# Patient Record
Sex: FEMALE | Race: WHITE | NOT HISPANIC OR LATINO | Employment: UNEMPLOYED | URBAN - NONMETROPOLITAN AREA
[De-identification: names, ages, dates, MRNs, and addresses within clinical notes are randomized per-mention and may not be internally consistent; named-entity substitution may affect disease eponyms.]

---

## 2017-05-21 ENCOUNTER — HOSPITAL ENCOUNTER (INPATIENT)
Facility: HOSPITAL | Age: 57
LOS: 4 days | Discharge: HOME HEALTH | End: 2017-05-27
Attending: STUDENT IN AN ORGANIZED HEALTH CARE EDUCATION/TRAINING PROGRAM | Admitting: FAMILY MEDICINE

## 2017-05-21 DIAGNOSIS — I87.2 VENOUS STASIS DERMATITIS OF BOTH LOWER EXTREMITIES: Chronic | ICD-10-CM

## 2017-05-21 DIAGNOSIS — N19 RENAL FAILURE: Primary | ICD-10-CM

## 2017-05-21 DIAGNOSIS — L03.116 CELLULITIS OF LEFT LOWER EXTREMITY: ICD-10-CM

## 2017-05-21 PROBLEM — E11.9 TYPE 2 DIABETES MELLITUS TREATED WITHOUT INSULIN (HCC): Chronic | Status: ACTIVE | Noted: 2017-05-21

## 2017-05-21 PROBLEM — E78.5 DYSLIPIDEMIA: Chronic | Status: ACTIVE | Noted: 2017-05-21

## 2017-05-21 PROBLEM — N32.81 OVERACTIVE BLADDER: Chronic | Status: ACTIVE | Noted: 2017-05-21

## 2017-05-21 PROBLEM — E03.9 ACQUIRED HYPOTHYROIDISM: Chronic | Status: ACTIVE | Noted: 2017-05-21

## 2017-05-21 PROBLEM — I10 ESSENTIAL HYPERTENSION: Chronic | Status: ACTIVE | Noted: 2017-05-21

## 2017-05-21 PROBLEM — K21.00 GERD WITH ESOPHAGITIS: Chronic | Status: ACTIVE | Noted: 2017-05-21

## 2017-05-21 PROBLEM — N17.9 ACUTE RENAL FAILURE (HCC): Status: ACTIVE | Noted: 2017-05-21

## 2017-05-21 LAB
ANION GAP SERPL CALCULATED.3IONS-SCNC: 18.9 MMOL/L
BASOPHILS # BLD AUTO: 0.05 10*3/MM3 (ref 0–0.2)
BASOPHILS NFR BLD AUTO: 0.3 % (ref 0–2.5)
BUN BLD-MCNC: 25 MG/DL (ref 7–20)
BUN/CREAT SERPL: 12.5 (ref 7.1–23.5)
CALCIUM SPEC-SCNC: 9.5 MG/DL (ref 8.4–10.2)
CHLORIDE SERPL-SCNC: 102 MMOL/L (ref 98–107)
CO2 SERPL-SCNC: 20 MMOL/L (ref 26–30)
CREAT BLD-MCNC: 2 MG/DL (ref 0.6–1.3)
DEPRECATED RDW RBC AUTO: 44.3 FL (ref 37–54)
EOSINOPHIL # BLD AUTO: 0.16 10*3/MM3 (ref 0–0.7)
EOSINOPHIL NFR BLD AUTO: 1 % (ref 0–7)
ERYTHROCYTE [DISTWIDTH] IN BLOOD BY AUTOMATED COUNT: 13.4 % (ref 11.5–14.5)
GFR SERPL CREATININE-BSD FRML MDRD: 26 ML/MIN/1.73
GLUCOSE BLD-MCNC: 162 MG/DL (ref 74–98)
GLUCOSE BLDC GLUCOMTR-MCNC: 213 MG/DL (ref 70–130)
HCT VFR BLD AUTO: 39.1 % (ref 37–47)
HGB BLD-MCNC: 12.7 G/DL (ref 12–16)
IMM GRANULOCYTES # BLD: 0.15 10*3/MM3 (ref 0–0.06)
IMM GRANULOCYTES NFR BLD: 1 % (ref 0–0.6)
LYMPHOCYTES # BLD AUTO: 1.71 10*3/MM3 (ref 0.6–3.4)
LYMPHOCYTES NFR BLD AUTO: 11.2 % (ref 10–50)
MCH RBC QN AUTO: 28.9 PG (ref 27–31)
MCHC RBC AUTO-ENTMCNC: 32.5 G/DL (ref 30–37)
MCV RBC AUTO: 88.9 FL (ref 81–99)
MONOCYTES # BLD AUTO: 1.1 10*3/MM3 (ref 0–0.9)
MONOCYTES NFR BLD AUTO: 7.2 % (ref 0–12)
NEUTROPHILS # BLD AUTO: 12.1 10*3/MM3 (ref 2–6.9)
NEUTROPHILS NFR BLD AUTO: 79.3 % (ref 37–80)
NRBC BLD MANUAL-RTO: 0 /100 WBC (ref 0–0)
PLATELET # BLD AUTO: 322 10*3/MM3 (ref 130–400)
PMV BLD AUTO: 9.4 FL (ref 6–12)
POTASSIUM BLD-SCNC: 3.9 MMOL/L (ref 3.5–5.1)
RBC # BLD AUTO: 4.4 10*6/MM3 (ref 4.2–5.4)
SODIUM BLD-SCNC: 137 MMOL/L (ref 137–145)
WBC NRBC COR # BLD: 15.27 10*3/MM3 (ref 4.8–10.8)

## 2017-05-21 PROCEDURE — G0378 HOSPITAL OBSERVATION PER HR: HCPCS

## 2017-05-21 PROCEDURE — 99219 PR INITIAL OBSERVATION CARE/DAY 50 MINUTES: CPT | Performed by: FAMILY MEDICINE

## 2017-05-21 PROCEDURE — 25010000002 HEPARIN (PORCINE) PER 1000 UNITS: Performed by: FAMILY MEDICINE

## 2017-05-21 PROCEDURE — 82962 GLUCOSE BLOOD TEST: CPT

## 2017-05-21 PROCEDURE — 63710000001 INSULIN ASPART PER 5 UNITS: Performed by: FAMILY MEDICINE

## 2017-05-21 PROCEDURE — 80048 BASIC METABOLIC PNL TOTAL CA: CPT | Performed by: PHYSICIAN ASSISTANT

## 2017-05-21 PROCEDURE — 85025 COMPLETE CBC W/AUTO DIFF WBC: CPT | Performed by: PHYSICIAN ASSISTANT

## 2017-05-21 PROCEDURE — 99283 EMERGENCY DEPT VISIT LOW MDM: CPT

## 2017-05-21 RX ORDER — SODIUM CHLORIDE 0.9 % (FLUSH) 0.9 %
1-10 SYRINGE (ML) INJECTION AS NEEDED
Status: DISCONTINUED | OUTPATIENT
Start: 2017-05-21 | End: 2017-05-27 | Stop reason: HOSPADM

## 2017-05-21 RX ORDER — PRAVASTATIN SODIUM 20 MG
40 TABLET ORAL NIGHTLY
Status: DISCONTINUED | OUTPATIENT
Start: 2017-05-21 | End: 2017-05-27 | Stop reason: HOSPADM

## 2017-05-21 RX ORDER — SODIUM CHLORIDE 9 MG/ML
75 INJECTION, SOLUTION INTRAVENOUS CONTINUOUS
Status: DISCONTINUED | OUTPATIENT
Start: 2017-05-21 | End: 2017-05-22

## 2017-05-21 RX ORDER — CARVEDILOL 6.25 MG/1
6.25 TABLET ORAL 2 TIMES DAILY WITH MEALS
Status: DISCONTINUED | OUTPATIENT
Start: 2017-05-22 | End: 2017-05-25 | Stop reason: DRUGHIGH

## 2017-05-21 RX ORDER — DEXTROSE MONOHYDRATE 25 G/50ML
25 INJECTION, SOLUTION INTRAVENOUS
Status: DISCONTINUED | OUTPATIENT
Start: 2017-05-21 | End: 2017-05-27 | Stop reason: HOSPADM

## 2017-05-21 RX ORDER — ONDANSETRON 2 MG/ML
4 INJECTION INTRAMUSCULAR; INTRAVENOUS EVERY 6 HOURS PRN
Status: DISCONTINUED | OUTPATIENT
Start: 2017-05-21 | End: 2017-05-27 | Stop reason: HOSPADM

## 2017-05-21 RX ORDER — LEVOTHYROXINE SODIUM 0.15 MG/1
150 TABLET ORAL
Status: DISCONTINUED | OUTPATIENT
Start: 2017-05-22 | End: 2017-05-27 | Stop reason: HOSPADM

## 2017-05-21 RX ORDER — FAMOTIDINE 20 MG/1
20 TABLET, FILM COATED ORAL 2 TIMES DAILY
Status: DISCONTINUED | OUTPATIENT
Start: 2017-05-21 | End: 2017-05-26

## 2017-05-21 RX ORDER — ARIPIPRAZOLE 5 MG/1
20 TABLET ORAL DAILY
Status: DISCONTINUED | OUTPATIENT
Start: 2017-05-22 | End: 2017-05-27 | Stop reason: HOSPADM

## 2017-05-21 RX ORDER — NICOTINE POLACRILEX 4 MG
20 LOZENGE BUCCAL
Status: DISCONTINUED | OUTPATIENT
Start: 2017-05-21 | End: 2017-05-27 | Stop reason: HOSPADM

## 2017-05-21 RX ORDER — HEPARIN SODIUM 5000 [USP'U]/ML
5000 INJECTION, SOLUTION INTRAVENOUS; SUBCUTANEOUS EVERY 12 HOURS SCHEDULED
Status: DISCONTINUED | OUTPATIENT
Start: 2017-05-21 | End: 2017-05-27 | Stop reason: HOSPADM

## 2017-05-21 RX ORDER — SPIRONOLACTONE 25 MG/1
25 TABLET ORAL 2 TIMES DAILY
Status: DISCONTINUED | OUTPATIENT
Start: 2017-05-21 | End: 2017-05-26

## 2017-05-21 RX ORDER — ASPIRIN 81 MG/1
81 TABLET ORAL DAILY
Status: DISCONTINUED | OUTPATIENT
Start: 2017-05-21 | End: 2017-05-22

## 2017-05-21 RX ORDER — HYDROXYZINE HYDROCHLORIDE 25 MG/1
25 TABLET, FILM COATED ORAL 3 TIMES DAILY PRN
Status: DISCONTINUED | OUTPATIENT
Start: 2017-05-21 | End: 2017-05-27 | Stop reason: HOSPADM

## 2017-05-21 RX ADMIN — SPIRONOLACTONE 25 MG: 25 TABLET, FILM COATED ORAL at 21:55

## 2017-05-21 RX ADMIN — SODIUM CHLORIDE 600 MG: 9 INJECTION, SOLUTION INTRAVENOUS at 17:34

## 2017-05-21 RX ADMIN — HEPARIN SODIUM 5000 UNITS: 5000 INJECTION, SOLUTION INTRAVENOUS; SUBCUTANEOUS at 22:00

## 2017-05-21 RX ADMIN — PRAVASTATIN SODIUM 40 MG: 20 TABLET ORAL at 21:55

## 2017-05-21 RX ADMIN — INSULIN ASPART 4 UNITS: 100 INJECTION, SOLUTION INTRAVENOUS; SUBCUTANEOUS at 22:00

## 2017-05-21 RX ADMIN — SODIUM CHLORIDE 1000 ML: 9 INJECTION, SOLUTION INTRAVENOUS at 18:16

## 2017-05-21 RX ADMIN — SODIUM CHLORIDE 75 ML/HR: 9 INJECTION, SOLUTION INTRAVENOUS at 22:00

## 2017-05-21 RX ADMIN — FAMOTIDINE 20 MG: 20 TABLET, FILM COATED ORAL at 21:55

## 2017-05-22 ENCOUNTER — APPOINTMENT (OUTPATIENT)
Dept: ULTRASOUND IMAGING | Facility: HOSPITAL | Age: 57
End: 2017-05-22

## 2017-05-22 LAB
ALBUMIN SERPL-MCNC: 3.6 G/DL (ref 3.5–5)
ANION GAP SERPL CALCULATED.3IONS-SCNC: 15.5 MMOL/L
BASOPHILS # BLD AUTO: 0.04 10*3/MM3 (ref 0–0.2)
BASOPHILS NFR BLD AUTO: 0.4 % (ref 0–2.5)
BUN BLD-MCNC: 22 MG/DL (ref 7–20)
BUN/CREAT SERPL: 15.7 (ref 7.1–23.5)
CALCIUM SPEC-SCNC: 8.9 MG/DL (ref 8.4–10.2)
CHLORIDE SERPL-SCNC: 108 MMOL/L (ref 98–107)
CO2 SERPL-SCNC: 21 MMOL/L (ref 26–30)
CREAT BLD-MCNC: 1.4 MG/DL (ref 0.6–1.3)
DEPRECATED RDW RBC AUTO: 44.8 FL (ref 37–54)
EOSINOPHIL # BLD AUTO: 0.4 10*3/MM3 (ref 0–0.7)
EOSINOPHIL NFR BLD AUTO: 3.7 % (ref 0–7)
ERYTHROCYTE [DISTWIDTH] IN BLOOD BY AUTOMATED COUNT: 13.7 % (ref 11.5–14.5)
GFR SERPL CREATININE-BSD FRML MDRD: 39 ML/MIN/1.73
GLUCOSE BLD-MCNC: 122 MG/DL (ref 74–98)
GLUCOSE BLDC GLUCOMTR-MCNC: 127 MG/DL (ref 70–130)
GLUCOSE BLDC GLUCOMTR-MCNC: 188 MG/DL (ref 70–130)
GLUCOSE BLDC GLUCOMTR-MCNC: 194 MG/DL (ref 70–130)
HCT VFR BLD AUTO: 34.3 % (ref 37–47)
HGB BLD-MCNC: 11.1 G/DL (ref 12–16)
IMM GRANULOCYTES # BLD: 0.1 10*3/MM3 (ref 0–0.06)
IMM GRANULOCYTES NFR BLD: 0.9 % (ref 0–0.6)
LYMPHOCYTES # BLD AUTO: 2.16 10*3/MM3 (ref 0.6–3.4)
LYMPHOCYTES NFR BLD AUTO: 19.9 % (ref 10–50)
MCH RBC QN AUTO: 29.1 PG (ref 27–31)
MCHC RBC AUTO-ENTMCNC: 32.4 G/DL (ref 30–37)
MCV RBC AUTO: 89.8 FL (ref 81–99)
MONOCYTES # BLD AUTO: 0.84 10*3/MM3 (ref 0–0.9)
MONOCYTES NFR BLD AUTO: 7.7 % (ref 0–12)
NEUTROPHILS # BLD AUTO: 7.33 10*3/MM3 (ref 2–6.9)
NEUTROPHILS NFR BLD AUTO: 67.4 % (ref 37–80)
NRBC BLD MANUAL-RTO: 0 /100 WBC (ref 0–0)
PHOSPHATE SERPL-MCNC: 4.7 MG/DL (ref 2.5–4.5)
PLATELET # BLD AUTO: 290 10*3/MM3 (ref 130–400)
PMV BLD AUTO: 9.6 FL (ref 6–12)
POTASSIUM BLD-SCNC: 3.5 MMOL/L (ref 3.5–5.1)
RBC # BLD AUTO: 3.82 10*6/MM3 (ref 4.2–5.4)
SODIUM BLD-SCNC: 141 MMOL/L (ref 137–145)
WBC NRBC COR # BLD: 10.87 10*3/MM3 (ref 4.8–10.8)

## 2017-05-22 PROCEDURE — 82962 GLUCOSE BLOOD TEST: CPT

## 2017-05-22 PROCEDURE — 93923 UPR/LXTR ART STDY 3+ LVLS: CPT

## 2017-05-22 PROCEDURE — 85025 COMPLETE CBC W/AUTO DIFF WBC: CPT | Performed by: FAMILY MEDICINE

## 2017-05-22 PROCEDURE — 25010000003 CEFAZOLIN PER 500 MG: Performed by: INTERNAL MEDICINE

## 2017-05-22 PROCEDURE — 25010000002 VANCOMYCIN PER 500 MG: Performed by: INTERNAL MEDICINE

## 2017-05-22 PROCEDURE — 99214 OFFICE O/P EST MOD 30 MIN: CPT | Performed by: INTERNAL MEDICINE

## 2017-05-22 PROCEDURE — 99225 PR SBSQ OBSERVATION CARE/DAY 25 MINUTES: CPT | Performed by: NURSE PRACTITIONER

## 2017-05-22 PROCEDURE — 25010000002 HEPARIN (PORCINE) PER 1000 UNITS: Performed by: FAMILY MEDICINE

## 2017-05-22 PROCEDURE — G0378 HOSPITAL OBSERVATION PER HR: HCPCS

## 2017-05-22 PROCEDURE — 80069 RENAL FUNCTION PANEL: CPT | Performed by: FAMILY MEDICINE

## 2017-05-22 PROCEDURE — 63710000001 INSULIN ASPART PER 5 UNITS: Performed by: FAMILY MEDICINE

## 2017-05-22 RX ORDER — SOLIFENACIN SUCCINATE 10 MG/1
10 TABLET, FILM COATED ORAL DAILY
COMMUNITY
End: 2018-09-04 | Stop reason: ALTCHOICE

## 2017-05-22 RX ORDER — LITHIUM CARBONATE 450 MG
450 TABLET, EXTENDED RELEASE ORAL NIGHTLY
COMMUNITY

## 2017-05-22 RX ORDER — FUROSEMIDE 20 MG/1
40 TABLET ORAL DAILY
COMMUNITY
End: 2017-07-03 | Stop reason: SDUPTHER

## 2017-05-22 RX ORDER — BUPROPION HYDROCHLORIDE 150 MG/1
150 TABLET, EXTENDED RELEASE ORAL DAILY
COMMUNITY

## 2017-05-22 RX ORDER — SIMVASTATIN 40 MG
20 TABLET ORAL NIGHTLY
COMMUNITY

## 2017-05-22 RX ADMIN — SODIUM CHLORIDE 300 MG: 9 INJECTION, SOLUTION INTRAVENOUS at 01:30

## 2017-05-22 RX ADMIN — ARIPIPRAZOLE 20 MG: 5 TABLET ORAL at 08:35

## 2017-05-22 RX ADMIN — FAMOTIDINE 20 MG: 20 TABLET, FILM COATED ORAL at 08:36

## 2017-05-22 RX ADMIN — FAMOTIDINE 20 MG: 20 TABLET, FILM COATED ORAL at 20:44

## 2017-05-22 RX ADMIN — HEPARIN SODIUM 5000 UNITS: 5000 INJECTION, SOLUTION INTRAVENOUS; SUBCUTANEOUS at 20:44

## 2017-05-22 RX ADMIN — SPIRONOLACTONE 25 MG: 25 TABLET, FILM COATED ORAL at 17:59

## 2017-05-22 RX ADMIN — VANCOMYCIN HYDROCHLORIDE 1500 MG: 500 INJECTION, POWDER, LYOPHILIZED, FOR SOLUTION INTRAVENOUS at 17:59

## 2017-05-22 RX ADMIN — SPIRONOLACTONE 25 MG: 25 TABLET, FILM COATED ORAL at 08:36

## 2017-05-22 RX ADMIN — INSULIN ASPART 2 UNITS: 100 INJECTION, SOLUTION INTRAVENOUS; SUBCUTANEOUS at 12:07

## 2017-05-22 RX ADMIN — CARVEDILOL 6.25 MG: 6.25 TABLET, FILM COATED ORAL at 17:59

## 2017-05-22 RX ADMIN — SODIUM CHLORIDE 300 MG: 9 INJECTION, SOLUTION INTRAVENOUS at 08:36

## 2017-05-22 RX ADMIN — CARVEDILOL 6.25 MG: 6.25 TABLET, FILM COATED ORAL at 08:36

## 2017-05-22 RX ADMIN — SODIUM CHLORIDE 75 ML/HR: 9 INJECTION, SOLUTION INTRAVENOUS at 15:56

## 2017-05-22 RX ADMIN — LEVOTHYROXINE SODIUM 150 MCG: 150 TABLET ORAL at 06:00

## 2017-05-22 RX ADMIN — INSULIN ASPART 2 UNITS: 100 INJECTION, SOLUTION INTRAVENOUS; SUBCUTANEOUS at 16:31

## 2017-05-22 RX ADMIN — INSULIN ASPART 2 UNITS: 100 INJECTION, SOLUTION INTRAVENOUS; SUBCUTANEOUS at 20:43

## 2017-05-22 RX ADMIN — PRAVASTATIN SODIUM 40 MG: 20 TABLET ORAL at 20:44

## 2017-05-22 RX ADMIN — HEPARIN SODIUM 5000 UNITS: 5000 INJECTION, SOLUTION INTRAVENOUS; SUBCUTANEOUS at 08:36

## 2017-05-22 RX ADMIN — CEFAZOLIN: 1 INJECTION, POWDER, FOR SOLUTION INTRAVENOUS at 16:31

## 2017-05-23 LAB
ALBUMIN SERPL-MCNC: 3.6 G/DL (ref 3.5–5)
ANION GAP SERPL CALCULATED.3IONS-SCNC: 15.6 MMOL/L
BASOPHILS # BLD AUTO: 0.08 10*3/MM3 (ref 0–0.2)
BASOPHILS NFR BLD AUTO: 0.8 % (ref 0–2.5)
BUN BLD-MCNC: 17 MG/DL (ref 7–20)
BUN/CREAT SERPL: 15.5 (ref 7.1–23.5)
CALCIUM SPEC-SCNC: 8.9 MG/DL (ref 8.4–10.2)
CHLORIDE SERPL-SCNC: 111 MMOL/L (ref 98–107)
CO2 SERPL-SCNC: 18 MMOL/L (ref 26–30)
CREAT BLD-MCNC: 1.1 MG/DL (ref 0.6–1.3)
DEPRECATED RDW RBC AUTO: 47.7 FL (ref 37–54)
EOSINOPHIL # BLD AUTO: 0.34 10*3/MM3 (ref 0–0.7)
EOSINOPHIL NFR BLD AUTO: 3.4 % (ref 0–7)
ERYTHROCYTE [DISTWIDTH] IN BLOOD BY AUTOMATED COUNT: 13.8 % (ref 11.5–14.5)
GFR SERPL CREATININE-BSD FRML MDRD: 51 ML/MIN/1.73
GLUCOSE BLD-MCNC: 141 MG/DL (ref 74–98)
GLUCOSE BLDC GLUCOMTR-MCNC: 134 MG/DL (ref 70–130)
GLUCOSE BLDC GLUCOMTR-MCNC: 197 MG/DL (ref 70–130)
GLUCOSE BLDC GLUCOMTR-MCNC: 213 MG/DL (ref 70–130)
GRAM STN SPEC: NORMAL
HCT VFR BLD AUTO: 36.3 % (ref 37–47)
HGB BLD-MCNC: 11.3 G/DL (ref 12–16)
IMM GRANULOCYTES # BLD: 0.13 10*3/MM3 (ref 0–0.06)
IMM GRANULOCYTES NFR BLD: 1.3 % (ref 0–0.6)
LYMPHOCYTES # BLD AUTO: 2.56 10*3/MM3 (ref 0.6–3.4)
LYMPHOCYTES NFR BLD AUTO: 25.4 % (ref 10–50)
MCH RBC QN AUTO: 28.8 PG (ref 27–31)
MCHC RBC AUTO-ENTMCNC: 31.1 G/DL (ref 30–37)
MCV RBC AUTO: 92.4 FL (ref 81–99)
MONOCYTES # BLD AUTO: 0.76 10*3/MM3 (ref 0–0.9)
MONOCYTES NFR BLD AUTO: 7.5 % (ref 0–12)
NEUTROPHILS # BLD AUTO: 6.22 10*3/MM3 (ref 2–6.9)
NEUTROPHILS NFR BLD AUTO: 61.6 % (ref 37–80)
NRBC BLD MANUAL-RTO: 0 /100 WBC (ref 0–0)
PHOSPHATE SERPL-MCNC: 4.8 MG/DL (ref 2.5–4.5)
PLATELET # BLD AUTO: 291 10*3/MM3 (ref 130–400)
PMV BLD AUTO: 9.2 FL (ref 6–12)
POTASSIUM BLD-SCNC: 3.6 MMOL/L (ref 3.5–5.1)
RBC # BLD AUTO: 3.93 10*6/MM3 (ref 4.2–5.4)
SODIUM BLD-SCNC: 141 MMOL/L (ref 137–145)
WBC NRBC COR # BLD: 10.09 10*3/MM3 (ref 4.8–10.8)

## 2017-05-23 PROCEDURE — 99232 SBSQ HOSP IP/OBS MODERATE 35: CPT | Performed by: NURSE PRACTITIONER

## 2017-05-23 PROCEDURE — 80069 RENAL FUNCTION PANEL: CPT | Performed by: FAMILY MEDICINE

## 2017-05-23 PROCEDURE — 87186 SC STD MICRODIL/AGAR DIL: CPT | Performed by: NURSE PRACTITIONER

## 2017-05-23 PROCEDURE — 87070 CULTURE OTHR SPECIMN AEROBIC: CPT | Performed by: NURSE PRACTITIONER

## 2017-05-23 PROCEDURE — 82962 GLUCOSE BLOOD TEST: CPT

## 2017-05-23 PROCEDURE — 87077 CULTURE AEROBIC IDENTIFY: CPT | Performed by: NURSE PRACTITIONER

## 2017-05-23 PROCEDURE — 87075 CULTR BACTERIA EXCEPT BLOOD: CPT | Performed by: NURSE PRACTITIONER

## 2017-05-23 PROCEDURE — 63710000001 INSULIN ASPART PER 5 UNITS: Performed by: FAMILY MEDICINE

## 2017-05-23 PROCEDURE — 85025 COMPLETE CBC W/AUTO DIFF WBC: CPT | Performed by: FAMILY MEDICINE

## 2017-05-23 PROCEDURE — 25010000002 VANCOMYCIN PER 500 MG: Performed by: INTERNAL MEDICINE

## 2017-05-23 PROCEDURE — 87205 SMEAR GRAM STAIN: CPT | Performed by: NURSE PRACTITIONER

## 2017-05-23 PROCEDURE — 87147 CULTURE TYPE IMMUNOLOGIC: CPT | Performed by: NURSE PRACTITIONER

## 2017-05-23 PROCEDURE — 25010000003 CEFAZOLIN PER 500 MG: Performed by: INTERNAL MEDICINE

## 2017-05-23 PROCEDURE — 25010000002 HEPARIN (PORCINE) PER 1000 UNITS: Performed by: FAMILY MEDICINE

## 2017-05-23 RX ORDER — DILTIAZEM HYDROCHLORIDE 240 MG/1
240 CAPSULE, COATED, EXTENDED RELEASE ORAL 2 TIMES DAILY
COMMUNITY

## 2017-05-23 RX ORDER — LITHIUM CARBONATE 450 MG
450 TABLET, EXTENDED RELEASE ORAL NIGHTLY
Status: DISCONTINUED | OUTPATIENT
Start: 2017-05-23 | End: 2017-05-27 | Stop reason: HOSPADM

## 2017-05-23 RX ORDER — OXYBUTYNIN CHLORIDE 5 MG/1
10 TABLET, EXTENDED RELEASE ORAL DAILY
Status: DISCONTINUED | OUTPATIENT
Start: 2017-05-23 | End: 2017-05-27 | Stop reason: HOSPADM

## 2017-05-23 RX ORDER — FUROSEMIDE 20 MG/1
20 TABLET ORAL 2 TIMES DAILY
Status: DISCONTINUED | OUTPATIENT
Start: 2017-05-23 | End: 2017-05-26

## 2017-05-23 RX ORDER — ATORVASTATIN CALCIUM 20 MG/1
20 TABLET, FILM COATED ORAL DAILY
Status: DISCONTINUED | OUTPATIENT
Start: 2017-05-23 | End: 2017-05-23

## 2017-05-23 RX ORDER — BUPROPION HYDROCHLORIDE 150 MG/1
150 TABLET, EXTENDED RELEASE ORAL DAILY
Status: DISCONTINUED | OUTPATIENT
Start: 2017-05-23 | End: 2017-05-27 | Stop reason: HOSPADM

## 2017-05-23 RX ADMIN — FUROSEMIDE 20 MG: 20 TABLET ORAL at 14:31

## 2017-05-23 RX ADMIN — BUPROPION HYDROCHLORIDE 150 MG: 150 TABLET, FILM COATED, EXTENDED RELEASE ORAL at 14:57

## 2017-05-23 RX ADMIN — LEVOTHYROXINE SODIUM 150 MCG: 150 TABLET ORAL at 06:05

## 2017-05-23 RX ADMIN — CEFAZOLIN: 1 INJECTION, POWDER, FOR SOLUTION INTRAVENOUS at 01:46

## 2017-05-23 RX ADMIN — SPIRONOLACTONE 25 MG: 25 TABLET, FILM COATED ORAL at 17:00

## 2017-05-23 RX ADMIN — LITHIUM CARBONATE 450 MG: 450 TABLET, EXTENDED RELEASE ORAL at 21:48

## 2017-05-23 RX ADMIN — VANCOMYCIN HYDROCHLORIDE 2000 MG: 500 INJECTION, POWDER, LYOPHILIZED, FOR SOLUTION INTRAVENOUS at 21:30

## 2017-05-23 RX ADMIN — INSULIN ASPART 2 UNITS: 100 INJECTION, SOLUTION INTRAVENOUS; SUBCUTANEOUS at 21:46

## 2017-05-23 RX ADMIN — FAMOTIDINE 20 MG: 20 TABLET, FILM COATED ORAL at 21:48

## 2017-05-23 RX ADMIN — CARVEDILOL 6.25 MG: 6.25 TABLET, FILM COATED ORAL at 17:00

## 2017-05-23 RX ADMIN — VANCOMYCIN HYDROCHLORIDE 2000 MG: 500 INJECTION, POWDER, LYOPHILIZED, FOR SOLUTION INTRAVENOUS at 09:54

## 2017-05-23 RX ADMIN — SPIRONOLACTONE 25 MG: 25 TABLET, FILM COATED ORAL at 08:01

## 2017-05-23 RX ADMIN — CEFAZOLIN: 1 INJECTION, POWDER, FOR SOLUTION INTRAVENOUS at 16:21

## 2017-05-23 RX ADMIN — PRAVASTATIN SODIUM 40 MG: 20 TABLET ORAL at 21:48

## 2017-05-23 RX ADMIN — INSULIN ASPART 4 UNITS: 100 INJECTION, SOLUTION INTRAVENOUS; SUBCUTANEOUS at 11:53

## 2017-05-23 RX ADMIN — FUROSEMIDE 20 MG: 20 TABLET ORAL at 08:01

## 2017-05-23 RX ADMIN — HEPARIN SODIUM 5000 UNITS: 5000 INJECTION, SOLUTION INTRAVENOUS; SUBCUTANEOUS at 21:48

## 2017-05-23 RX ADMIN — OXYBUTYNIN CHLORIDE 10 MG: 5 TABLET, EXTENDED RELEASE ORAL at 16:59

## 2017-05-23 RX ADMIN — FAMOTIDINE 20 MG: 20 TABLET, FILM COATED ORAL at 08:01

## 2017-05-23 RX ADMIN — CARVEDILOL 6.25 MG: 6.25 TABLET, FILM COATED ORAL at 08:01

## 2017-05-23 RX ADMIN — ARIPIPRAZOLE 20 MG: 5 TABLET ORAL at 08:01

## 2017-05-23 RX ADMIN — HEPARIN SODIUM 5000 UNITS: 5000 INJECTION, SOLUTION INTRAVENOUS; SUBCUTANEOUS at 08:01

## 2017-05-23 RX ADMIN — CEFAZOLIN: 1 INJECTION, POWDER, FOR SOLUTION INTRAVENOUS at 08:01

## 2017-05-24 LAB
ALBUMIN SERPL-MCNC: 3.4 G/DL (ref 3.5–5)
ANION GAP SERPL CALCULATED.3IONS-SCNC: 14.3 MMOL/L
BASOPHILS # BLD AUTO: 0.06 10*3/MM3 (ref 0–0.2)
BASOPHILS NFR BLD AUTO: 0.7 % (ref 0–2.5)
BUN BLD-MCNC: 13 MG/DL (ref 7–20)
BUN/CREAT SERPL: 11.8 (ref 7.1–23.5)
CALCIUM SPEC-SCNC: 8.9 MG/DL (ref 8.4–10.2)
CHLORIDE SERPL-SCNC: 111 MMOL/L (ref 98–107)
CO2 SERPL-SCNC: 22 MMOL/L (ref 26–30)
CREAT BLD-MCNC: 1.1 MG/DL (ref 0.6–1.3)
DEPRECATED RDW RBC AUTO: 45.2 FL (ref 37–54)
EOSINOPHIL # BLD AUTO: 0.4 10*3/MM3 (ref 0–0.7)
EOSINOPHIL NFR BLD AUTO: 4.4 % (ref 0–7)
ERYTHROCYTE [DISTWIDTH] IN BLOOD BY AUTOMATED COUNT: 13.8 % (ref 11.5–14.5)
GFR SERPL CREATININE-BSD FRML MDRD: 51 ML/MIN/1.73
GLUCOSE BLD-MCNC: 123 MG/DL (ref 74–98)
GLUCOSE BLDC GLUCOMTR-MCNC: 116 MG/DL (ref 70–130)
GLUCOSE BLDC GLUCOMTR-MCNC: 129 MG/DL (ref 70–130)
GLUCOSE BLDC GLUCOMTR-MCNC: 147 MG/DL (ref 70–130)
HCT VFR BLD AUTO: 33.2 % (ref 37–47)
HGB BLD-MCNC: 10.4 G/DL (ref 12–16)
IMM GRANULOCYTES # BLD: 0.11 10*3/MM3 (ref 0–0.06)
IMM GRANULOCYTES NFR BLD: 1.2 % (ref 0–0.6)
LYMPHOCYTES # BLD AUTO: 2.39 10*3/MM3 (ref 0.6–3.4)
LYMPHOCYTES NFR BLD AUTO: 26.4 % (ref 10–50)
MCH RBC QN AUTO: 28.2 PG (ref 27–31)
MCHC RBC AUTO-ENTMCNC: 31.3 G/DL (ref 30–37)
MCV RBC AUTO: 90 FL (ref 81–99)
MONOCYTES # BLD AUTO: 0.63 10*3/MM3 (ref 0–0.9)
MONOCYTES NFR BLD AUTO: 7 % (ref 0–12)
NEUTROPHILS # BLD AUTO: 5.47 10*3/MM3 (ref 2–6.9)
NEUTROPHILS NFR BLD AUTO: 60.3 % (ref 37–80)
NRBC BLD MANUAL-RTO: 0 /100 WBC (ref 0–0)
PHOSPHATE SERPL-MCNC: 4.7 MG/DL (ref 2.5–4.5)
PLATELET # BLD AUTO: 347 10*3/MM3 (ref 130–400)
PMV BLD AUTO: 9.3 FL (ref 6–12)
POTASSIUM BLD-SCNC: 3.3 MMOL/L (ref 3.5–5.1)
RBC # BLD AUTO: 3.69 10*6/MM3 (ref 4.2–5.4)
SODIUM BLD-SCNC: 144 MMOL/L (ref 137–145)
WBC NRBC COR # BLD: 9.06 10*3/MM3 (ref 4.8–10.8)

## 2017-05-24 PROCEDURE — 80069 RENAL FUNCTION PANEL: CPT | Performed by: FAMILY MEDICINE

## 2017-05-24 PROCEDURE — 82962 GLUCOSE BLOOD TEST: CPT

## 2017-05-24 PROCEDURE — 25010000002 VANCOMYCIN PER 500 MG: Performed by: INTERNAL MEDICINE

## 2017-05-24 PROCEDURE — 25010000003 CEFAZOLIN PER 500 MG: Performed by: INTERNAL MEDICINE

## 2017-05-24 PROCEDURE — 63710000001 INSULIN ASPART PER 5 UNITS: Performed by: FAMILY MEDICINE

## 2017-05-24 PROCEDURE — 99232 SBSQ HOSP IP/OBS MODERATE 35: CPT | Performed by: NURSE PRACTITIONER

## 2017-05-24 PROCEDURE — 94799 UNLISTED PULMONARY SVC/PX: CPT

## 2017-05-24 PROCEDURE — 85025 COMPLETE CBC W/AUTO DIFF WBC: CPT | Performed by: FAMILY MEDICINE

## 2017-05-24 PROCEDURE — 25010000002 HEPARIN (PORCINE) PER 1000 UNITS: Performed by: FAMILY MEDICINE

## 2017-05-24 RX ORDER — POTASSIUM CHLORIDE 750 MG/1
20 CAPSULE, EXTENDED RELEASE ORAL ONCE
Status: COMPLETED | OUTPATIENT
Start: 2017-05-24 | End: 2017-05-24

## 2017-05-24 RX ORDER — POTASSIUM CHLORIDE 750 MG/1
40 CAPSULE, EXTENDED RELEASE ORAL ONCE
Status: COMPLETED | OUTPATIENT
Start: 2017-05-24 | End: 2017-05-24

## 2017-05-24 RX ORDER — ACETAMINOPHEN 325 MG/1
650 TABLET ORAL EVERY 6 HOURS PRN
Status: DISCONTINUED | OUTPATIENT
Start: 2017-05-24 | End: 2017-05-27 | Stop reason: HOSPADM

## 2017-05-24 RX ADMIN — CARVEDILOL 6.25 MG: 6.25 TABLET, FILM COATED ORAL at 08:36

## 2017-05-24 RX ADMIN — VANCOMYCIN HYDROCHLORIDE 2000 MG: 500 INJECTION, POWDER, LYOPHILIZED, FOR SOLUTION INTRAVENOUS at 08:36

## 2017-05-24 RX ADMIN — FAMOTIDINE 20 MG: 20 TABLET, FILM COATED ORAL at 08:36

## 2017-05-24 RX ADMIN — FUROSEMIDE 20 MG: 20 TABLET ORAL at 15:34

## 2017-05-24 RX ADMIN — HEPARIN SODIUM 5000 UNITS: 5000 INJECTION, SOLUTION INTRAVENOUS; SUBCUTANEOUS at 08:36

## 2017-05-24 RX ADMIN — CEFAZOLIN: 1 INJECTION, POWDER, FOR SOLUTION INTRAVENOUS at 15:35

## 2017-05-24 RX ADMIN — FAMOTIDINE 20 MG: 20 TABLET, FILM COATED ORAL at 20:25

## 2017-05-24 RX ADMIN — FUROSEMIDE 20 MG: 20 TABLET ORAL at 08:36

## 2017-05-24 RX ADMIN — CEFAZOLIN: 1 INJECTION, POWDER, FOR SOLUTION INTRAVENOUS at 08:36

## 2017-05-24 RX ADMIN — VANCOMYCIN HYDROCHLORIDE 2000 MG: 500 INJECTION, POWDER, LYOPHILIZED, FOR SOLUTION INTRAVENOUS at 20:30

## 2017-05-24 RX ADMIN — HEPARIN SODIUM 5000 UNITS: 5000 INJECTION, SOLUTION INTRAVENOUS; SUBCUTANEOUS at 20:25

## 2017-05-24 RX ADMIN — PRAVASTATIN SODIUM 40 MG: 20 TABLET ORAL at 20:25

## 2017-05-24 RX ADMIN — OXYBUTYNIN CHLORIDE 10 MG: 5 TABLET, EXTENDED RELEASE ORAL at 08:36

## 2017-05-24 RX ADMIN — LEVOTHYROXINE SODIUM 150 MCG: 150 TABLET ORAL at 06:36

## 2017-05-24 RX ADMIN — CEFAZOLIN: 1 INJECTION, POWDER, FOR SOLUTION INTRAVENOUS at 01:30

## 2017-05-24 RX ADMIN — LITHIUM CARBONATE 450 MG: 450 TABLET, EXTENDED RELEASE ORAL at 20:25

## 2017-05-24 RX ADMIN — INSULIN ASPART 2 UNITS: 100 INJECTION, SOLUTION INTRAVENOUS; SUBCUTANEOUS at 12:26

## 2017-05-24 RX ADMIN — BUPROPION HYDROCHLORIDE 150 MG: 150 TABLET, FILM COATED, EXTENDED RELEASE ORAL at 08:36

## 2017-05-24 RX ADMIN — ARIPIPRAZOLE 20 MG: 5 TABLET ORAL at 08:36

## 2017-05-24 RX ADMIN — CARVEDILOL 6.25 MG: 6.25 TABLET, FILM COATED ORAL at 15:35

## 2017-05-24 RX ADMIN — POTASSIUM CHLORIDE 40 MEQ: 750 CAPSULE, EXTENDED RELEASE ORAL at 15:34

## 2017-05-24 RX ADMIN — SPIRONOLACTONE 25 MG: 25 TABLET, FILM COATED ORAL at 08:36

## 2017-05-24 RX ADMIN — ACETAMINOPHEN 650 MG: 325 TABLET, FILM COATED ORAL at 20:25

## 2017-05-24 RX ADMIN — POTASSIUM CHLORIDE 20 MEQ: 750 CAPSULE, EXTENDED RELEASE ORAL at 12:26

## 2017-05-24 RX ADMIN — SPIRONOLACTONE 25 MG: 25 TABLET, FILM COATED ORAL at 15:34

## 2017-05-25 LAB
ALBUMIN SERPL-MCNC: 3.4 G/DL (ref 3.5–5)
ANION GAP SERPL CALCULATED.3IONS-SCNC: 13.6 MMOL/L
BASOPHILS # BLD AUTO: 0.04 10*3/MM3 (ref 0–0.2)
BASOPHILS NFR BLD AUTO: 0.5 % (ref 0–2.5)
BUN BLD-MCNC: 11 MG/DL (ref 7–20)
BUN/CREAT SERPL: 11 (ref 7.1–23.5)
CALCIUM SPEC-SCNC: 9.2 MG/DL (ref 8.4–10.2)
CHLORIDE SERPL-SCNC: 113 MMOL/L (ref 98–107)
CO2 SERPL-SCNC: 23 MMOL/L (ref 26–30)
CREAT BLD-MCNC: 1 MG/DL (ref 0.6–1.3)
DEPRECATED RDW RBC AUTO: 44.8 FL (ref 37–54)
EOSINOPHIL # BLD AUTO: 0.32 10*3/MM3 (ref 0–0.7)
EOSINOPHIL NFR BLD AUTO: 4.1 % (ref 0–7)
ERYTHROCYTE [DISTWIDTH] IN BLOOD BY AUTOMATED COUNT: 13.7 % (ref 11.5–14.5)
GFR SERPL CREATININE-BSD FRML MDRD: 57 ML/MIN/1.73
GLUCOSE BLD-MCNC: 112 MG/DL (ref 74–98)
GLUCOSE BLDC GLUCOMTR-MCNC: 111 MG/DL (ref 70–130)
GLUCOSE BLDC GLUCOMTR-MCNC: 141 MG/DL (ref 70–130)
GLUCOSE BLDC GLUCOMTR-MCNC: 144 MG/DL (ref 70–130)
GLUCOSE BLDC GLUCOMTR-MCNC: 160 MG/DL (ref 70–130)
GLUCOSE BLDC GLUCOMTR-MCNC: 164 MG/DL (ref 70–130)
GLUCOSE BLDC GLUCOMTR-MCNC: 170 MG/DL (ref 70–130)
HCT VFR BLD AUTO: 33.1 % (ref 37–47)
HGB BLD-MCNC: 10.6 G/DL (ref 12–16)
IMM GRANULOCYTES # BLD: 0.11 10*3/MM3 (ref 0–0.06)
IMM GRANULOCYTES NFR BLD: 1.4 % (ref 0–0.6)
LYMPHOCYTES # BLD AUTO: 2.31 10*3/MM3 (ref 0.6–3.4)
LYMPHOCYTES NFR BLD AUTO: 29.4 % (ref 10–50)
MCH RBC QN AUTO: 28.6 PG (ref 27–31)
MCHC RBC AUTO-ENTMCNC: 32 G/DL (ref 30–37)
MCV RBC AUTO: 89.5 FL (ref 81–99)
MONOCYTES # BLD AUTO: 0.55 10*3/MM3 (ref 0–0.9)
MONOCYTES NFR BLD AUTO: 7 % (ref 0–12)
NEUTROPHILS # BLD AUTO: 4.53 10*3/MM3 (ref 2–6.9)
NEUTROPHILS NFR BLD AUTO: 57.6 % (ref 37–80)
NRBC BLD MANUAL-RTO: 0 /100 WBC (ref 0–0)
PHOSPHATE SERPL-MCNC: 3.9 MG/DL (ref 2.5–4.5)
PLATELET # BLD AUTO: 366 10*3/MM3 (ref 130–400)
PMV BLD AUTO: 9.3 FL (ref 6–12)
POTASSIUM BLD-SCNC: 3.6 MMOL/L (ref 3.5–5.1)
RBC # BLD AUTO: 3.7 10*6/MM3 (ref 4.2–5.4)
SODIUM BLD-SCNC: 146 MMOL/L (ref 137–145)
VANCOMYCIN TROUGH SERPL-MCNC: 22.36 MCG/ML (ref 5–15)
VANCOMYCIN TROUGH SERPL-MCNC: 25.79 MCG/ML (ref 5–15)
WBC NRBC COR # BLD: 7.86 10*3/MM3 (ref 4.8–10.8)

## 2017-05-25 PROCEDURE — 82962 GLUCOSE BLOOD TEST: CPT

## 2017-05-25 PROCEDURE — 99232 SBSQ HOSP IP/OBS MODERATE 35: CPT | Performed by: NURSE PRACTITIONER

## 2017-05-25 PROCEDURE — 80069 RENAL FUNCTION PANEL: CPT | Performed by: FAMILY MEDICINE

## 2017-05-25 PROCEDURE — 80202 ASSAY OF VANCOMYCIN: CPT | Performed by: INTERNAL MEDICINE

## 2017-05-25 PROCEDURE — 25010000002 HEPARIN (PORCINE) PER 1000 UNITS: Performed by: FAMILY MEDICINE

## 2017-05-25 PROCEDURE — 85025 COMPLETE CBC W/AUTO DIFF WBC: CPT | Performed by: FAMILY MEDICINE

## 2017-05-25 PROCEDURE — 63710000001 INSULIN ASPART PER 5 UNITS: Performed by: FAMILY MEDICINE

## 2017-05-25 PROCEDURE — 25010000002 VANCOMYCIN PER 500 MG: Performed by: INTERNAL MEDICINE

## 2017-05-25 PROCEDURE — 25010000003 CEFAZOLIN PER 500 MG: Performed by: INTERNAL MEDICINE

## 2017-05-25 RX ORDER — CARVEDILOL 6.25 MG/1
6.25 TABLET ORAL EVERY 12 HOURS SCHEDULED
Status: DISCONTINUED | OUTPATIENT
Start: 2017-05-25 | End: 2017-05-27 | Stop reason: HOSPADM

## 2017-05-25 RX ADMIN — LITHIUM CARBONATE 450 MG: 450 TABLET, EXTENDED RELEASE ORAL at 20:48

## 2017-05-25 RX ADMIN — CEFAZOLIN: 1 INJECTION, POWDER, FOR SOLUTION INTRAVENOUS at 08:24

## 2017-05-25 RX ADMIN — CARVEDILOL 6.25 MG: 6.25 TABLET, FILM COATED ORAL at 20:48

## 2017-05-25 RX ADMIN — INSULIN ASPART 2 UNITS: 100 INJECTION, SOLUTION INTRAVENOUS; SUBCUTANEOUS at 17:11

## 2017-05-25 RX ADMIN — CARVEDILOL 6.25 MG: 6.25 TABLET, FILM COATED ORAL at 08:26

## 2017-05-25 RX ADMIN — FUROSEMIDE 20 MG: 20 TABLET ORAL at 08:24

## 2017-05-25 RX ADMIN — CEFAZOLIN: 1 INJECTION, POWDER, FOR SOLUTION INTRAVENOUS at 01:00

## 2017-05-25 RX ADMIN — ACETAMINOPHEN 650 MG: 325 TABLET, FILM COATED ORAL at 15:54

## 2017-05-25 RX ADMIN — BUPROPION HYDROCHLORIDE 150 MG: 150 TABLET, FILM COATED, EXTENDED RELEASE ORAL at 08:24

## 2017-05-25 RX ADMIN — CEFAZOLIN: 1 INJECTION, POWDER, FOR SOLUTION INTRAVENOUS at 17:11

## 2017-05-25 RX ADMIN — VANCOMYCIN HYDROCHLORIDE 1500 MG: 500 INJECTION, POWDER, LYOPHILIZED, FOR SOLUTION INTRAVENOUS at 14:44

## 2017-05-25 RX ADMIN — FUROSEMIDE 20 MG: 20 TABLET ORAL at 14:44

## 2017-05-25 RX ADMIN — PRAVASTATIN SODIUM 40 MG: 20 TABLET ORAL at 20:48

## 2017-05-25 RX ADMIN — HEPARIN SODIUM 5000 UNITS: 5000 INJECTION, SOLUTION INTRAVENOUS; SUBCUTANEOUS at 08:24

## 2017-05-25 RX ADMIN — FAMOTIDINE 20 MG: 20 TABLET, FILM COATED ORAL at 20:48

## 2017-05-25 RX ADMIN — ARIPIPRAZOLE 20 MG: 5 TABLET ORAL at 08:24

## 2017-05-25 RX ADMIN — OXYBUTYNIN CHLORIDE 10 MG: 5 TABLET, EXTENDED RELEASE ORAL at 08:24

## 2017-05-25 RX ADMIN — SPIRONOLACTONE 25 MG: 25 TABLET, FILM COATED ORAL at 08:24

## 2017-05-25 RX ADMIN — ACETAMINOPHEN 650 MG: 325 TABLET, FILM COATED ORAL at 20:48

## 2017-05-25 RX ADMIN — FAMOTIDINE 20 MG: 20 TABLET, FILM COATED ORAL at 08:24

## 2017-05-25 RX ADMIN — HEPARIN SODIUM 5000 UNITS: 5000 INJECTION, SOLUTION INTRAVENOUS; SUBCUTANEOUS at 20:48

## 2017-05-25 RX ADMIN — LEVOTHYROXINE SODIUM 150 MCG: 150 TABLET ORAL at 06:38

## 2017-05-26 ENCOUNTER — APPOINTMENT (OUTPATIENT)
Dept: GENERAL RADIOLOGY | Facility: HOSPITAL | Age: 57
End: 2017-05-26

## 2017-05-26 LAB
ALBUMIN SERPL-MCNC: 3.3 G/DL (ref 3.5–5)
ANION GAP SERPL CALCULATED.3IONS-SCNC: 18.7 MMOL/L
BACTERIA SPEC AEROBE CULT: ABNORMAL
BASOPHILS # BLD AUTO: 0.05 10*3/MM3 (ref 0–0.2)
BASOPHILS NFR BLD AUTO: 0.6 % (ref 0–2.5)
BUN BLD-MCNC: 10 MG/DL (ref 7–20)
BUN/CREAT SERPL: 11.1 (ref 7.1–23.5)
CALCIUM SPEC-SCNC: 9.1 MG/DL (ref 8.4–10.2)
CHLORIDE SERPL-SCNC: 112 MMOL/L (ref 98–107)
CO2 SERPL-SCNC: 19 MMOL/L (ref 26–30)
CREAT BLD-MCNC: 0.9 MG/DL (ref 0.6–1.3)
DEPRECATED RDW RBC AUTO: 43.8 FL (ref 37–54)
EOSINOPHIL # BLD AUTO: 0.32 10*3/MM3 (ref 0–0.7)
EOSINOPHIL NFR BLD AUTO: 3.7 % (ref 0–7)
ERYTHROCYTE [DISTWIDTH] IN BLOOD BY AUTOMATED COUNT: 13.7 % (ref 11.5–14.5)
GFR SERPL CREATININE-BSD FRML MDRD: 65 ML/MIN/1.73
GLUCOSE BLD-MCNC: 106 MG/DL (ref 74–98)
GLUCOSE BLDC GLUCOMTR-MCNC: 110 MG/DL (ref 70–130)
GLUCOSE BLDC GLUCOMTR-MCNC: 134 MG/DL (ref 70–130)
GLUCOSE BLDC GLUCOMTR-MCNC: 142 MG/DL (ref 70–130)
GLUCOSE BLDC GLUCOMTR-MCNC: 190 MG/DL (ref 70–130)
HCT VFR BLD AUTO: 36 % (ref 37–47)
HGB BLD-MCNC: 11.6 G/DL (ref 12–16)
IMM GRANULOCYTES # BLD: 0.12 10*3/MM3 (ref 0–0.06)
IMM GRANULOCYTES NFR BLD: 1.4 % (ref 0–0.6)
LYMPHOCYTES # BLD AUTO: 2.39 10*3/MM3 (ref 0.6–3.4)
LYMPHOCYTES NFR BLD AUTO: 27.9 % (ref 10–50)
MCH RBC QN AUTO: 28.4 PG (ref 27–31)
MCHC RBC AUTO-ENTMCNC: 32.2 G/DL (ref 30–37)
MCV RBC AUTO: 88.2 FL (ref 81–99)
MONOCYTES # BLD AUTO: 0.69 10*3/MM3 (ref 0–0.9)
MONOCYTES NFR BLD AUTO: 8 % (ref 0–12)
NEUTROPHILS # BLD AUTO: 5.01 10*3/MM3 (ref 2–6.9)
NEUTROPHILS NFR BLD AUTO: 58.4 % (ref 37–80)
NRBC BLD MANUAL-RTO: 0 /100 WBC (ref 0–0)
PHOSPHATE SERPL-MCNC: 4.3 MG/DL (ref 2.5–4.5)
PLATELET # BLD AUTO: 367 10*3/MM3 (ref 130–400)
PMV BLD AUTO: 9.3 FL (ref 6–12)
POTASSIUM BLD-SCNC: 3.7 MMOL/L (ref 3.5–5.1)
RBC # BLD AUTO: 4.08 10*6/MM3 (ref 4.2–5.4)
SODIUM BLD-SCNC: 146 MMOL/L (ref 137–145)
WBC NRBC COR # BLD: 8.58 10*3/MM3 (ref 4.8–10.8)

## 2017-05-26 PROCEDURE — 75625 CONTRAST EXAM ABDOMINL AORTA: CPT | Performed by: INTERNAL MEDICINE

## 2017-05-26 PROCEDURE — 75710 ARTERY X-RAYS ARM/LEG: CPT | Performed by: INTERNAL MEDICINE

## 2017-05-26 PROCEDURE — 0 IOPAMIDOL PER 1 ML: Performed by: INTERNAL MEDICINE

## 2017-05-26 PROCEDURE — C1894 INTRO/SHEATH, NON-LASER: HCPCS

## 2017-05-26 PROCEDURE — B4101ZZ FLUOROSCOPY OF ABDOMINAL AORTA USING LOW OSMOLAR CONTRAST: ICD-10-PCS | Performed by: INTERNAL MEDICINE

## 2017-05-26 PROCEDURE — 99232 SBSQ HOSP IP/OBS MODERATE 35: CPT | Performed by: NURSE PRACTITIONER

## 2017-05-26 PROCEDURE — 25010000002 VANCOMYCIN PER 500 MG: Performed by: INTERNAL MEDICINE

## 2017-05-26 PROCEDURE — 25010000002 HEPARIN (PORCINE) PER 1000 UNITS: Performed by: FAMILY MEDICINE

## 2017-05-26 PROCEDURE — 82962 GLUCOSE BLOOD TEST: CPT

## 2017-05-26 PROCEDURE — 71010 HC CHEST PA OR AP: CPT

## 2017-05-26 PROCEDURE — C1760 CLOSURE DEV, VASC: HCPCS | Performed by: INTERNAL MEDICINE

## 2017-05-26 PROCEDURE — 02HV33Z INSERTION OF INFUSION DEVICE INTO SUPERIOR VENA CAVA, PERCUTANEOUS APPROACH: ICD-10-PCS | Performed by: FAMILY MEDICINE

## 2017-05-26 PROCEDURE — 25010000003 CEFAZOLIN PER 500 MG: Performed by: INTERNAL MEDICINE

## 2017-05-26 PROCEDURE — C1769 GUIDE WIRE: HCPCS | Performed by: INTERNAL MEDICINE

## 2017-05-26 PROCEDURE — 85025 COMPLETE CBC W/AUTO DIFF WBC: CPT | Performed by: FAMILY MEDICINE

## 2017-05-26 PROCEDURE — C1894 INTRO/SHEATH, NON-LASER: HCPCS | Performed by: INTERNAL MEDICINE

## 2017-05-26 PROCEDURE — 94762 N-INVAS EAR/PLS OXIMTRY CONT: CPT

## 2017-05-26 PROCEDURE — C1751 CATH, INF, PER/CENT/MIDLINE: HCPCS

## 2017-05-26 PROCEDURE — 80069 RENAL FUNCTION PANEL: CPT | Performed by: FAMILY MEDICINE

## 2017-05-26 RX ORDER — ALPRAZOLAM 0.5 MG/1
0.5 TABLET ORAL 3 TIMES DAILY PRN
Status: DISCONTINUED | OUTPATIENT
Start: 2017-05-26 | End: 2017-05-27 | Stop reason: HOSPADM

## 2017-05-26 RX ORDER — SODIUM CHLORIDE 9 MG/ML
100 INJECTION, SOLUTION INTRAVENOUS CONTINUOUS
Status: DISCONTINUED | OUTPATIENT
Start: 2017-05-26 | End: 2017-05-27 | Stop reason: HOSPADM

## 2017-05-26 RX ORDER — SODIUM CHLORIDE 0.9 % (FLUSH) 0.9 %
1-10 SYRINGE (ML) INJECTION AS NEEDED
Status: DISCONTINUED | OUTPATIENT
Start: 2017-05-26 | End: 2017-05-27 | Stop reason: HOSPADM

## 2017-05-26 RX ORDER — LIDOCAINE HYDROCHLORIDE 10 MG/ML
INJECTION, SOLUTION INFILTRATION; PERINEURAL AS NEEDED
Status: DISCONTINUED | OUTPATIENT
Start: 2017-05-26 | End: 2017-05-26 | Stop reason: HOSPADM

## 2017-05-26 RX ORDER — SODIUM CHLORIDE 9 MG/ML
1-3 INJECTION, SOLUTION INTRAVENOUS CONTINUOUS
Status: DISCONTINUED | OUTPATIENT
Start: 2017-05-26 | End: 2017-05-27 | Stop reason: HOSPADM

## 2017-05-26 RX ADMIN — CEFAZOLIN: 1 INJECTION, POWDER, FOR SOLUTION INTRAVENOUS at 08:45

## 2017-05-26 RX ADMIN — FUROSEMIDE 20 MG: 20 TABLET ORAL at 08:33

## 2017-05-26 RX ADMIN — ARIPIPRAZOLE 20 MG: 5 TABLET ORAL at 08:32

## 2017-05-26 RX ADMIN — VANCOMYCIN HYDROCHLORIDE 1500 MG: 500 INJECTION, POWDER, LYOPHILIZED, FOR SOLUTION INTRAVENOUS at 15:28

## 2017-05-26 RX ADMIN — OXYBUTYNIN CHLORIDE 10 MG: 5 TABLET, EXTENDED RELEASE ORAL at 08:32

## 2017-05-26 RX ADMIN — CARVEDILOL 6.25 MG: 6.25 TABLET, FILM COATED ORAL at 08:33

## 2017-05-26 RX ADMIN — CEFAZOLIN: 1 INJECTION, POWDER, FOR SOLUTION INTRAVENOUS at 00:57

## 2017-05-26 RX ADMIN — ACETAMINOPHEN 650 MG: 325 TABLET, FILM COATED ORAL at 20:19

## 2017-05-26 RX ADMIN — BUPROPION HYDROCHLORIDE 150 MG: 150 TABLET, FILM COATED, EXTENDED RELEASE ORAL at 08:33

## 2017-05-26 RX ADMIN — HEPARIN SODIUM 5000 UNITS: 5000 INJECTION, SOLUTION INTRAVENOUS; SUBCUTANEOUS at 08:33

## 2017-05-26 RX ADMIN — FAMOTIDINE 20 MG: 20 TABLET, FILM COATED ORAL at 08:33

## 2017-05-26 RX ADMIN — VANCOMYCIN HYDROCHLORIDE 1500 MG: 500 INJECTION, POWDER, LYOPHILIZED, FOR SOLUTION INTRAVENOUS at 02:30

## 2017-05-26 RX ADMIN — SPIRONOLACTONE 25 MG: 25 TABLET, FILM COATED ORAL at 08:33

## 2017-05-26 RX ADMIN — PRAVASTATIN SODIUM 40 MG: 20 TABLET ORAL at 20:19

## 2017-05-26 RX ADMIN — HEPARIN SODIUM 5000 UNITS: 5000 INJECTION, SOLUTION INTRAVENOUS; SUBCUTANEOUS at 20:18

## 2017-05-26 RX ADMIN — CARVEDILOL 6.25 MG: 6.25 TABLET, FILM COATED ORAL at 20:18

## 2017-05-26 RX ADMIN — LEVOTHYROXINE SODIUM 150 MCG: 150 TABLET ORAL at 07:35

## 2017-05-26 RX ADMIN — SODIUM CHLORIDE 1 ML/KG/HR: 9 INJECTION, SOLUTION INTRAVENOUS at 12:00

## 2017-05-26 RX ADMIN — SPIRONOLACTONE 25 MG: 25 TABLET, FILM COATED ORAL at 00:58

## 2017-05-26 RX ADMIN — LITHIUM CARBONATE 450 MG: 450 TABLET, EXTENDED RELEASE ORAL at 20:18

## 2017-05-27 VITALS
RESPIRATION RATE: 20 BRPM | DIASTOLIC BLOOD PRESSURE: 92 MMHG | WEIGHT: 293 LBS | HEART RATE: 59 BPM | SYSTOLIC BLOOD PRESSURE: 146 MMHG | HEIGHT: 68 IN | TEMPERATURE: 97.4 F | OXYGEN SATURATION: 99 % | BODY MASS INDEX: 44.41 KG/M2

## 2017-05-27 LAB
ALBUMIN SERPL-MCNC: 3.5 G/DL (ref 3.5–5)
ANION GAP SERPL CALCULATED.3IONS-SCNC: 13.6 MMOL/L
BACTERIA SPEC ANAEROBE CULT: NO GROWTH
BASOPHILS # BLD AUTO: 0.03 10*3/MM3 (ref 0–0.2)
BASOPHILS NFR BLD AUTO: 0.3 % (ref 0–2.5)
BUN BLD-MCNC: 8 MG/DL (ref 7–20)
BUN/CREAT SERPL: 7.3 (ref 7.1–23.5)
CALCIUM SPEC-SCNC: 9 MG/DL (ref 8.4–10.2)
CHLORIDE SERPL-SCNC: 112 MMOL/L (ref 98–107)
CO2 SERPL-SCNC: 25 MMOL/L (ref 26–30)
CREAT BLD-MCNC: 1.1 MG/DL (ref 0.6–1.3)
DEPRECATED RDW RBC AUTO: 45.5 FL (ref 37–54)
EOSINOPHIL # BLD AUTO: 0.23 10*3/MM3 (ref 0–0.7)
EOSINOPHIL NFR BLD AUTO: 2.7 % (ref 0–7)
ERYTHROCYTE [DISTWIDTH] IN BLOOD BY AUTOMATED COUNT: 14 % (ref 11.5–14.5)
GFR SERPL CREATININE-BSD FRML MDRD: 51 ML/MIN/1.73
GLUCOSE BLD-MCNC: 109 MG/DL (ref 74–98)
GLUCOSE BLDC GLUCOMTR-MCNC: 113 MG/DL (ref 70–130)
GLUCOSE BLDC GLUCOMTR-MCNC: 149 MG/DL (ref 70–130)
GLUCOSE BLDC GLUCOMTR-MCNC: 158 MG/DL (ref 70–130)
HCT VFR BLD AUTO: 32.7 % (ref 37–47)
HGB BLD-MCNC: 10.3 G/DL (ref 12–16)
IMM GRANULOCYTES # BLD: 0.08 10*3/MM3 (ref 0–0.06)
IMM GRANULOCYTES NFR BLD: 0.9 % (ref 0–0.6)
LYMPHOCYTES # BLD AUTO: 2.12 10*3/MM3 (ref 0.6–3.4)
LYMPHOCYTES NFR BLD AUTO: 24.5 % (ref 10–50)
MCH RBC QN AUTO: 28.4 PG (ref 27–31)
MCHC RBC AUTO-ENTMCNC: 31.5 G/DL (ref 30–37)
MCV RBC AUTO: 90.1 FL (ref 81–99)
MONOCYTES # BLD AUTO: 0.68 10*3/MM3 (ref 0–0.9)
MONOCYTES NFR BLD AUTO: 7.9 % (ref 0–12)
NEUTROPHILS # BLD AUTO: 5.5 10*3/MM3 (ref 2–6.9)
NEUTROPHILS NFR BLD AUTO: 63.7 % (ref 37–80)
NRBC BLD MANUAL-RTO: 0 /100 WBC (ref 0–0)
PHOSPHATE SERPL-MCNC: 4.3 MG/DL (ref 2.5–4.5)
PLATELET # BLD AUTO: 371 10*3/MM3 (ref 130–400)
PMV BLD AUTO: 8.7 FL (ref 6–12)
POTASSIUM BLD-SCNC: 3.6 MMOL/L (ref 3.5–5.1)
RBC # BLD AUTO: 3.63 10*6/MM3 (ref 4.2–5.4)
SODIUM BLD-SCNC: 147 MMOL/L (ref 137–145)
VANCOMYCIN TROUGH SERPL-MCNC: 19.56 MCG/ML (ref 5–15)
WBC NRBC COR # BLD: 8.64 10*3/MM3 (ref 4.8–10.8)

## 2017-05-27 PROCEDURE — 82962 GLUCOSE BLOOD TEST: CPT

## 2017-05-27 PROCEDURE — 80202 ASSAY OF VANCOMYCIN: CPT | Performed by: NURSE PRACTITIONER

## 2017-05-27 PROCEDURE — 25010000002 HEPARIN (PORCINE) PER 1000 UNITS: Performed by: FAMILY MEDICINE

## 2017-05-27 PROCEDURE — 63710000001 INSULIN ASPART PER 5 UNITS: Performed by: FAMILY MEDICINE

## 2017-05-27 PROCEDURE — 99238 HOSP IP/OBS DSCHRG MGMT 30/<: CPT | Performed by: FAMILY MEDICINE

## 2017-05-27 PROCEDURE — 85025 COMPLETE CBC W/AUTO DIFF WBC: CPT | Performed by: FAMILY MEDICINE

## 2017-05-27 PROCEDURE — 80069 RENAL FUNCTION PANEL: CPT | Performed by: FAMILY MEDICINE

## 2017-05-27 PROCEDURE — 25010000002 VANCOMYCIN PER 500 MG: Performed by: INTERNAL MEDICINE

## 2017-05-27 RX ADMIN — INSULIN ASPART 2 UNITS: 100 INJECTION, SOLUTION INTRAVENOUS; SUBCUTANEOUS at 11:12

## 2017-05-27 RX ADMIN — BUPROPION HYDROCHLORIDE 150 MG: 150 TABLET, FILM COATED, EXTENDED RELEASE ORAL at 08:12

## 2017-05-27 RX ADMIN — HEPARIN SODIUM 5000 UNITS: 5000 INJECTION, SOLUTION INTRAVENOUS; SUBCUTANEOUS at 08:12

## 2017-05-27 RX ADMIN — OXYBUTYNIN CHLORIDE 10 MG: 5 TABLET, EXTENDED RELEASE ORAL at 08:13

## 2017-05-27 RX ADMIN — VANCOMYCIN HYDROCHLORIDE 1500 MG: 500 INJECTION, POWDER, LYOPHILIZED, FOR SOLUTION INTRAVENOUS at 15:55

## 2017-05-27 RX ADMIN — LEVOTHYROXINE SODIUM 150 MCG: 150 TABLET ORAL at 06:41

## 2017-05-27 RX ADMIN — VANCOMYCIN HYDROCHLORIDE 1500 MG: 500 INJECTION, POWDER, LYOPHILIZED, FOR SOLUTION INTRAVENOUS at 02:30

## 2017-05-27 RX ADMIN — ARIPIPRAZOLE 20 MG: 5 TABLET ORAL at 08:13

## 2017-05-27 RX ADMIN — CARVEDILOL 6.25 MG: 6.25 TABLET, FILM COATED ORAL at 08:13

## 2017-05-29 LAB — GLUCOSE BLDC GLUCOMTR-MCNC: 105 MG/DL (ref 70–130)

## 2017-06-02 ENCOUNTER — LAB REQUISITION (OUTPATIENT)
Dept: LAB | Facility: HOSPITAL | Age: 57
End: 2017-06-02

## 2017-06-02 DIAGNOSIS — N18.2 CHRONIC KIDNEY DISEASE, STAGE II (MILD): ICD-10-CM

## 2017-06-02 DIAGNOSIS — I12.9 HYPERTENSIVE CHRONIC KIDNEY DISEASE WITH STAGE 1 THROUGH STAGE 4 CHRONIC KIDNEY DISEASE, OR UNSPECIFIED CHRONIC KIDNEY DISEASE: ICD-10-CM

## 2017-06-02 DIAGNOSIS — L03.116 CELLULITIS OF LEFT LOWER EXTREMITY: ICD-10-CM

## 2017-06-02 DIAGNOSIS — E11.22 TYPE 2 DIABETES MELLITUS WITH DIABETIC CHRONIC KIDNEY DISEASE (HCC): ICD-10-CM

## 2017-06-02 LAB
ALBUMIN SERPL-MCNC: 3.9 G/DL (ref 3.5–5)
ALBUMIN/GLOB SERPL: 1.3 G/DL (ref 1–2)
ALP SERPL-CCNC: 98 U/L (ref 38–126)
ALT SERPL W P-5'-P-CCNC: 40 U/L (ref 13–69)
ANION GAP SERPL CALCULATED.3IONS-SCNC: 18.7 MMOL/L
AST SERPL-CCNC: 26 U/L (ref 15–46)
BASOPHILS # BLD AUTO: 0.06 10*3/MM3 (ref 0–0.2)
BASOPHILS NFR BLD AUTO: 0.5 % (ref 0–2.5)
BILIRUB SERPL-MCNC: 0.3 MG/DL (ref 0.2–1.3)
BUN BLD-MCNC: 14 MG/DL (ref 7–20)
BUN/CREAT SERPL: 11.7 (ref 7.1–23.5)
CALCIUM SPEC-SCNC: 9.5 MG/DL (ref 8.4–10.2)
CHLORIDE SERPL-SCNC: 106 MMOL/L (ref 98–107)
CO2 SERPL-SCNC: 25 MMOL/L (ref 26–30)
CREAT BLD-MCNC: 1.2 MG/DL (ref 0.6–1.3)
DEPRECATED RDW RBC AUTO: 46.5 FL (ref 37–54)
EOSINOPHIL # BLD AUTO: 0.21 10*3/MM3 (ref 0–0.7)
EOSINOPHIL NFR BLD AUTO: 1.8 % (ref 0–7)
ERYTHROCYTE [DISTWIDTH] IN BLOOD BY AUTOMATED COUNT: 14.5 % (ref 11.5–14.5)
GFR SERPL CREATININE-BSD FRML MDRD: 46 ML/MIN/1.73
GLOBULIN UR ELPH-MCNC: 3 GM/DL
GLUCOSE BLD-MCNC: 74 MG/DL (ref 74–98)
HCT VFR BLD AUTO: 34.5 % (ref 37–47)
HGB BLD-MCNC: 11 G/DL (ref 12–16)
IMM GRANULOCYTES # BLD: 0.05 10*3/MM3 (ref 0–0.06)
IMM GRANULOCYTES NFR BLD: 0.4 % (ref 0–0.6)
LYMPHOCYTES # BLD AUTO: 2 10*3/MM3 (ref 0.6–3.4)
LYMPHOCYTES NFR BLD AUTO: 17.4 % (ref 10–50)
MCH RBC QN AUTO: 28.7 PG (ref 27–31)
MCHC RBC AUTO-ENTMCNC: 31.9 G/DL (ref 30–37)
MCV RBC AUTO: 90.1 FL (ref 81–99)
MONOCYTES # BLD AUTO: 0.77 10*3/MM3 (ref 0–0.9)
MONOCYTES NFR BLD AUTO: 6.7 % (ref 0–12)
NEUTROPHILS # BLD AUTO: 8.42 10*3/MM3 (ref 2–6.9)
NEUTROPHILS NFR BLD AUTO: 73.2 % (ref 37–80)
NRBC BLD MANUAL-RTO: 0 /100 WBC (ref 0–0)
PLATELET # BLD AUTO: 414 10*3/MM3 (ref 130–400)
PMV BLD AUTO: 9.6 FL (ref 6–12)
POTASSIUM BLD-SCNC: 3.7 MMOL/L (ref 3.5–5.1)
PROT SERPL-MCNC: 6.9 G/DL (ref 6.3–8.2)
RBC # BLD AUTO: 3.83 10*6/MM3 (ref 4.2–5.4)
SODIUM BLD-SCNC: 146 MMOL/L (ref 137–145)
WBC NRBC COR # BLD: 11.51 10*3/MM3 (ref 4.8–10.8)

## 2017-06-02 PROCEDURE — 80053 COMPREHEN METABOLIC PANEL: CPT | Performed by: INTERNAL MEDICINE

## 2017-06-02 PROCEDURE — 85025 COMPLETE CBC W/AUTO DIFF WBC: CPT | Performed by: INTERNAL MEDICINE

## 2017-06-08 ENCOUNTER — HOSPITAL ENCOUNTER (INPATIENT)
Facility: HOSPITAL | Age: 57
LOS: 6 days | Discharge: NURSING, REHAB OR LONG-TERM CARE | End: 2017-06-14
Attending: INTERNAL MEDICINE | Admitting: INTERNAL MEDICINE

## 2017-06-08 ENCOUNTER — APPOINTMENT (OUTPATIENT)
Dept: CT IMAGING | Facility: HOSPITAL | Age: 57
End: 2017-06-08

## 2017-06-08 ENCOUNTER — OFFICE VISIT (OUTPATIENT)
Dept: FAMILY MEDICINE CLINIC | Facility: CLINIC | Age: 57
End: 2017-06-08

## 2017-06-08 VITALS
OXYGEN SATURATION: 93 % | HEIGHT: 68 IN | SYSTOLIC BLOOD PRESSURE: 130 MMHG | HEART RATE: 66 BPM | WEIGHT: 293 LBS | DIASTOLIC BLOOD PRESSURE: 74 MMHG | TEMPERATURE: 98.6 F | BODY MASS INDEX: 44.41 KG/M2

## 2017-06-08 DIAGNOSIS — L03.116 CELLULITIS OF LEFT LOWER EXTREMITY: Primary | ICD-10-CM

## 2017-06-08 DIAGNOSIS — Z74.09 IMPAIRED MOBILITY AND ADLS: Primary | ICD-10-CM

## 2017-06-08 DIAGNOSIS — Z78.9 IMPAIRED MOBILITY AND ADLS: Primary | ICD-10-CM

## 2017-06-08 PROBLEM — L03.90 CELLULITIS: Status: ACTIVE | Noted: 2017-06-08

## 2017-06-08 LAB
ALBUMIN SERPL-MCNC: 4.3 G/DL (ref 3.5–5)
ALBUMIN/GLOB SERPL: 1.3 G/DL (ref 1–2)
ALP SERPL-CCNC: 105 U/L (ref 38–126)
ALT SERPL W P-5'-P-CCNC: 67 U/L (ref 13–69)
ANION GAP SERPL CALCULATED.3IONS-SCNC: 16.9 MMOL/L
AST SERPL-CCNC: 50 U/L (ref 15–46)
BASOPHILS # BLD AUTO: 0.07 10*3/MM3 (ref 0–0.2)
BASOPHILS NFR BLD AUTO: 0.7 % (ref 0–2.5)
BILIRUB SERPL-MCNC: 0.4 MG/DL (ref 0.2–1.3)
BUN BLD-MCNC: 25 MG/DL (ref 7–20)
BUN/CREAT SERPL: 15.6 (ref 7.1–23.5)
CALCIUM SPEC-SCNC: 9.4 MG/DL (ref 8.4–10.2)
CHLORIDE SERPL-SCNC: 103 MMOL/L (ref 98–107)
CO2 SERPL-SCNC: 30 MMOL/L (ref 26–30)
CREAT BLD-MCNC: 1.6 MG/DL (ref 0.6–1.3)
DEPRECATED RDW RBC AUTO: 46.7 FL (ref 37–54)
EOSINOPHIL # BLD AUTO: 0.29 10*3/MM3 (ref 0–0.7)
EOSINOPHIL NFR BLD AUTO: 3.1 % (ref 0–7)
ERYTHROCYTE [DISTWIDTH] IN BLOOD BY AUTOMATED COUNT: 14.2 % (ref 11.5–14.5)
GFR SERPL CREATININE-BSD FRML MDRD: 33 ML/MIN/1.73
GLOBULIN UR ELPH-MCNC: 3.3 GM/DL
GLUCOSE BLD-MCNC: 162 MG/DL (ref 74–98)
GLUCOSE BLDC GLUCOMTR-MCNC: 130 MG/DL (ref 70–130)
GLUCOSE BLDC GLUCOMTR-MCNC: 160 MG/DL (ref 70–130)
HCT VFR BLD AUTO: 38.7 % (ref 37–47)
HGB BLD-MCNC: 12.2 G/DL (ref 12–16)
IMM GRANULOCYTES # BLD: 0.07 10*3/MM3 (ref 0–0.06)
IMM GRANULOCYTES NFR BLD: 0.7 % (ref 0–0.6)
LYMPHOCYTES # BLD AUTO: 1.47 10*3/MM3 (ref 0.6–3.4)
LYMPHOCYTES NFR BLD AUTO: 15.6 % (ref 10–50)
MCH RBC QN AUTO: 28.7 PG (ref 27–31)
MCHC RBC AUTO-ENTMCNC: 31.5 G/DL (ref 30–37)
MCV RBC AUTO: 91.1 FL (ref 81–99)
MONOCYTES # BLD AUTO: 0.66 10*3/MM3 (ref 0–0.9)
MONOCYTES NFR BLD AUTO: 7 % (ref 0–12)
NEUTROPHILS # BLD AUTO: 6.85 10*3/MM3 (ref 2–6.9)
NEUTROPHILS NFR BLD AUTO: 72.9 % (ref 37–80)
NRBC BLD MANUAL-RTO: 0 /100 WBC (ref 0–0)
PLATELET # BLD AUTO: 346 10*3/MM3 (ref 130–400)
PMV BLD AUTO: 9.3 FL (ref 6–12)
POTASSIUM BLD-SCNC: 3.9 MMOL/L (ref 3.5–5.1)
PROT SERPL-MCNC: 7.6 G/DL (ref 6.3–8.2)
RBC # BLD AUTO: 4.25 10*6/MM3 (ref 4.2–5.4)
SODIUM BLD-SCNC: 146 MMOL/L (ref 137–145)
WBC NRBC COR # BLD: 9.41 10*3/MM3 (ref 4.8–10.8)

## 2017-06-08 PROCEDURE — 99222 1ST HOSP IP/OBS MODERATE 55: CPT | Performed by: NURSE PRACTITIONER

## 2017-06-08 PROCEDURE — 94799 UNLISTED PULMONARY SVC/PX: CPT

## 2017-06-08 PROCEDURE — 25010000002 PIPERACILLIN SOD-TAZOBACTAM PER 1 G: Performed by: NURSE PRACTITIONER

## 2017-06-08 PROCEDURE — 25010000002 VANCOMYCIN PER 500 MG: Performed by: NURSE PRACTITIONER

## 2017-06-08 PROCEDURE — 25010000002 ENOXAPARIN PER 10 MG: Performed by: NURSE PRACTITIONER

## 2017-06-08 PROCEDURE — 63710000001 INSULIN ASPART PER 5 UNITS: Performed by: NURSE PRACTITIONER

## 2017-06-08 PROCEDURE — 85025 COMPLETE CBC W/AUTO DIFF WBC: CPT | Performed by: NURSE PRACTITIONER

## 2017-06-08 PROCEDURE — 99213 OFFICE O/P EST LOW 20 MIN: CPT | Performed by: INTERNAL MEDICINE

## 2017-06-08 PROCEDURE — 80053 COMPREHEN METABOLIC PANEL: CPT | Performed by: NURSE PRACTITIONER

## 2017-06-08 PROCEDURE — 94660 CPAP INITIATION&MGMT: CPT

## 2017-06-08 PROCEDURE — 82962 GLUCOSE BLOOD TEST: CPT

## 2017-06-08 PROCEDURE — 73700 CT LOWER EXTREMITY W/O DYE: CPT

## 2017-06-08 RX ORDER — LITHIUM CARBONATE 450 MG
450 TABLET, EXTENDED RELEASE ORAL NIGHTLY
Status: DISCONTINUED | OUTPATIENT
Start: 2017-06-08 | End: 2017-06-14 | Stop reason: HOSPADM

## 2017-06-08 RX ORDER — DEXTROSE MONOHYDRATE 25 G/50ML
25 INJECTION, SOLUTION INTRAVENOUS
Status: DISCONTINUED | OUTPATIENT
Start: 2017-06-08 | End: 2017-06-14 | Stop reason: HOSPADM

## 2017-06-08 RX ORDER — PANTOPRAZOLE SODIUM 40 MG/1
40 TABLET, DELAYED RELEASE ORAL EVERY MORNING
Status: DISCONTINUED | OUTPATIENT
Start: 2017-06-09 | End: 2017-06-08 | Stop reason: SDDI

## 2017-06-08 RX ORDER — OXYBUTYNIN CHLORIDE 5 MG/1
5 TABLET, EXTENDED RELEASE ORAL DAILY
Status: DISCONTINUED | OUTPATIENT
Start: 2017-06-09 | End: 2017-06-14 | Stop reason: HOSPADM

## 2017-06-08 RX ORDER — LEVOTHYROXINE SODIUM 0.15 MG/1
150 TABLET ORAL
Status: DISCONTINUED | OUTPATIENT
Start: 2017-06-09 | End: 2017-06-14 | Stop reason: HOSPADM

## 2017-06-08 RX ORDER — ATORVASTATIN CALCIUM 20 MG/1
20 TABLET, FILM COATED ORAL NIGHTLY
Status: DISCONTINUED | OUTPATIENT
Start: 2017-06-08 | End: 2017-06-14 | Stop reason: HOSPADM

## 2017-06-08 RX ORDER — DILTIAZEM HYDROCHLORIDE 240 MG/1
480 CAPSULE, COATED, EXTENDED RELEASE ORAL DAILY
Status: DISCONTINUED | OUTPATIENT
Start: 2017-06-09 | End: 2017-06-14 | Stop reason: HOSPADM

## 2017-06-08 RX ORDER — SODIUM CHLORIDE 0.9 % (FLUSH) 0.9 %
1-10 SYRINGE (ML) INJECTION AS NEEDED
Status: DISCONTINUED | OUTPATIENT
Start: 2017-06-08 | End: 2017-06-14 | Stop reason: HOSPADM

## 2017-06-08 RX ORDER — ONDANSETRON 2 MG/ML
4 INJECTION INTRAMUSCULAR; INTRAVENOUS EVERY 6 HOURS PRN
Status: DISCONTINUED | OUTPATIENT
Start: 2017-06-08 | End: 2017-06-14 | Stop reason: HOSPADM

## 2017-06-08 RX ORDER — FAMOTIDINE 20 MG/1
20 TABLET, FILM COATED ORAL DAILY
Status: DISCONTINUED | OUTPATIENT
Start: 2017-06-09 | End: 2017-06-14 | Stop reason: HOSPADM

## 2017-06-08 RX ORDER — ONDANSETRON 4 MG/1
4 TABLET, FILM COATED ORAL EVERY 6 HOURS PRN
Status: DISCONTINUED | OUTPATIENT
Start: 2017-06-08 | End: 2017-06-14 | Stop reason: HOSPADM

## 2017-06-08 RX ORDER — CARVEDILOL 25 MG/1
25 TABLET ORAL 2 TIMES DAILY WITH MEALS
Status: DISCONTINUED | OUTPATIENT
Start: 2017-06-08 | End: 2017-06-14 | Stop reason: HOSPADM

## 2017-06-08 RX ORDER — ACETAMINOPHEN 325 MG/1
650 TABLET ORAL EVERY 4 HOURS PRN
Status: DISCONTINUED | OUTPATIENT
Start: 2017-06-08 | End: 2017-06-14 | Stop reason: HOSPADM

## 2017-06-08 RX ORDER — ARIPIPRAZOLE 5 MG/1
30 TABLET ORAL NIGHTLY
Status: DISCONTINUED | OUTPATIENT
Start: 2017-06-08 | End: 2017-06-14 | Stop reason: HOSPADM

## 2017-06-08 RX ORDER — BUPROPION HYDROCHLORIDE 150 MG/1
150 TABLET, EXTENDED RELEASE ORAL DAILY
Status: DISCONTINUED | OUTPATIENT
Start: 2017-06-09 | End: 2017-06-14 | Stop reason: HOSPADM

## 2017-06-08 RX ORDER — NICOTINE POLACRILEX 4 MG
20 LOZENGE BUCCAL
Status: DISCONTINUED | OUTPATIENT
Start: 2017-06-08 | End: 2017-06-14 | Stop reason: HOSPADM

## 2017-06-08 RX ORDER — ASPIRIN 81 MG/1
81 TABLET, CHEWABLE ORAL DAILY
Status: DISCONTINUED | OUTPATIENT
Start: 2017-06-08 | End: 2017-06-08 | Stop reason: SDDI

## 2017-06-08 RX ORDER — HYDROCODONE BITARTRATE AND ACETAMINOPHEN 5; 325 MG/1; MG/1
1 TABLET ORAL EVERY 6 HOURS PRN
Status: DISCONTINUED | OUTPATIENT
Start: 2017-06-08 | End: 2017-06-14 | Stop reason: HOSPADM

## 2017-06-08 RX ORDER — ONDANSETRON 4 MG/1
4 TABLET, ORALLY DISINTEGRATING ORAL EVERY 6 HOURS PRN
Status: DISCONTINUED | OUTPATIENT
Start: 2017-06-08 | End: 2017-06-14 | Stop reason: HOSPADM

## 2017-06-08 RX ORDER — FUROSEMIDE 20 MG/1
20 TABLET ORAL DAILY
Status: DISCONTINUED | OUTPATIENT
Start: 2017-06-09 | End: 2017-06-08

## 2017-06-08 RX ADMIN — ATORVASTATIN CALCIUM 20 MG: 20 TABLET, FILM COATED ORAL at 20:23

## 2017-06-08 RX ADMIN — ENOXAPARIN SODIUM 40 MG: 40 INJECTION SUBCUTANEOUS at 18:12

## 2017-06-08 RX ADMIN — INSULIN ASPART 3 UNITS: 100 INJECTION, SOLUTION INTRAVENOUS; SUBCUTANEOUS at 18:12

## 2017-06-08 RX ADMIN — ACETAMINOPHEN 650 MG: 325 TABLET, FILM COATED ORAL at 20:22

## 2017-06-08 RX ADMIN — LITHIUM CARBONATE 450 MG: 450 TABLET, EXTENDED RELEASE ORAL at 20:23

## 2017-06-08 RX ADMIN — ARIPIPRAZOLE 30 MG: 5 TABLET ORAL at 20:24

## 2017-06-08 RX ADMIN — PIPERACILLIN SODIUM AND TAZOBACTAM SODIUM 3.38 G: 3; .375 INJECTION, POWDER, FOR SOLUTION INTRAVENOUS at 18:15

## 2017-06-08 RX ADMIN — VANCOMYCIN HYDROCHLORIDE 1500 MG: 500 INJECTION, POWDER, LYOPHILIZED, FOR SOLUTION INTRAVENOUS at 21:23

## 2017-06-08 NOTE — H&P
[facility]   HISTORY AND PHYSICAL          Name:  Caty Garcia   Age:  57 y.o.  Sex:  female  :  1960  MRN:  [id number]   Visit Number:  [id number]  Admission Date:  2017  Date Of Service:  17  Primary Care Physician:  Jose Katz MD    Chief Complaint:   Left lower extremity cellulitis    History Of Presenting Illness:    This is a 57-year-old female with past medical history significant for chronic venous insufficiency, peripheral vascular disease, morbid obesity, diabetes mellitus type 2, dyslipidemia, hypothyroidism, and chronic essential hypertension who was last admitted to this facility and discharged on 17 were at that time she was treated for left lower extremity cellulitis secondary to MRSA.  She was discharged home on IV antibiotic in the form of vancomycin.  A PICC line was placed at that time.  Dr. Mehta did see and evaluate the patient during that admission as well to rule out peripheral arterial disease for which she underwent abdominal aortogram with runoff and there was no evidence of significant  peripheral vascular disease.  He felt that this is likely chronic venous insufficiency and had planned to do workup on an outpatient basis.    Patient has been getting IV antibiotic in the form of vancomycin.  She went in today to follow-up with Dr. Villarreal in her office.  Dr. Villarreal was concerned that her left lower extremity was not showing any improvement and felt that she would likely need admission and further workup.      Review Of Systems:     General ROS: Patient denies any fevers, chills or loss of consciousness. Complains of generalized weakness.   Psychological ROS: Denies any hallucinations and delusions.  Ophthalmic ROS: Denies any diplopia or transient loss of vision.  ENT ROS: Denies sore throat, nasal congestion or ear pain.   Allergy and Immunology ROS: Denies rash or itching.  Hematological and Lymphatic ROS: Denies neck swelling  or easy bleeding.  Endocrine ROS: Denies any recent unintentional weight gain or loss.  Breast ROS: Denies any pain or swelling.  Respiratory ROS: Denies cough or shortness of breath.   Cardiovascular ROS: Denies chest pain or palpitations. No history of exertional chest pain.   Gastrointestinal ROS: Denies nausea and vomiting. Denies any abdominal pain. No diarrhea.   Genito-Urinary ROS: Denies dysuria or hematuria.  Musculoskeletal ROS: Complains of chronic back pain. No muscle pain. No calf pain.   Neurological ROS: Denies any focal weakness. No loss of consciousness. Denies any numbness. Denies neck pain.   Dermatological ROS: Denies any redness or pruritis.     Past Medical History:    Past Medical History:   Diagnosis Date   • Arthritis    • Bipolar 1 disorder    • Diabetes mellitus    • Disease of thyroid gland    • High blood pressure    • High cholesterol    • Migraine    • Sleep apnea        Past Surgical history:    Past Surgical History:   Procedure Laterality Date   • CHOLECYSTECTOMY     • COLONOSCOPY     • GALLBLADDER SURGERY     • GASTRIC SLEEVE LAPAROSCOPIC     • INTERVENTIONAL RADIOLOGY PROCEDURE N/A 5/26/2017    Procedure: Abdominal Aortagram with Runoff;  Surgeon: Otf Mehta MD;  Location: [facility] INVASIVE LOCATION;  Service:    • TOTAL HIP ARTHROPLASTY Right    • TOTAL HIP ARTHROPLASTY Left        Social History:  Denies smoking, alcohol or illicit drug use.  She does live alone.        Family History:    Family History   Problem Relation Age of Onset   • Hypertension Father    • Hyperlipidemia Father    • Diabetes Father    • Heart attack Father    • Liver cancer Father        Allergies:      Allegra [fexofenadine]; Lamotrigine; Nortriptyline; Prozac [fluoxetine]; Trazodone; and Trazodone and nefazodone    Home Medications:    Prior to Admission Medications     Prescriptions Last Dose Informant Patient Reported? Taking?    ARIPiprazole (ABILIFY) 20 MG tablet   Yes No    Take 30  mg by mouth Every Night.    aspirin 81 MG tablet   Yes No    Take 81 mg by mouth Daily.    buPROPion SR (WELLBUTRIN SR) 150 MG 12 hr tablet   Yes No    Take 150 mg by mouth Daily.    carvedilol (COREG) 6.25 MG tablet   Yes No    Take 25 mg by mouth 2 (Two) Times a Day With Meals.    diltiaZEM CD (CARDIZEM CD) 240 MG 24 hr capsule   Yes No    Take 480 mg by mouth Daily.    ergocalciferol (ERGOCALCIFEROL) 87304 UNITS capsule   Yes No    Take 50,000 Units by mouth 1 (One) Time Per Week.    furosemide (LASIX) 20 MG tablet   Yes No    Take 20 mg by mouth Daily.    glipiZIDE (GLUCOTROL) 10 MG tablet   Yes No    Take 10 mg by mouth 2 (Two) Times a Day Before Meals.    levothyroxine (SYNTHROID, LEVOTHROID) 150 MCG tablet   Yes No    Take 150 mcg by mouth Daily.    lithium (ESKALITH) 450 MG CR tablet   Yes No    Take 450 mg by mouth Every Night.    Multiple Vitamin (MULTI-DAY PO)   Yes No    Take 1 tablet by mouth Daily.    omeprazole (PriLOSEC) 40 MG capsule   Yes No    Take 40 mg by mouth Daily.    simvastatin (ZOCOR) 40 MG tablet   Yes No    Take 40 mg by mouth Every Night.    solifenacin (VESICARE) 10 MG tablet   Yes No    Take 10 mg by mouth Daily.             ED Medications:    Medications - No data to display    Vital Signs:    Temp:  [98.6 °F (37 °C)] 98.6 °F (37 °C)  Heart Rate:  [66] 66  BP: (130)/(74) 130/74    There were no vitals filed for this visit.    There is no height or weight on file to calculate BMI.    Physical Exam:      General Appearance:  Alert and cooperative, not in any acute distress.   Head:  Atraumatic and normocephalic, without obvious abnormality.   Eyes:          PERRLA, conjunctivae and sclerae normal, no Icterus. No pallor. Extra-occular movements are within normal limits.   Ears:  Ears appear intact with no abnormalities noted.   Throat: No oral lesions, no thrush, oral mucosa moist.   Neck: Supple, trachea midline, no thyromegaly, no carotid bruit.   Back:   No kyphoscoliosis present. No  tenderness to palpation,   range of motion normal.   Lungs:   Chest shape is normal. Breath sounds heard bilaterally equally.  No crackles or wheezing. No Pleural rub or bronchial breathing.   Heart:  Normal S1 and S2, no murmur, no gallop, no rub. No JVD   Abdomen:   Normal bowel sounds, no masses, no organomegaly. Soft     non-tender, non-distended, no guarding, no rebound                tenderness   Extremities: Moves all extremities well, no edema, no cyanosis, no clubbing.   Pulses: Pulses palpable and equal bilaterally   Skin: No bleeding, bruising or rash   Lymph nodes:  Psychiatric/Behavior:    No palpable adenopathy  Mood normal, behavior normal   Neurologic: Cranial nerves 2 - 12 grossly intact, sensation intact, Motor power is normal and equal bilaterally.           Labs:    Lab Results (last 24 hours)     ** No results found for the last 24 hours. **          Radiology:    Imaging Results (last 72 hours)     ** No results found for the last 72 hours. **          Assessment and Plan:  1.  Left lower extremity cellulitis secondary to MRSA-we will get repeat cultures.  A CT scan of the lower extremity will be done to rule out abscess.  Dr. Villarreal with infectious disease will be consulted.  We will continue patient on IV antibiotic in the form of vancomycin and add Zosyn as per infectious disease request.  Will consult surgery for evaluation for possible debreidement.  2.  Chronic venous insufficiency-this is to be worked up on an outpatient basis.  Patient underwent an abdominal aortogram with runoff during last admission for which there was no significant evidence of peripheral vascular disease.    3.  Diabetes mellitus type 2 patient is on subcutaneous insulin protocol will monitor blood sugars and titrate accordingly    4.  Acquired hypothyroidism continue home medication    5.  Chronic essential hypertension-we'll continue home medications.    6.  Venous stasis dermatitis    7.  Dyslipidemia continue  home medications    8.  Bed bug infestation-  patient had her home sprayed yesterday.  She did have a bed bug present on her clothing when she presented to the floor.    9.  OSAS- CPAP.          Lima Connolly, LAURENCE  06/08/17  4:08 PM

## 2017-06-08 NOTE — PROGRESS NOTES
Continued Stay Note  TONY Whitley     Patient Name: Caty Garcia  MRN: [id number]  Today's Date: 6/8/2017    Admit Date: 6/8/2017          Discharge Plan       06/08/17 1919    Case Management/Social Work Plan    Plan home, home health home infusion    Patient/Family In Agreement With Plan yes    Additional Comments Pt lives alone does have a friend that checks on her has [facility]  for skilled nursing,  [facility]  provides IV meds,  Pt states she is able to give her own IV antibitiotics              Discharge Codes     None        Expected Discharge Date and Time     Expected Discharge Date Expected Discharge Time    Jun 12, 2017             Michelle Hernandez RN

## 2017-06-08 NOTE — PLAN OF CARE
Problem: Fall Risk (Adult)  Goal: Identify Related Risk Factors and Signs and Symptoms  Outcome: Outcome(s) achieved Date Met:  06/08/17

## 2017-06-08 NOTE — PROGRESS NOTES
Pharmacokinetic Initial Note - Vancomycin    Pharmacy was consulted to dose vancomycin for  Caty Garcia, a 57 y.o. female    (!) 400 lb 4.8 oz (182 kg)    Indication for use: cellulitis due to MRSA; Completed last dose of vancomycin 1500 mg IV q12h yesterday morning (6/7).       Results from last 7 days     Lab Units 06/02/17  1146   WBC 10*3/mm3 11.51*   CREATININE mg/dL 1.20      Estimated Creatinine Clearance: 89.8 mL/min (by C-G formula based on Cr of 1.2).  Temp Readings from Last 1 Encounters:   06/08/17 97 °F (36.1 °C) (Oral)       Other Antimicrobials  Zosyn 3.375g IV every 6 hours    Assessment/Plan  Initiated Vancomycin 1500 mg IV every 12 hours, as a continuation of outpatient dose. Will order Vancomycin trough for 6/10 to assess current regimen. Pharmacy will monitor renal function and adjust dose accordingly.    Saba Landeros, NelD  06/08/17 4:59 PM

## 2017-06-08 NOTE — PLAN OF CARE
Problem: Infection, Risk/Actual (Adult)  Goal: Identify Related Risk Factors and Signs and Symptoms  Outcome: Outcome(s) achieved Date Met:  06/08/17

## 2017-06-08 NOTE — PROGRESS NOTES
Subjective   Caty Garcia is a 57 y.o. female.     Chief Complaint   Patient presents with   • Cellulitis     LLE       History of Present Illness     Patient is a  56 yo morbidly obese female, with h/o chronic venous insufficiency of b/l LE , PVD, DM II, essential HTN, who was recently admitted from 5/21-5/29 for left lower extremity cellulitis secondary to MRSA. She was discharged on IV Vancomycin (wound Cx grew MRSA).  She returns to the clinic. LLE edema and erythema have not improved and she has developed a blistering wound on the anterior and posterior areas, with discharge,    The following portions of the patient's history were reviewed and updated as appropriate: allergies, current medications, past family history, past medical history, past social history, past surgical history and problem list.    Past Medical History:   Diagnosis Date   • Arthritis    • Bipolar 1 disorder    • Diabetes mellitus    • Disease of thyroid gland    • High blood pressure    • High cholesterol    • Migraine    • Sleep apnea        Past Surgical History:   Procedure Laterality Date   • CHOLECYSTECTOMY     • COLONOSCOPY     • GALLBLADDER SURGERY     • GASTRIC SLEEVE LAPAROSCOPIC     • INTERVENTIONAL RADIOLOGY PROCEDURE N/A 5/26/2017    Procedure: Abdominal Aortagram with Runoff;  Surgeon: Otf Mehta MD;  Location: [facility] INVASIVE LOCATION;  Service:    • TOTAL HIP ARTHROPLASTY Right    • TOTAL HIP ARTHROPLASTY Left        No current facility-administered medications on file prior to visit.      Current Outpatient Prescriptions on File Prior to Visit   Medication Sig Dispense Refill   • ARIPiprazole (ABILIFY) 20 MG tablet Take 30 mg by mouth Every Night.     • aspirin 81 MG tablet Take 81 mg by mouth Daily.     • buPROPion SR (WELLBUTRIN SR) 150 MG 12 hr tablet Take 150 mg by mouth Daily.     • carvedilol (COREG) 6.25 MG tablet Take 25 mg by mouth 2 (Two) Times a Day With Meals.     • diltiaZEM CD  (CARDIZEM CD) 240 MG 24 hr capsule Take 480 mg by mouth Daily.     • ergocalciferol (ERGOCALCIFEROL) 57124 UNITS capsule Take 50,000 Units by mouth 1 (One) Time Per Week.     • furosemide (LASIX) 20 MG tablet Take 20 mg by mouth Daily.     • glipiZIDE (GLUCOTROL) 10 MG tablet Take 10 mg by mouth 2 (Two) Times a Day Before Meals.     • levothyroxine (SYNTHROID, LEVOTHROID) 150 MCG tablet Take 150 mcg by mouth Daily.     • lithium (ESKALITH) 450 MG CR tablet Take 450 mg by mouth Every Night.     • Multiple Vitamin (MULTI-DAY PO) Take 1 tablet by mouth Daily.     • omeprazole (PriLOSEC) 40 MG capsule Take 40 mg by mouth Daily.     • simvastatin (ZOCOR) 40 MG tablet Take 40 mg by mouth Every Night.     • solifenacin (VESICARE) 10 MG tablet Take 10 mg by mouth Daily.         Social History     Social History   • Marital status: Single     Spouse name: N/A   • Number of children: N/A   • Years of education: N/A     Occupational History   •  Unemployed     Social History Main Topics   • Smoking status: Never Smoker   • Smokeless tobacco: Not on file   • Alcohol use No   • Drug use: No   • Sexual activity: Defer     Other Topics Concern   • Not on file     Social History Narrative       Review of Systems   Constitutional: Negative for chills, fatigue and fever.   HENT: Negative for congestion, ear pain, rhinorrhea, sinus pressure and sore throat.    Eyes: Negative for visual disturbance.   Respiratory: Negative for cough, chest tightness, shortness of breath and wheezing.    Cardiovascular: Negative for chest pain, palpitations and leg swelling.   Gastrointestinal: Negative for abdominal pain, blood in stool, constipation, diarrhea, nausea and vomiting.   Endocrine: Negative for polydipsia and polyuria.   Genitourinary: Negative for dysuria and hematuria.   Musculoskeletal: Negative for back pain.   Skin:        B/l LE edema, erythema, with blistering wounds   Neurological: Negative for dizziness, light-headedness, numbness  "and headaches.   Psychiatric/Behavioral: Negative for dysphoric mood and sleep disturbance. The patient is not nervous/anxious.        Objective   Blood pressure 130/74, pulse 66, temperature 98.6 °F (37 °C), height 67.5\" (171.5 cm), weight (!) 393 lb (178 kg), SpO2 93 %.    Physical Exam   Constitutional: She is oriented to person, place, and time.   Morbidly obese   HENT:   Head: Atraumatic.   Right Ear: External ear normal.   Left Ear: External ear normal.   Eyes: Conjunctivae are normal. Right eye exhibits no discharge. Left eye exhibits no discharge.   Neck: Normal range of motion. Neck supple.   Cardiovascular: Normal rate and regular rhythm.    No murmur heard.  Pulmonary/Chest: Effort normal and breath sounds normal. She has no wheezes. She has no rales.   Abdominal: Soft. Bowel sounds are normal. She exhibits no distension. There is no tenderness.   Neurological: She is alert and oriented to person, place, and time.   Skin:   + LLE erythema, edema  LLE wound anterior and posterior aspect with greenish foul smelling discharge   Psychiatric: She has a normal mood and affect.   Nursing note and vitals reviewed.    Assessment/Plan   Caty was seen today for cellulitis.    Diagnoses and all orders for this visit:    Cellulitis of left lower extremity      Will refer patient for direct admission and further evaluation.  Patient agrees with the plan.           Return if symptoms worsen or fail to improve.    There are no Patient Instructions on file for this visit.  "

## 2017-06-09 ENCOUNTER — APPOINTMENT (OUTPATIENT)
Dept: ULTRASOUND IMAGING | Facility: HOSPITAL | Age: 57
End: 2017-06-09

## 2017-06-09 LAB
ANION GAP SERPL CALCULATED.3IONS-SCNC: 12.6 MMOL/L
BASOPHILS # BLD AUTO: 0.05 10*3/MM3 (ref 0–0.2)
BASOPHILS NFR BLD AUTO: 0.6 % (ref 0–2.5)
BUN BLD-MCNC: 20 MG/DL (ref 7–20)
BUN/CREAT SERPL: 16.7 (ref 7.1–23.5)
CALCIUM SPEC-SCNC: 9.1 MG/DL (ref 8.4–10.2)
CHLORIDE SERPL-SCNC: 104 MMOL/L (ref 98–107)
CO2 SERPL-SCNC: 32 MMOL/L (ref 26–30)
CREAT BLD-MCNC: 1.2 MG/DL (ref 0.6–1.3)
DEPRECATED RDW RBC AUTO: 47.3 FL (ref 37–54)
EOSINOPHIL # BLD AUTO: 0.32 10*3/MM3 (ref 0–0.7)
EOSINOPHIL NFR BLD AUTO: 3.9 % (ref 0–7)
ERYTHROCYTE [DISTWIDTH] IN BLOOD BY AUTOMATED COUNT: 14.1 % (ref 11.5–14.5)
GFR SERPL CREATININE-BSD FRML MDRD: 46 ML/MIN/1.73
GLUCOSE BLD-MCNC: 105 MG/DL (ref 74–98)
GLUCOSE BLDC GLUCOMTR-MCNC: 105 MG/DL (ref 70–130)
GLUCOSE BLDC GLUCOMTR-MCNC: 137 MG/DL (ref 70–130)
GRAM STN SPEC: NORMAL
GRAM STN SPEC: NORMAL
HCT VFR BLD AUTO: 34.9 % (ref 37–47)
HGB BLD-MCNC: 11 G/DL (ref 12–16)
IMM GRANULOCYTES # BLD: 0.02 10*3/MM3 (ref 0–0.06)
IMM GRANULOCYTES NFR BLD: 0.2 % (ref 0–0.6)
LYMPHOCYTES # BLD AUTO: 1.8 10*3/MM3 (ref 0.6–3.4)
LYMPHOCYTES NFR BLD AUTO: 21.9 % (ref 10–50)
MCH RBC QN AUTO: 28.8 PG (ref 27–31)
MCHC RBC AUTO-ENTMCNC: 31.5 G/DL (ref 30–37)
MCV RBC AUTO: 91.4 FL (ref 81–99)
MONOCYTES # BLD AUTO: 0.65 10*3/MM3 (ref 0–0.9)
MONOCYTES NFR BLD AUTO: 7.9 % (ref 0–12)
NEUTROPHILS # BLD AUTO: 5.39 10*3/MM3 (ref 2–6.9)
NEUTROPHILS NFR BLD AUTO: 65.5 % (ref 37–80)
NRBC BLD MANUAL-RTO: 0 /100 WBC (ref 0–0)
PLATELET # BLD AUTO: 306 10*3/MM3 (ref 130–400)
PMV BLD AUTO: 9.5 FL (ref 6–12)
POTASSIUM BLD-SCNC: 3.6 MMOL/L (ref 3.5–5.1)
RBC # BLD AUTO: 3.82 10*6/MM3 (ref 4.2–5.4)
SODIUM BLD-SCNC: 145 MMOL/L (ref 137–145)
WBC NRBC COR # BLD: 8.23 10*3/MM3 (ref 4.8–10.8)

## 2017-06-09 PROCEDURE — 87075 CULTR BACTERIA EXCEPT BLOOD: CPT | Performed by: NURSE PRACTITIONER

## 2017-06-09 PROCEDURE — 25010000002 VANCOMYCIN PER 500 MG: Performed by: NURSE PRACTITIONER

## 2017-06-09 PROCEDURE — 87205 SMEAR GRAM STAIN: CPT | Performed by: NURSE PRACTITIONER

## 2017-06-09 PROCEDURE — 99221 1ST HOSP IP/OBS SF/LOW 40: CPT | Performed by: SURGERY

## 2017-06-09 PROCEDURE — 94660 CPAP INITIATION&MGMT: CPT

## 2017-06-09 PROCEDURE — 82962 GLUCOSE BLOOD TEST: CPT

## 2017-06-09 PROCEDURE — 63710000001 INSULIN ASPART PER 5 UNITS: Performed by: NURSE PRACTITIONER

## 2017-06-09 PROCEDURE — 93971 EXTREMITY STUDY: CPT

## 2017-06-09 PROCEDURE — 87077 CULTURE AEROBIC IDENTIFY: CPT | Performed by: NURSE PRACTITIONER

## 2017-06-09 PROCEDURE — 99232 SBSQ HOSP IP/OBS MODERATE 35: CPT | Performed by: NURSE PRACTITIONER

## 2017-06-09 PROCEDURE — 87070 CULTURE OTHR SPECIMN AEROBIC: CPT | Performed by: NURSE PRACTITIONER

## 2017-06-09 PROCEDURE — 87186 SC STD MICRODIL/AGAR DIL: CPT | Performed by: NURSE PRACTITIONER

## 2017-06-09 PROCEDURE — 94799 UNLISTED PULMONARY SVC/PX: CPT

## 2017-06-09 PROCEDURE — 85025 COMPLETE CBC W/AUTO DIFF WBC: CPT | Performed by: NURSE PRACTITIONER

## 2017-06-09 PROCEDURE — 25010000002 PIPERACILLIN SOD-TAZOBACTAM PER 1 G: Performed by: NURSE PRACTITIONER

## 2017-06-09 PROCEDURE — 80048 BASIC METABOLIC PNL TOTAL CA: CPT | Performed by: NURSE PRACTITIONER

## 2017-06-09 PROCEDURE — 25010000002 ENOXAPARIN PER 10 MG: Performed by: INTERNAL MEDICINE

## 2017-06-09 RX ADMIN — CARVEDILOL 25 MG: 25 TABLET, FILM COATED ORAL at 20:30

## 2017-06-09 RX ADMIN — ARIPIPRAZOLE 30 MG: 5 TABLET ORAL at 20:30

## 2017-06-09 RX ADMIN — ENOXAPARIN SODIUM 40 MG: 40 INJECTION SUBCUTANEOUS at 20:30

## 2017-06-09 RX ADMIN — PIPERACILLIN SODIUM AND TAZOBACTAM SODIUM 3.38 G: 3; .375 INJECTION, POWDER, FOR SOLUTION INTRAVENOUS at 06:22

## 2017-06-09 RX ADMIN — PIPERACILLIN SODIUM AND TAZOBACTAM SODIUM 3.38 G: 3; .375 INJECTION, POWDER, FOR SOLUTION INTRAVENOUS at 18:21

## 2017-06-09 RX ADMIN — INSULIN ASPART 4 UNITS: 100 INJECTION, SOLUTION INTRAVENOUS; SUBCUTANEOUS at 20:30

## 2017-06-09 RX ADMIN — DILTIAZEM HYDROCHLORIDE 480 MG: 240 CAPSULE, COATED, EXTENDED RELEASE ORAL at 09:58

## 2017-06-09 RX ADMIN — OXYBUTYNIN CHLORIDE 5 MG: 5 TABLET, EXTENDED RELEASE ORAL at 09:58

## 2017-06-09 RX ADMIN — BUPROPION HYDROCHLORIDE 150 MG: 150 TABLET, FILM COATED, EXTENDED RELEASE ORAL at 09:58

## 2017-06-09 RX ADMIN — PIPERACILLIN SODIUM AND TAZOBACTAM SODIUM 3.38 G: 3; .375 INJECTION, POWDER, FOR SOLUTION INTRAVENOUS at 23:45

## 2017-06-09 RX ADMIN — PIPERACILLIN SODIUM AND TAZOBACTAM SODIUM 3.38 G: 3; .375 INJECTION, POWDER, FOR SOLUTION INTRAVENOUS at 00:46

## 2017-06-09 RX ADMIN — VANCOMYCIN HYDROCHLORIDE 1500 MG: 500 INJECTION, POWDER, LYOPHILIZED, FOR SOLUTION INTRAVENOUS at 21:15

## 2017-06-09 RX ADMIN — LEVOTHYROXINE SODIUM 150 MCG: 150 TABLET ORAL at 06:21

## 2017-06-09 RX ADMIN — FAMOTIDINE 20 MG: 20 TABLET, FILM COATED ORAL at 09:58

## 2017-06-09 RX ADMIN — ATORVASTATIN CALCIUM 20 MG: 20 TABLET, FILM COATED ORAL at 20:30

## 2017-06-09 RX ADMIN — LITHIUM CARBONATE 450 MG: 450 TABLET, EXTENDED RELEASE ORAL at 20:30

## 2017-06-09 RX ADMIN — VANCOMYCIN HYDROCHLORIDE 1500 MG: 500 INJECTION, POWDER, LYOPHILIZED, FOR SOLUTION INTRAVENOUS at 09:55

## 2017-06-09 RX ADMIN — PIPERACILLIN SODIUM AND TAZOBACTAM SODIUM 3.38 G: 3; .375 INJECTION, POWDER, FOR SOLUTION INTRAVENOUS at 11:44

## 2017-06-09 RX ADMIN — ACETAMINOPHEN 650 MG: 325 TABLET, FILM COATED ORAL at 20:30

## 2017-06-09 RX ADMIN — CARVEDILOL 25 MG: 25 TABLET, FILM COATED ORAL at 09:58

## 2017-06-09 RX ADMIN — ACETAMINOPHEN 650 MG: 325 TABLET, FILM COATED ORAL at 10:04

## 2017-06-09 NOTE — PAYOR COMM NOTE
"TO:BC  FROM:WILMA LANTIGUA PHONE [phone]  IN CLINICALS REF NUMBER [id number]    Caty Ortega (57 y.o. Female)     Date of Birth Social Security Number Address Home Phone MRN    1960  [address]  [address] [address] [phone] [id number]    Confucianist Marital Status          Unknown Single       Admission Date Admission Type Admitting Provider Attending Provider Department, Room/Bed    17 Elective Raji Almodovar MD Pais, Roshan, MD [facility] MED SURG  4, 406/    Discharge Date Discharge Disposition Discharge Destination                      Attending Provider: Raji Almodovar MD     Allergies:  Allegra [Fexofenadine], Lamotrigine, Nortriptyline, Prozac [Fluoxetine], Trazodone, Trazodone And Nefazodone    Isolation:  Contact   Infection:  MRSA (17), Bed Bugs (17)   Code Status:  FULL    Ht:  67.5\" (171.5 cm)   Wt:  402 lb 4.8 oz (182 kg)    Admission Cmt:  None   Principal Problem:  None                Active Insurance as of 2017     Primary Coverage     Payor Plan Insurance Group Employer/Plan Group    [facility] MEDICARE REPLACEMENT ANTH MEDICARE ADVANTAGE [id number]     Payor Plan Address Payor Plan Phone Number Effective From Effective To    [address] [phone] [address]     [place] [address]       Subscriber Name Subscriber Birth Date Member ID       CATY ORTEGA 1960 [id number]                 Emergency Contacts      (Rel.) Home Phone Work Phone Mobile Phone    Odalis Diallo (Other) [phone] -- --    Sirisha Ng (Friend) -- -- [phone]               History & Physical      LAURENCE Eubanks at 2017  4:07 PM              [facility] HOSPITALIST   HISTORY AND PHYSICAL          Name:  Caty Ortega   Age:  57 y.o.  Sex:  female  :  1960  MRN:  [id number]   Visit Number:  [id number]  Admission Date:  2017  Date Of Service:  " 06/08/17  Primary Care Physician:  Jose Katz MD    Chief Complaint:   Left lower extremity cellulitis    History Of Presenting Illness:    This is a 57-year-old female with past medical history significant for chronic venous insufficiency, peripheral vascular disease, morbid obesity, diabetes mellitus type 2, dyslipidemia, hypothyroidism, and chronic essential hypertension who was last admitted to this facility and discharged on 5/21/17 were at that time she was treated for left lower extremity cellulitis secondary to MRSA.  She was discharged home on IV antibiotic in the form of vancomycin.  A PICC line was placed at that time.  Dr. Mehta did see and evaluate the patient during that admission as well to rule out peripheral arterial disease for which she underwent abdominal aortogram with runoff and there was no evidence of significant  peripheral vascular disease.  He felt that this is likely chronic venous insufficiency and had planned to do workup on an outpatient basis.    Patient has been getting IV antibiotic in the form of vancomycin.  She went in today to follow-up with Dr. Villarreal in her office.  Dr. Villarreal was concerned that her left lower extremity was not showing any improvement and felt that she would likely need admission and further workup.      Review Of Systems:     General ROS: Patient denies any fevers, chills or loss of consciousness. Complains of generalized weakness.   Psychological ROS: Denies any hallucinations and delusions.  Ophthalmic ROS: Denies any diplopia or transient loss of vision.  ENT ROS: Denies sore throat, nasal congestion or ear pain.   Allergy and Immunology ROS: Denies rash or itching.  Hematological and Lymphatic ROS: Denies neck swelling or easy bleeding.  Endocrine ROS: Denies any recent unintentional weight gain or loss.  Breast ROS: Denies any pain or swelling.  Respiratory ROS: Denies cough or shortness of breath.   Cardiovascular ROS: Denies chest pain or  palpitations. No history of exertional chest pain.   Gastrointestinal ROS: Denies nausea and vomiting. Denies any abdominal pain. No diarrhea.   Genito-Urinary ROS: Denies dysuria or hematuria.  Musculoskeletal ROS: Complains of chronic back pain. No muscle pain. No calf pain.   Neurological ROS: Denies any focal weakness. No loss of consciousness. Denies any numbness. Denies neck pain.   Dermatological ROS: Denies any redness or pruritis.     Past Medical History:    Past Medical History:   Diagnosis Date   • Arthritis    • Bipolar 1 disorder    • Diabetes mellitus    • Disease of thyroid gland    • High blood pressure    • High cholesterol    • Migraine    • Sleep apnea        Past Surgical history:    Past Surgical History:   Procedure Laterality Date   • CHOLECYSTECTOMY     • COLONOSCOPY     • GALLBLADDER SURGERY     • GASTRIC SLEEVE LAPAROSCOPIC     • INTERVENTIONAL RADIOLOGY PROCEDURE N/A 5/26/2017    Procedure: Abdominal Aortagram with Runoff;  Surgeon: Otf Mehta MD;  Location: [facility] INVASIVE LOCATION;  Service:    • TOTAL HIP ARTHROPLASTY Right    • TOTAL HIP ARTHROPLASTY Left        Social History:  Denies smoking, alcohol or illicit drug use.  She does live alone.        Family History:    Family History   Problem Relation Age of Onset   • Hypertension Father    • Hyperlipidemia Father    • Diabetes Father    • Heart attack Father    • Liver cancer Father        Allergies:      Allegra [fexofenadine]; Lamotrigine; Nortriptyline; Prozac [fluoxetine]; Trazodone; and Trazodone and nefazodone    Home Medications:    Prior to Admission Medications     Prescriptions Last Dose Informant Patient Reported? Taking?    ARIPiprazole (ABILIFY) 20 MG tablet   Yes No    Take 30 mg by mouth Every Night.    aspirin 81 MG tablet   Yes No    Take 81 mg by mouth Daily.    buPROPion SR (WELLBUTRIN SR) 150 MG 12 hr tablet   Yes No    Take 150 mg by mouth Daily.    carvedilol (COREG) 6.25 MG tablet   Yes No     Take 25 mg by mouth 2 (Two) Times a Day With Meals.    diltiaZEM CD (CARDIZEM CD) 240 MG 24 hr capsule   Yes No    Take 480 mg by mouth Daily.    ergocalciferol (ERGOCALCIFEROL) 78062 UNITS capsule   Yes No    Take 50,000 Units by mouth 1 (One) Time Per Week.    furosemide (LASIX) 20 MG tablet   Yes No    Take 20 mg by mouth Daily.    glipiZIDE (GLUCOTROL) 10 MG tablet   Yes No    Take 10 mg by mouth 2 (Two) Times a Day Before Meals.    levothyroxine (SYNTHROID, LEVOTHROID) 150 MCG tablet   Yes No    Take 150 mcg by mouth Daily.    lithium (ESKALITH) 450 MG CR tablet   Yes No    Take 450 mg by mouth Every Night.    Multiple Vitamin (MULTI-DAY PO)   Yes No    Take 1 tablet by mouth Daily.    omeprazole (PriLOSEC) 40 MG capsule   Yes No    Take 40 mg by mouth Daily.    simvastatin (ZOCOR) 40 MG tablet   Yes No    Take 40 mg by mouth Every Night.    solifenacin (VESICARE) 10 MG tablet   Yes No    Take 10 mg by mouth Daily.             ED Medications:    Medications - No data to display    Vital Signs:    Temp:  [98.6 °F (37 °C)] 98.6 °F (37 °C)  Heart Rate:  [66] 66  BP: (130)/(74) 130/74    There were no vitals filed for this visit.    There is no height or weight on file to calculate BMI.    Physical Exam:      General Appearance:  Alert and cooperative, not in any acute distress.   Head:  Atraumatic and normocephalic, without obvious abnormality.   Eyes:          PERRLA, conjunctivae and sclerae normal, no Icterus. No pallor. Extra-occular movements are within normal limits.   Ears:  Ears appear intact with no abnormalities noted.   Throat: No oral lesions, no thrush, oral mucosa moist.   Neck: Supple, trachea midline, no thyromegaly, no carotid bruit.   Back:   No kyphoscoliosis present. No tenderness to palpation,   range of motion normal.   Lungs:   Chest shape is normal. Breath sounds heard bilaterally equally.  No crackles or wheezing. No Pleural rub or bronchial breathing.   Heart:  Normal S1 and S2, no  murmur, no gallop, no rub. No JVD   Abdomen:   Normal bowel sounds, no masses, no organomegaly. Soft     non-tender, non-distended, no guarding, no rebound                tenderness   Extremities: Moves all extremities well, no edema, no cyanosis, no clubbing.   Pulses: Pulses palpable and equal bilaterally   Skin: No bleeding, bruising or rash   Lymph nodes:  Psychiatric/Behavior:    No palpable adenopathy  Mood normal, behavior normal   Neurologic: Cranial nerves 2 - 12 grossly intact, sensation intact, Motor power is normal and equal bilaterally.           Labs:    Lab Results (last 24 hours)     ** No results found for the last 24 hours. **          Radiology:    Imaging Results (last 72 hours)     ** No results found for the last 72 hours. **          Assessment and Plan:  1.  Left lower extremity cellulitis secondary to MRSA-we will get repeat cultures.  A CT scan of the lower extremity will be done to rule out abscess.  Dr. Villarreal with infectious disease will be consulted.  We will continue patient on IV antibiotic in the form of vancomycin and add Zosyn as per infectious disease request.  Will consult surgery for evaluation for possible debreidement.  2.  Chronic venous insufficiency-this is to be worked up on an outpatient basis.  Patient underwent an abdominal aortogram with runoff during last admission for which there was no significant evidence of peripheral vascular disease.    3.  Diabetes mellitus type 2 patient is on subcutaneous insulin protocol will monitor blood sugars and titrate accordingly    4.  Acquired hypothyroidism continue home medication    5.  Chronic essential hypertension-we'll continue home medications.    6.  Venous stasis dermatitis    7.  Dyslipidemia continue home medications    8.  Bed bug infestation-  patient had her home sprayed yesterday.  She did have a bed bug present on her clothing when she presented to the floor.    9.  OSAS- CPAP.          Lima Connolly  APRN  06/08/17  4:08 PM       Electronically signed by LAURENCE Eubanks at 6/8/2017  4:39 PM        Emergency Department Notes     No notes of this type exist for this encounter.        Hospital Medications (active)       Dose Frequency Start End    acetaminophen (TYLENOL) tablet 650 mg 650 mg Every 4 Hours PRN 6/8/2017     Sig - Route: Take 2 tablets by mouth Every 4 (Four) Hours As Needed for Mild Pain (1-3). - Oral    ARIPiprazole (ABILIFY) tablet 30 mg 30 mg Nightly 6/8/2017     Sig - Route: Take 6 tablets by mouth Every Night. - Oral    atorvastatin (LIPITOR) tablet 20 mg 20 mg Nightly 6/8/2017     Sig - Route: Take 1 tablet by mouth Every Night. - Oral    buPROPion SR (WELLBUTRIN SR) 12 hr tablet 150 mg 150 mg Daily 6/9/2017     Sig - Route: Take 1 tablet by mouth Daily. - Oral    carvedilol (COREG) tablet 25 mg 25 mg 2 Times Daily With Meals 6/8/2017     Sig - Route: Take 1 tablet by mouth 2 (Two) Times a Day With Meals. - Oral    dextrose (D50W) solution 25 g 25 g Every 15 Minutes PRN 6/8/2017     Sig - Route: Infuse 50 mL into a venous catheter Every 15 (Fifteen) Minutes As Needed for Low Blood Sugar (Blood Sugar Less Than 70, Patient Has IV Access - Unresponsive, NPO or Unable To Safely Swallow). - Intravenous    dextrose (GLUTOSE) oral gel 20 g 20 g Every 15 Minutes PRN 6/8/2017     Sig - Route: Take 20 g by mouth Every 15 (Fifteen) Minutes As Needed (Blood Sugar Less Than 70, Patient Alert, Is Not NPO & Can Safely Swallow). - Oral    diltiaZEM CD (CARDIZEM CD) 24 hr capsule 480 mg 480 mg Daily 6/9/2017     Sig - Route: Take 2 capsules by mouth Daily. - Oral    enoxaparin (LOVENOX) syringe 40 mg 40 mg Every 24 Hours 6/8/2017     Sig - Route: Inject 0.4 mL under the skin Daily. - Subcutaneous    famotidine (PEPCID) tablet 20 mg 20 mg Daily 6/9/2017     Sig - Route: Take 1 tablet by mouth Daily. - Oral    glucagon (human recombinant) (GLUCAGEN DIAGNOSTIC) injection 1 mg 1 mg Every 15 Minutes PRN  "6/8/2017     Sig - Route: Inject 1 mg under the skin Every 15 (Fifteen) Minutes As Needed (Blood Glucose Less Than 70 - Patient Without IV Access - Unresponsive, NPO or Unable To Safely Swallow). - Subcutaneous    HYDROcodone-acetaminophen (NORCO) 5-325 MG per tablet 1 tablet 1 tablet Every 6 Hours PRN 6/8/2017 6/18/2017    Sig - Route: Take 1 tablet by mouth Every 6 (Six) Hours As Needed for Moderate Pain (4-6). - Oral    insulin aspart (novoLOG) injection 0-14 Units 0-14 Units 4 Times Daily Before Meals & Nightly 6/8/2017     Sig - Route: Inject 0-14 Units under the skin 4 (Four) Times a Day Before Meals & at Bedtime. - Subcutaneous    levothyroxine (SYNTHROID, LEVOTHROID) tablet 150 mcg 150 mcg Every Early Morning 6/9/2017     Sig - Route: Take 1 tablet by mouth Every Morning. - Oral    lithium (ESKALITH) CR tablet 450 mg 450 mg Nightly 6/8/2017     Sig - Route: Take 1 tablet by mouth Every Night. - Oral    ondansetron (ZOFRAN) injection 4 mg 4 mg Every 6 Hours PRN 6/8/2017     Sig - Route: Infuse 2 mL into a venous catheter Every 6 (Six) Hours As Needed for Nausea or Vomiting. - Intravenous    Linked Group 1:  \"Or\" Linked Group Details        ondansetron (ZOFRAN) tablet 4 mg 4 mg Every 6 Hours PRN 6/8/2017     Sig - Route: Take 1 tablet by mouth Every 6 (Six) Hours As Needed for Nausea or Vomiting. - Oral    Linked Group 1:  \"Or\" Linked Group Details        ondansetron ODT (ZOFRAN-ODT) disintegrating tablet 4 mg 4 mg Every 6 Hours PRN 6/8/2017     Sig - Route: Take 1 tablet by mouth Every 6 (Six) Hours As Needed for Nausea or Vomiting. - Oral    Linked Group 1:  \"Or\" Linked Group Details        oxybutynin XL (DITROPAN-XL) 24 hr tablet 5 mg 5 mg Daily 6/9/2017     Sig - Route: Take 1 tablet by mouth Daily. - Oral    Pharmacy to dose vancomycin  Continuous PRN 6/8/2017     Sig - Route: Continuous As Needed for Consult. - Does not apply    piperacillin-tazobactam (ZOSYN) IVPB 3.375 g in 100 mL NS 3.375 g Every 6 " "Hours 6/8/2017     Sig - Route: Infuse 3.375 g into a venous catheter Every 6 (Six) Hours. - Intravenous    sodium chloride 0.9 % flush 1-10 mL 1-10 mL As Needed 6/8/2017     Sig - Route: Infuse 1-10 mL into a venous catheter As Needed for Line Care. - Intravenous    vanc trough prior to dose on 6/10 @ 0830. Hold dose if level >20.   Once 6/10/2017     Sig - Route: 1 (One) Time. - Does not apply    Linked Group 2:  \"And\" Linked Group Details        vancomycin (VANCOCIN) 1,500 mg in sodium chloride 0.9 % 500 mL IVPB 1,500 mg Every 12 Hours 6/8/2017     Sig - Route: Infuse 1,500 mg into a venous catheter Every 12 (Twelve) Hours. - Intravenous    aspirin chewable tablet 81 mg (Discontinued) 81 mg Daily 6/8/2017 6/8/2017    Sig - Route: Chew 1 tablet Daily. - Oral    Reason for Discontinue: Non-compliance    furosemide (LASIX) tablet 20 mg (Discontinued) 20 mg Daily 6/9/2017 6/8/2017    Sig - Route: Take 1 tablet by mouth Daily. - Oral    pantoprazole (PROTONIX) EC tablet 40 mg (Discontinued) 40 mg Every Morning 6/9/2017 6/8/2017    Sig - Route: Take 1 tablet by mouth Every Morning. - Oral    Reason for Discontinue: Non-compliance          Physician Progress Notes (last 24 hours) (Notes from 6/8/2017  9:43 AM through 6/9/2017  9:43 AM)     No notes of this type exist for this encounter.        "

## 2017-06-09 NOTE — NURSING NOTE
Pt resting comfortably; stated following up with dr. deal in a month; lle slight erythema and swelling, scant drainagage at this time, open area becoming dry/scab/slough 12.0x25.0cm; cleansed with wound cleanser, and some scab fell off to show healthy tissue underneath; covered with non stick dressing, covered with roll gauze; pt stated unsure of cause of infection, does have a cat in house that she has multiple scratches on arms and could be cause; stated she has never had open areas on either leg before but swelling over the years; venous ultrasound being ordered ; stated she was willing to go to [facility] for rehab and wound treatment; discussed importance of follow up for any leg wounds, edema and keeping any wounds clean and covered; i would continue current treatment/orders

## 2017-06-09 NOTE — PLAN OF CARE
Problem: Patient Care Overview (Adult)  Goal: Plan of Care Review  Outcome: Ongoing (interventions implemented as appropriate)    06/09/17 1618   Coping/Psychosocial Response Interventions   Plan Of Care Reviewed With patient   Patient Care Overview   Progress no change         Problem: NPPV/CPAP (Adult)  Intervention: Assess/Manage Patient Tolerance of Noninvasive Ventilation    06/09/17 1618   Respiratory Interventions   Airway/Ventilation Management airway patency maintained;pulmonary hygiene promoted         Goal: Signs and Symptoms of Listed Potential Problems Will be Absent or Manageable (NPPV/CPAP)  Outcome: Ongoing (interventions implemented as appropriate)    06/09/17 1618   NPPV/CPAP   Problems Assessed (NPPV/CPAP) hypoxia/hypoxemia   Problems Present (NPPV/CPAP) hypoxia/hypoxemia

## 2017-06-09 NOTE — PLAN OF CARE
Problem: Patient Care Overview (Adult)  Goal: Plan of Care Review  Outcome: Ongoing (interventions implemented as appropriate)    06/09/17 0509   Coping/Psychosocial Response Interventions   Plan Of Care Reviewed With patient   Patient Care Overview   Progress no change   Outcome Evaluation   Outcome Summary/Follow up Plan pt continues on iv antibiotics as ordered. lle elevated.        Goal: Adult Individualization and Mutuality  Outcome: Ongoing (interventions implemented as appropriate)  Goal: Discharge Needs Assessment  Outcome: Ongoing (interventions implemented as appropriate)    Problem: Cellulitis (Adult)  Goal: Signs and Symptoms of Listed Potential Problems Will be Absent or Manageable (Cellulitis)  Outcome: Ongoing (interventions implemented as appropriate)

## 2017-06-09 NOTE — CONSULTS
Caty Garcia    1960    Primary Care Provider: Jose Katz MD    Reason for Consultation: Cellulitis left leg    Chief Complaint: No chief complaint on file.      Subjective .     History of present illness:  Patient presents with a long history of edema, morbid obesity and chronic bed rest.  She developed an open wound on the left lower leg with that with weeping, swollen and erythematous.  She was admitted yesterday for IV antibiotics and elevation.  The wound has improved significantly since yesterday.  She has an eschar on the wound.  No progression of cellulitis.  Patient denies history of DVT.  I was asked to see patient possibility of debridement and follow-up of wound.    Review of Systems:  Constitutional:  Negative for chills, fever, and unexpected weight change.  HENT: Negative for trouble swallowing and voice change.  Eyes:  Negative for visual disturbance.  Respiratory:  Negative for apnea, cough, chest tightness, shortness of breath, and wheezing.  Cardiovascular:  Negative for chest pain, palpitations, and leg swelling.  Gastrointestinal:  Negative for abdominal distention, abdominal pain, anal bleeding, blood in stool, constipation, diarrhea, nausea, rectal pain, and vomiting.  Musculoskeletal:  Negative for back pain, gait problem, and joint swelling.  Skin:  Negative for color change, rash, and wound  Neurological:  Negative for dizziness, syncope, speech difficulty, weakness, numbness, and headaches.  Hematological:  Negative for adenopathy.  Does not bruise/bleed easily.  Psychiatric/Behavioral:  Negative for confusion.  The patient is not nervous/anxious.        History:    Past Medical History:   Diagnosis Date   • Arthritis    • Bipolar 1 disorder    • Diabetes mellitus    • Disease of thyroid gland    • High blood pressure    • High cholesterol    • Migraine    • Sleep apnea        Past Surgical History:   Procedure Laterality Date   • CHOLECYSTECTOMY     • COLONOSCOPY     •  GALLBLADDER SURGERY     • GASTRIC SLEEVE LAPAROSCOPIC     • INTERVENTIONAL RADIOLOGY PROCEDURE N/A 5/26/2017    Procedure: Abdominal Aortagram with Runoff;  Surgeon: Otf Mehta MD;  Location: [facility] INVASIVE LOCATION;  Service:    • TOTAL HIP ARTHROPLASTY Right    • TOTAL HIP ARTHROPLASTY Left        Family History   Problem Relation Age of Onset   • Hypertension Father    • Hyperlipidemia Father    • Diabetes Father    • Heart attack Father    • Liver cancer Father        Social History     Social History   • Marital status: Single     Spouse name: N/A   • Number of children: N/A   • Years of education: N/A     Occupational History   •  Unemployed     Social History Main Topics   • Smoking status: Never Smoker   • Smokeless tobacco: Not on file   • Alcohol use No   • Drug use: No   • Sexual activity: Defer     Other Topics Concern   • Not on file     Social History Narrative       Allergies:  Allergies   Allergen Reactions   • Allegra [Fexofenadine]    • Lamotrigine    • Nortriptyline    • Prozac [Fluoxetine]    • Trazodone    • Trazodone And Nefazodone        Medications:    Current Facility-Administered Medications:   •  acetaminophen (TYLENOL) tablet 650 mg, 650 mg, Oral, Q4H PRN, Lima Bowling-Travis, APRN, 650 mg at 06/09/17 1004  •  ARIPiprazole (ABILIFY) tablet 30 mg, 30 mg, Oral, Nightly, Lima Bowling-Travis, APRN, 30 mg at 06/08/17 2024  •  atorvastatin (LIPITOR) tablet 20 mg, 20 mg, Oral, Nightly, Lima Bowling-Travis, APRN, 20 mg at 06/08/17 2023  •  buPROPion SR (WELLBUTRIN SR) 12 hr tablet 150 mg, 150 mg, Oral, Daily, Lima Bowling-Travis, APRN, 150 mg at 06/09/17 0958  •  carvedilol (COREG) tablet 25 mg, 25 mg, Oral, BID With Meals, Lima Bowling-Travis, APRN, 25 mg at 06/09/17 0958  •  dextrose (D50W) solution 25 g, 25 g, Intravenous, Q15 Min PRN, Lima Bowling-Travis, APRN  •  dextrose (GLUTOSE) oral gel 20 g, 20 g, Oral, Q15 Min PRN, Lima Bowling-Travis, APRN  •  diltiaZEM CD  (CARDIZEM CD) 24 hr capsule 480 mg, 480 mg, Oral, Daily, Lima Bowling-Travis, APRN, 480 mg at 06/09/17 0958  •  enoxaparin (LOVENOX) syringe 40 mg, 40 mg, Subcutaneous, Q24H, Lima Bowling-Travis, APRN, 40 mg at 06/08/17 1812  •  famotidine (PEPCID) tablet 20 mg, 20 mg, Oral, Daily, Lima Bowling-Travis, APRN, 20 mg at 06/09/17 0958  •  glucagon (human recombinant) (GLUCAGEN DIAGNOSTIC) injection 1 mg, 1 mg, Subcutaneous, Q15 Min PRN, Lima Bowling-Travis, APRN  •  HYDROcodone-acetaminophen (NORCO) 5-325 MG per tablet 1 tablet, 1 tablet, Oral, Q6H PRN, Lima Bowling-Travis, APRN  •  insulin aspart (novoLOG) injection 0-9 Units, 0-9 Units, Subcutaneous, 4x Daily AC & at Bedtime, Lima Bowling-Travis, APRN  •  levothyroxine (SYNTHROID, LEVOTHROID) tablet 150 mcg, 150 mcg, Oral, Q AM, Lima Bowling-Travis, APRN, 150 mcg at 06/09/17 0621  •  lithium (ESKALITH) CR tablet 450 mg, 450 mg, Oral, Nightly, Lima Bowling-Travis, APRN, 450 mg at 06/08/17 2023  •  ondansetron (ZOFRAN) tablet 4 mg, 4 mg, Oral, Q6H PRN **OR** ondansetron ODT (ZOFRAN-ODT) disintegrating tablet 4 mg, 4 mg, Oral, Q6H PRN **OR** ondansetron (ZOFRAN) injection 4 mg, 4 mg, Intravenous, Q6H PRN, Lima Bowling-Travis, APRN  •  oxybutynin XL (DITROPAN-XL) 24 hr tablet 5 mg, 5 mg, Oral, Daily, Lima Bowling-Travis, APRN, 5 mg at 06/09/17 0958  •  Pharmacy to dose vancomycin, , Does not apply, Continuous PRN, Lima Bowling-Tarvis, APRN  •  piperacillin-tazobactam (ZOSYN) IVPB 3.375 g in 100 mL NS, 3.375 g, Intravenous, Q6H, LAURENCE Eubanks, 3.375 g at 06/09/17 0622  •  sodium chloride 0.9 % flush 1-10 mL, 1-10 mL, Intravenous, PRN, LAURENCE Eubanks  •  [START ON 6/10/2017] Vancomycin, Trough, , , Timed **AND** [START ON 6/10/2017] vanc trough prior to dose on 6/10 @ 0830. Hold dose if level >20. , , Does not apply, Once, LAURENCE Eubanks  •  vancomycin (VANCOCIN) 1,500 mg in sodium chloride 0.9 % 500 mL IVPB, 1,500 mg,  Intravenous, Q12H, Lima Cathleen, APRN, 1,500 mg at 06/09/17 0955    Objective     Vital Signs:   Temp:  [97 °F (36.1 °C)-98.6 °F (37 °C)] 97.8 °F (36.6 °C)  Heart Rate:  [55-70] 69  Resp:  [18-20] 20  BP: (116-143)/(65-74) 143/73    Physical Exam:   General Appearance:    Alert, cooperative, in no acute distress   Head:    Normocephalic, without obvious abnormality, atraumatic   Eyes:            Lids and lashes normal, conjunctivae and sclerae normal, no   icterus, no pallor, corneas clear, PERRLA   Throat:   No oral lesions, no thrush, oral mucosa moist   Neck:   No adenopathy, supple, trachea midline, no thyromegaly, no   carotid bruit, no JVD   Lungs:     Clear to auscultation,respirations regular, even and                  unlabored    Heart:    Regular rhythm and normal rate, normal S1 and S2, no            murmur, no gallop, no rub, no click   Chest Wall:    No abnormalities observed   Abdomen:     Normal bowel sounds, no masses, no organomegaly, soft        non-tender, non-distended, no guarding, no rebound                tenderness   Extremities:   Moves all extremities Poorly.  Large eschar on the left lower leg above the ankle and anterior.  Dry eschar present saline at the redness has diminished.  Significant edema and obesity.     Pulses:   Pulses palpable and equal bilaterally   Skin:   No bleeding, bruising or rash   Lymph nodes:   No palpable adenopathy   Neurologic:   Cranial nerves 2 - 12 grossly intact, sensation intact, DTR       present and equal bilaterally   Results Review:   I reviewed the patient's new clinical results.    Assessment/Plan     No diagnosis found.    Severe edema with venous stasis ulcer left lower extremity.  No active abscess or progressive cellulitis seen.  Continue current management with antibiotics, elevation and will also start compression of the left lower leg.  No debridement needed at this time.    I discussed the patients findings and my recommendations with  patient    Real Wright MD  06/09/17  12:54 PM

## 2017-06-09 NOTE — PROGRESS NOTES
PROGRESS NOTE        Date of Admission: 6/8/2017  Length of Stay: 1  Primary Care Physician: Jose Katz MD    Subjective   Chief Complaint:  Follow up LLE cellulitis  HPI:  Patient was seen today.  She is sitting up to bedside in chair.  She was admitted yesterday for left lower extremity cellulitis.  Patient was recently admitted for cellulitis of the left lower extremity for which she has been on outpatient IV vancomycin.  She did finish her last dose of vancomycin the day prior to admission.  She presented to Dr. Villarreal's office for follow-up.  Dr. Villarreal recommended that she needs to be readmitted for more IV antibiotics and evaluation for possible debridement.  A CT scan was done yesterday which did not show any evidence of abscess.  It did show the cellulitis.  General surgery was consulted to evaluate need for debridement.  Dr. Bansal with general surgery has seen and evaluated the patient and does not feel that it needs debridement.  We will have wound care evaluate and give care recommendations.  Surgery has recommended Ace wraps in the lower extremity.  Patient denies any chest pain shortness of breath abdominal pain nausea or vomiting.  She states that she did not sleep as well last night because she did not have her home CPAP unit.  However due to her home bedbug infestation we have explained that we cannot use her home machine at this time.  She does have a hospital CPAP unit at bedside.      Review Of Systems:   Review of Systems   General ROS: Patient denies any fevers, chills or loss of consciousness. Complains of generalized weakness.   Psychological ROS: Denies any hallucinations and delusions.  Ophthalmic ROS: Denies any diplopia or transient loss of vision.  ENT ROS: Denies sore throat, nasal congestion or ear pain.   Allergy and Immunology ROS: Denies rash or itching.  Hematological and Lymphatic ROS: Denies neck swelling or easy bleeding.  Endocrine ROS: Denies any recent unintentional  weight gain or loss.  Breast ROS: Denies any pain or swelling.  Respiratory ROS: Denies cough or shortness of breath.   Cardiovascular ROS: Denies chest pain or palpitations. No history of exertional chest pain.   Gastrointestinal ROS: Denies nausea and vomiting. Denies any abdominal pain. No diarrhea.  Genito-Urinary ROS: Denies dysuria or hematuria.  Musculoskeletal ROS: Denies back pain. No muscle pain. No calf pain.   Neurological ROS: Denies any focal weakness. No loss of consciousness. Denies any numbness. Denies neck pain.   Dermatological ROS: Denies any redness or pruritis.    Objective      Temp:  [97 °F (36.1 °C)-98.6 °F (37 °C)] 97.8 °F (36.6 °C)  Heart Rate:  [55-70] 69  Resp:  [18-20] 20  BP: (116-143)/(65-74) 143/73  Physical Exam    General Appearance:  Alert and cooperative, not in any acute distress.   Head:  Atraumatic and normocephalic, without obvious abnormality.   Eyes:          PERRLA, conjunctivae and sclerae normal, no Icterus. No pallor. Extra-occular movements are within normal limits.   Ears:  Ears appear intact with no abnormalities noted.   Throat: No oral lesions, no thrush, oral mucosa moist.   Neck: Supple, trachea midline, no thyromegaly, no carotid bruit.   Back:   No kyphoscoliosis present. No tenderness to palpation,   range of motion normal.   Lungs:   Chest shape is normal. Breath sounds heard bilaterally equally.  No crackles or wheezing. No Pleural rub or bronchial breathing.   Heart:  Normal S1 and S2, no murmur, no gallop, no rub. No JVD   Abdomen:   Normal bowel sounds, no masses, no organomegaly. Soft     non-tender, non-distended, no guarding, no rebound                Tenderness, obese   Extremities: Moves all extremities well, no edema, no cyanosis, no clubbing.  BLE edema +1-2   Pulses: Pulses palpable and equal bilaterally   Skin: No bleeding, bruising or rash,  LLE with erythema from calf to ankle. She has a large ulcer that is approximately 13cm X 13cm in diameter  on anterior lower extremity over her shin with several smaller ulcers on posterior surface.  The wounds appear to be more dry today with no significant drainage.  She has surrounding erythema and edema.  Venous stasis dermatits.     Lymph nodes: No palpable adenopathy   Neurologic:    Psychiatric/Behavior:     Cranial nerves 2 - 12 grossly intact, sensation intact, Motor power is normal and equal bilaterally.  Mood normal, behavior normal       Results Review:    I have reviewed the labs, radiology results and diagnostic studies.      Results from last 7 days  Lab Units 06/09/17  0607   WBC 10*3/mm3 8.23   HEMOGLOBIN g/dL 11.0*   PLATELETS 10*3/mm3 306       Results from last 7 days  Lab Units 06/09/17  0607   SODIUM mmol/L 145   POTASSIUM mmol/L 3.6   TOTAL CO2 mmol/L 32.0*   CREATININE mg/dL 1.20   GLUCOSE mg/dL 105*       Culture Data:    Radiology Data:   Cardiology Data:    I have reviewed the medications.    Assessment/Plan     Assessment and Plan:  1. Left lower extremity cellulitis secondary to MRSA- we will get repeat cultures. A CT scan of the lower extremity was done which does not show any evidence of abscess.   Dr. Villarreal with infectious disease will be consulted. Patient on IV antibiotic in the form of vancomycin and add Zosyn as per infectious disease request.  Surgery is seen and evaluated the patient and recommend no surgical debridement.  They do recommend Ace wrap in the left lower extremity since this is the anus.  Wound care has been consulted for dressing recommendations.  Venous duplex ordered.    Management has been consulted as patient would like to go to rehabilitation for continued IV antibiotic and aggressive wound care.    2. Chronic venous insufficiency-this is to be worked up on an outpatient basis. Patient underwent an abdominal aortogram with runoff during last admission for which there was no significant evidence of peripheral vascular disease.  She is supposed to follow-up with   Tyson in his office for venous workup on an outpatient basis.  We will ensure that she has an appointment at the time of discharge.     3. Diabetes mellitus type 2 patient is on subcutaneous insulin protocol will monitor blood sugars and titrate accordingly.  Her blood sugars are stable continue to monitor and titrate insulin accordingly.     4. Acquired hypothyroidism continue home medication     5. Chronic essential hypertension-blood pressure is stable continue home medications.     6. Venous stasis dermatitis-outpatient venous workup to be done by Dr. Mehta.     7. Dyslipidemia continue home medications     8. Bed bug infestation- patient had her home sprayed yesterday. She did have a bed bug present on her clothing when she presented to the floor.     9. OSAS- CPAP.    10.  Chronic kidney disease CKD 3-  Patient is at baseline.    Venous duplex done but report pending.       DVT prophylaxis:  Lovenox  Discharge Planning:   LAURENCE Eubanks 06/09/17 11:57 AM

## 2017-06-09 NOTE — PROGRESS NOTES
"Adult Nutrition  Assessment/PES    Patient Name:  Caty Garcia  YOB: 1960  MRN: [id number]  Admit Date:  6/8/2017    Assessment Date:  6/9/2017        Reason for Assessment       06/09/17 1047    Reason for Assessment    Reason For Assessment/Visit diagnosis/disease state;nurse/nurse practitioner consult    Identified At Risk By Screening Criteria BMI;need for education;large or nonhealing wound, burn or pressure ulcer    Diagnosis Diagnosis    Cardiac PVD;HTN;Dyslipidemia    Endocrine DM Type 2;Hypothyroid    Psychosocial Other (comment)   Bipolar    Pulmonary/Critical Care Obstructive sleep apnea;CPAP    Skin Cellulitis    Other diagnosis Obesity                Anthropometrics       06/09/17 1050    Anthropometrics    Height Method Stated    Height 171.5 cm (67.5\")    Weight Method Bed scale    Weight (!)  182 kg (401 lb 3.8 oz)    Ideal Body Weight (IBW)    Ideal Body Weight (IBW), Female 63.4    % Ideal Body Weight 287.64    Body Mass Index (BMI)    BMI (kg/m2) 62.04    BMI Grade greater than 40 - obesity grade III            Labs/Tests/Procedures/Meds       06/09/17 1050    Labs/Tests/Procedures/Meds    Labs/Tests Review Reviewed   High: Glu    Medication Review Reviewed, pertinent;Insulin;Antibiotic            Physical Findings       06/09/17 1051    Physical Appearance    Skin cellulitis;non-healing wound(s)            Estimated/Assessed Needs       06/09/17 1051    Calculation Measurements    Weight Used For Calculations 63.4 kg (139 lb 12.4 oz)   IBW    Height Used for Calculations 1.715 m (5' 7.5\")    Estimated/Assessed Energy Needs    Energy Need Method [place]    Age 57    RMR ([place] Jeor Equation) 1259.56    Activity Factors ([place])  Confined to bed  1.2    Estimated Kcal Range  ~4791-8605 kcal    Estimated/Assessed Protein Needs    Weight Used for Protein Calculation 63.4 kg (139 lb 12.4 oz)    Protein (gm/kg) 1.5    1.5 Gm Protein (gm) 95.1    Estimated " Protein Range ~76-95 gm    Estimated/Assessed Fluid Needs    Fluid Need Method RDA method    RDA Method (mL)  1800            Nutrition Prescription Ordered       06/09/17 1053    Nutrition Prescription PO    Current PO Diet NPO            Evaluation of Received Nutrient/Fluid Intake       06/09/17 1051    PO Evaluation    Number of Days PO Intake Evaluated --   NPO    % PO Intake Pt currently NPO but was eating 88% over 2 meals.              Problem/Interventions:        Problem 1       06/09/17 1055    Nutrition Diagnoses Problem 1    Problem 1 Increased Nutrient Needs    Macronutrient Protein    Micronutrient Vitamin;Mineral    Etiology (related to) MNT for Treatment/Condition    Skin Cellulitis;Non healing wound    Signs/Symptoms (evidenced by) Other (comment)   Increased nutrient needs r/t non-healing wound            Problem 2       06/09/17 1056    Nutrition Diagnoses Problem 2    Problem 2 Overweight/Obesity    Etiology (related to) Factors Affecting Nutrition    Food Habit/Preferences Large Meals;Other   Physical inactivity    Signs/Symptoms (evidenced by) BMI    BMI Greater than 40                  Intervention Goal       06/09/17 1057    Intervention Goal    General Meet nutritional needs for age/condition;Provide information regarding MNT for treatment/condition    PO Meet estimated needs;Establish PO;Advance diet            Nutrition Intervention       06/09/17 1057    Nutrition Intervention    RD/Tech Action Follow Tx progress;Await begin PO;Care plan reviewd;Recommend/ordered    Recommended/Ordered Supplement            Nutrition Prescription       06/09/17 1057    Nutrition Prescription PO    PO Prescription Begin/change supplement   Once medically feasible    Supplement Zyead    Supplement Frequency 2 times a day    New PO Prescription Ordered? No, recommended    Other Orders    Obtain Weight Daily    Obtain Weight Ordered? No, recommended    Supplement Vitamin mineral supplement    Supplement  Ordered? No, recommended    Labs Lipid Profile    Labs Ordered? No, recommended            Education/Evaluation       06/09/17 1052    Education    Education Will Instruct as appropriate;Education topics    Education Topics Diabetes;Cardiac diabetic;CHO counting    Monitor/Evaluation    Monitor Per protocol;I&O;Pertinent labs;Weight        Comments:  Rec #1: Advance diet when medically feasible to previous diet order. Pt was consuming 88% PO intake over 2 meals prior to NPO. Rec #2: Consider MVI with minerals daily. RD to follow pt and suggest nutritional supplements r/t wound care. RD to educate pt on diabetes and heart healthy diets. Consult RD PRN.     Electronically signed by:  Flaca Ta RD  06/09/17 11:01 AM

## 2017-06-09 NOTE — CONSULTS
"Adult Nutrition  Assessment/PES    Patient Name:  Caty Garcia  YOB: 1960  MRN: [id number]  Admit Date:  6/8/2017    Assessment Date:  6/9/2017        Reason for Assessment       06/09/17 1047    Reason for Assessment    Reason For Assessment/Visit diagnosis/disease state;nurse/nurse practitioner consult    Identified At Risk By Screening Criteria BMI;need for education;large or nonhealing wound, burn or pressure ulcer    Diagnosis Diagnosis    Cardiac PVD;HTN;Dyslipidemia    Endocrine DM Type 2;Hypothyroid    Psychosocial Other (comment)   Bipolar    Pulmonary/Critical Care Obstructive sleep apnea;CPAP    Skin Cellulitis    Other diagnosis Obesity                Anthropometrics       06/09/17 1050    Anthropometrics    Height Method Stated    Height 171.5 cm (67.5\")    Weight Method Bed scale    Weight (!)  182 kg (401 lb 3.8 oz)    Ideal Body Weight (IBW)    Ideal Body Weight (IBW), Female 63.4    % Ideal Body Weight 287.64    Body Mass Index (BMI)    BMI (kg/m2) 62.04    BMI Grade greater than 40 - obesity grade III            Labs/Tests/Procedures/Meds       06/09/17 1050    Labs/Tests/Procedures/Meds    Labs/Tests Review Reviewed   High: Glu    Medication Review Reviewed, pertinent;Insulin;Antibiotic            Physical Findings       06/09/17 1051    Physical Appearance    Skin cellulitis;non-healing wound(s)            Estimated/Assessed Needs       06/09/17 1051    Calculation Measurements    Weight Used For Calculations 63.4 kg (139 lb 12.4 oz)   IBW    Height Used for Calculations 1.715 m (5' 7.5\")    Estimated/Assessed Energy Needs    Energy Need Method [place]    Age 57    RMR ([place] Jeor Equation) 1259.56    Activity Factors ([place])  Confined to bed  1.2    Estimated Kcal Range  ~5820-7601 kcal    Estimated/Assessed Protein Needs    Weight Used for Protein Calculation 63.4 kg (139 lb 12.4 oz)    Protein (gm/kg) 1.5    1.5 Gm Protein (gm) 95.1    Estimated " Protein Range ~76-95 gm    Estimated/Assessed Fluid Needs    Fluid Need Method RDA method    RDA Method (mL)  1800            Nutrition Prescription Ordered       06/09/17 1053    Nutrition Prescription PO    Current PO Diet NPO            Evaluation of Received Nutrient/Fluid Intake       06/09/17 1051    PO Evaluation    Number of Days PO Intake Evaluated --   NPO    % PO Intake Pt currently NPO but was eating 88% over 2 meals.              Problem/Interventions:        Problem 1       06/09/17 1055    Nutrition Diagnoses Problem 1    Problem 1 Increased Nutrient Needs    Macronutrient Protein    Micronutrient Vitamin;Mineral    Etiology (related to) MNT for Treatment/Condition    Skin Cellulitis;Non healing wound    Signs/Symptoms (evidenced by) Other (comment)   Increased nutrient needs r/t non-healing wound            Problem 2       06/09/17 1056    Nutrition Diagnoses Problem 2    Problem 2 Overweight/Obesity    Etiology (related to) Factors Affecting Nutrition    Food Habit/Preferences Large Meals;Other   Physical inactivity    Signs/Symptoms (evidenced by) BMI    BMI Greater than 40                  Intervention Goal       06/09/17 1057    Intervention Goal    General Meet nutritional needs for age/condition;Provide information regarding MNT for treatment/condition    PO Meet estimated needs;Establish PO;Advance diet            Nutrition Intervention       06/09/17 1057    Nutrition Intervention    RD/Tech Action Follow Tx progress;Await begin PO;Care plan reviewd;Recommend/ordered    Recommended/Ordered Supplement            Nutrition Prescription       06/09/17 1057    Nutrition Prescription PO    PO Prescription Begin/change supplement   Once medically feasible    Supplement Zeyad    Supplement Frequency 2 times a day    New PO Prescription Ordered? No, recommended    Other Orders    Obtain Weight Daily    Obtain Weight Ordered? No, recommended    Supplement Vitamin mineral supplement    Supplement  Ordered? No, recommended    Labs Lipid Profile    Labs Ordered? No, recommended            Education/Evaluation       06/09/17 0609    Education    Education Will Instruct as appropriate;Education topics    Education Topics Diabetes;Cardiac diabetic;CHO counting    Monitor/Evaluation    Monitor Per protocol;I&O;Pertinent labs;Weight        Comments:  Rec #1: Advance diet when medically feasible to previous diet order. Pt was consuming 88% PO intake over 2 meals prior to NPO. Rec #2: Consider MVI with minerals daily. RD to follow pt and suggest nutritional supplements r/t wound care. RD to educate pt on diabetes and heart healthy diets. Consult RD PRN.     Electronically signed by:  Flaca Ta RD  06/09/17 10:58 AM

## 2017-06-09 NOTE — DISCHARGE PLACEMENT REQUEST
"To [facility], [place] [place], [facility], [facility]     Jayme Hilton    From: Penny Samuels  Phone [phone] Fax     Short Term Rehab for Wound care            Caty Ortega (57 y.o. Female)     Date of Birth Social Security Number Address Home Phone MRN    1960  [address]    [facility] [address] [phone] [id number]    Church Marital Status          Unknown Single       Admission Date Admission Type Admitting Provider Attending Provider Department, Room/Bed    17 Elective Raji Almodovar MD Pais, Roshan, MD [facility] MED SURG  , 406/1    Discharge Date Discharge Disposition Discharge Destination                      Attending Provider: Raji Almodovar MD     Allergies:  Allegra [Fexofenadine], Lamotrigine, Nortriptyline, Prozac [Fluoxetine], Trazodone, Trazodone And Nefazodone    Isolation:  Contact   Infection:  MRSA (17), Bed Bugs (17)   Code Status:  FULL    Ht:  67.5\" (171.5 cm)   Wt:  402 lb 4.8 oz (182 kg)    Admission Cmt:  None   Principal Problem:  None                Active Insurance as of 2017     Primary Coverage     Payor Plan Insurance Group Employer/Plan Group    ANTH MEDICARE REPLACEMENT ANTHEM MEDICARE ADVANTAGE [id number]     Payor Plan Address Payor Plan Phone Number Effective From Effective To    [address] [phone] [address]     [place] [address]       Subscriber Name Subscriber Birth Date Member ID       CATY ORTEGA 1960 [id number]                 Emergency Contacts      (Rel.) Home Phone Work Phone Mobile Phone    Odalis Diallo (Other) [phone] -- --    Sirisha Ng (Friend) -- -- [phone]               History & Physical      LAURENCE Eubanks at 2017  4:07 PM              [facility] HOSPITALIST   HISTORY AND PHYSICAL          Name:  Caty Charisma Ortega   Age:  57 y.o.  Sex:  female  :  1960  MRN:  " [id number]   Visit Number:  [id number]  Admission Date:  6/8/2017  Date Of Service:  06/08/17  Primary Care Physician:  Jose Katz MD    Chief Complaint:   Left lower extremity cellulitis    History Of Presenting Illness:    This is a 57-year-old female with past medical history significant for chronic venous insufficiency, peripheral vascular disease, morbid obesity, diabetes mellitus type 2, dyslipidemia, hypothyroidism, and chronic essential hypertension who was last admitted to this facility and discharged on 5/21/17 were at that time she was treated for left lower extremity cellulitis secondary to MRSA.  She was discharged home on IV antibiotic in the form of vancomycin.  A PICC line was placed at that time.  Dr. Mehta did see and evaluate the patient during that admission as well to rule out peripheral arterial disease for which she underwent abdominal aortogram with runoff and there was no evidence of significant  peripheral vascular disease.  He felt that this is likely chronic venous insufficiency and had planned to do workup on an outpatient basis.    Patient has been getting IV antibiotic in the form of vancomycin.  She went in today to follow-up with Dr. Villarreal in her office.  Dr. Villarreal was concerned that her left lower extremity was not showing any improvement and felt that she would likely need admission and further workup.      Review Of Systems:     General ROS: Patient denies any fevers, chills or loss of consciousness. Complains of generalized weakness.   Psychological ROS: Denies any hallucinations and delusions.  Ophthalmic ROS: Denies any diplopia or transient loss of vision.  ENT ROS: Denies sore throat, nasal congestion or ear pain.   Allergy and Immunology ROS: Denies rash or itching.  Hematological and Lymphatic ROS: Denies neck swelling or easy bleeding.  Endocrine ROS: Denies any recent unintentional weight gain or loss.  Breast ROS: Denies any pain or swelling.  Respiratory ROS:  Denies cough or shortness of breath.   Cardiovascular ROS: Denies chest pain or palpitations. No history of exertional chest pain.   Gastrointestinal ROS: Denies nausea and vomiting. Denies any abdominal pain. No diarrhea.   Genito-Urinary ROS: Denies dysuria or hematuria.  Musculoskeletal ROS: Complains of chronic back pain. No muscle pain. No calf pain.   Neurological ROS: Denies any focal weakness. No loss of consciousness. Denies any numbness. Denies neck pain.   Dermatological ROS: Denies any redness or pruritis.     Past Medical History:    Past Medical History:   Diagnosis Date   • Arthritis    • Bipolar 1 disorder    • Diabetes mellitus    • Disease of thyroid gland    • High blood pressure    • High cholesterol    • Migraine    • Sleep apnea        Past Surgical history:    Past Surgical History:   Procedure Laterality Date   • CHOLECYSTECTOMY     • COLONOSCOPY     • GALLBLADDER SURGERY     • GASTRIC SLEEVE LAPAROSCOPIC     • INTERVENTIONAL RADIOLOGY PROCEDURE N/A 5/26/2017    Procedure: Abdominal Aortagram with Runoff;  Surgeon: Otf Mehta MD;  Location: [facility] INVASIVE LOCATION;  Service:    • TOTAL HIP ARTHROPLASTY Right    • TOTAL HIP ARTHROPLASTY Left        Social History:  Denies smoking, alcohol or illicit drug use.  She does live alone.        Family History:    Family History   Problem Relation Age of Onset   • Hypertension Father    • Hyperlipidemia Father    • Diabetes Father    • Heart attack Father    • Liver cancer Father        Allergies:      Allegra [fexofenadine]; Lamotrigine; Nortriptyline; Prozac [fluoxetine]; Trazodone; and Trazodone and nefazodone    Home Medications:    Prior to Admission Medications     Prescriptions Last Dose Informant Patient Reported? Taking?    ARIPiprazole (ABILIFY) 20 MG tablet   Yes No    Take 30 mg by mouth Every Night.    aspirin 81 MG tablet   Yes No    Take 81 mg by mouth Daily.    buPROPion SR (WELLBUTRIN SR) 150 MG 12 hr tablet   Yes  No    Take 150 mg by mouth Daily.    carvedilol (COREG) 6.25 MG tablet   Yes No    Take 25 mg by mouth 2 (Two) Times a Day With Meals.    diltiaZEM CD (CARDIZEM CD) 240 MG 24 hr capsule   Yes No    Take 480 mg by mouth Daily.    ergocalciferol (ERGOCALCIFEROL) 71872 UNITS capsule   Yes No    Take 50,000 Units by mouth 1 (One) Time Per Week.    furosemide (LASIX) 20 MG tablet   Yes No    Take 20 mg by mouth Daily.    glipiZIDE (GLUCOTROL) 10 MG tablet   Yes No    Take 10 mg by mouth 2 (Two) Times a Day Before Meals.    levothyroxine (SYNTHROID, LEVOTHROID) 150 MCG tablet   Yes No    Take 150 mcg by mouth Daily.    lithium (ESKALITH) 450 MG CR tablet   Yes No    Take 450 mg by mouth Every Night.    Multiple Vitamin (MULTI-DAY PO)   Yes No    Take 1 tablet by mouth Daily.    omeprazole (PriLOSEC) 40 MG capsule   Yes No    Take 40 mg by mouth Daily.    simvastatin (ZOCOR) 40 MG tablet   Yes No    Take 40 mg by mouth Every Night.    solifenacin (VESICARE) 10 MG tablet   Yes No    Take 10 mg by mouth Daily.             ED Medications:    Medications - No data to display    Vital Signs:    Temp:  [98.6 °F (37 °C)] 98.6 °F (37 °C)  Heart Rate:  [66] 66  BP: (130)/(74) 130/74    There were no vitals filed for this visit.    There is no height or weight on file to calculate BMI.    Physical Exam:      General Appearance:  Alert and cooperative, not in any acute distress.   Head:  Atraumatic and normocephalic, without obvious abnormality.   Eyes:          PERRLA, conjunctivae and sclerae normal, no Icterus. No pallor. Extra-occular movements are within normal limits.   Ears:  Ears appear intact with no abnormalities noted.   Throat: No oral lesions, no thrush, oral mucosa moist.   Neck: Supple, trachea midline, no thyromegaly, no carotid bruit.   Back:   No kyphoscoliosis present. No tenderness to palpation,   range of motion normal.   Lungs:   Chest shape is normal. Breath sounds heard bilaterally equally.  No crackles or  wheezing. No Pleural rub or bronchial breathing.   Heart:  Normal S1 and S2, no murmur, no gallop, no rub. No JVD   Abdomen:   Normal bowel sounds, no masses, no organomegaly. Soft     non-tender, non-distended, no guarding, no rebound                tenderness   Extremities: Moves all extremities well, no edema, no cyanosis, no clubbing.   Pulses: Pulses palpable and equal bilaterally   Skin: No bleeding, bruising or rash   Lymph nodes:  Psychiatric/Behavior:    No palpable adenopathy  Mood normal, behavior normal   Neurologic: Cranial nerves 2 - 12 grossly intact, sensation intact, Motor power is normal and equal bilaterally.           Labs:    Lab Results (last 24 hours)     ** No results found for the last 24 hours. **          Radiology:    Imaging Results (last 72 hours)     ** No results found for the last 72 hours. **          Assessment and Plan:  1.  Left lower extremity cellulitis secondary to MRSA-we will get repeat cultures.  A CT scan of the lower extremity will be done to rule out abscess.  Dr. Villarreal with infectious disease will be consulted.  We will continue patient on IV antibiotic in the form of vancomycin and add Zosyn as per infectious disease request.  Will consult surgery for evaluation for possible debreidement.  2.  Chronic venous insufficiency-this is to be worked up on an outpatient basis.  Patient underwent an abdominal aortogram with runoff during last admission for which there was no significant evidence of peripheral vascular disease.    3.  Diabetes mellitus type 2 patient is on subcutaneous insulin protocol will monitor blood sugars and titrate accordingly    4.  Acquired hypothyroidism continue home medication    5.  Chronic essential hypertension-we'll continue home medications.    6.  Venous stasis dermatitis    7.  Dyslipidemia continue home medications    8.  Bed bug infestation-  patient had her home sprayed yesterday.  She did have a bed bug present on her clothing when she  presented to the floor.    9.  OSAS- CPAP.          Lima Connolly, LAURENCE  06/08/17  4:08 PM       Electronically signed by LAURENCE Eubanks at 6/8/2017  4:39 PM        Hospital Medications (active)       Dose Frequency Start End    acetaminophen (TYLENOL) tablet 650 mg 650 mg Every 4 Hours PRN 6/8/2017     Sig - Route: Take 2 tablets by mouth Every 4 (Four) Hours As Needed for Mild Pain (1-3). - Oral    ARIPiprazole (ABILIFY) tablet 30 mg 30 mg Nightly 6/8/2017     Sig - Route: Take 6 tablets by mouth Every Night. - Oral    atorvastatin (LIPITOR) tablet 20 mg 20 mg Nightly 6/8/2017     Sig - Route: Take 1 tablet by mouth Every Night. - Oral    buPROPion SR (WELLBUTRIN SR) 12 hr tablet 150 mg 150 mg Daily 6/9/2017     Sig - Route: Take 1 tablet by mouth Daily. - Oral    carvedilol (COREG) tablet 25 mg 25 mg 2 Times Daily With Meals 6/8/2017     Sig - Route: Take 1 tablet by mouth 2 (Two) Times a Day With Meals. - Oral    dextrose (D50W) solution 25 g 25 g Every 15 Minutes PRN 6/8/2017     Sig - Route: Infuse 50 mL into a venous catheter Every 15 (Fifteen) Minutes As Needed for Low Blood Sugar (Blood Sugar Less Than 70, Patient Has IV Access - Unresponsive, NPO or Unable To Safely Swallow). - Intravenous    dextrose (GLUTOSE) oral gel 20 g 20 g Every 15 Minutes PRN 6/8/2017     Sig - Route: Take 20 g by mouth Every 15 (Fifteen) Minutes As Needed (Blood Sugar Less Than 70, Patient Alert, Is Not NPO & Can Safely Swallow). - Oral    diltiaZEM CD (CARDIZEM CD) 24 hr capsule 480 mg 480 mg Daily 6/9/2017     Sig - Route: Take 2 capsules by mouth Daily. - Oral    enoxaparin (LOVENOX) syringe 40 mg 40 mg Every 24 Hours 6/8/2017     Sig - Route: Inject 0.4 mL under the skin Daily. - Subcutaneous    famotidine (PEPCID) tablet 20 mg 20 mg Daily 6/9/2017     Sig - Route: Take 1 tablet by mouth Daily. - Oral    glucagon (human recombinant) (GLUCAGEN DIAGNOSTIC) injection 1 mg 1 mg Every 15 Minutes PRN 6/8/2017      "Sig - Route: Inject 1 mg under the skin Every 15 (Fifteen) Minutes As Needed (Blood Glucose Less Than 70 - Patient Without IV Access - Unresponsive, NPO or Unable To Safely Swallow). - Subcutaneous    HYDROcodone-acetaminophen (NORCO) 5-325 MG per tablet 1 tablet 1 tablet Every 6 Hours PRN 6/8/2017 6/18/2017    Sig - Route: Take 1 tablet by mouth Every 6 (Six) Hours As Needed for Moderate Pain (4-6). - Oral    insulin aspart (novoLOG) injection 0-14 Units 0-14 Units 4 Times Daily Before Meals & Nightly 6/8/2017     Sig - Route: Inject 0-14 Units under the skin 4 (Four) Times a Day Before Meals & at Bedtime. - Subcutaneous    levothyroxine (SYNTHROID, LEVOTHROID) tablet 150 mcg 150 mcg Every Early Morning 6/9/2017     Sig - Route: Take 1 tablet by mouth Every Morning. - Oral    lithium (ESKALITH) CR tablet 450 mg 450 mg Nightly 6/8/2017     Sig - Route: Take 1 tablet by mouth Every Night. - Oral    ondansetron (ZOFRAN) injection 4 mg 4 mg Every 6 Hours PRN 6/8/2017     Sig - Route: Infuse 2 mL into a venous catheter Every 6 (Six) Hours As Needed for Nausea or Vomiting. - Intravenous    Linked Group 1:  \"Or\" Linked Group Details        ondansetron (ZOFRAN) tablet 4 mg 4 mg Every 6 Hours PRN 6/8/2017     Sig - Route: Take 1 tablet by mouth Every 6 (Six) Hours As Needed for Nausea or Vomiting. - Oral    Linked Group 1:  \"Or\" Linked Group Details        ondansetron ODT (ZOFRAN-ODT) disintegrating tablet 4 mg 4 mg Every 6 Hours PRN 6/8/2017     Sig - Route: Take 1 tablet by mouth Every 6 (Six) Hours As Needed for Nausea or Vomiting. - Oral    Linked Group 1:  \"Or\" Linked Group Details        oxybutynin XL (DITROPAN-XL) 24 hr tablet 5 mg 5 mg Daily 6/9/2017     Sig - Route: Take 1 tablet by mouth Daily. - Oral    Pharmacy to dose vancomycin  Continuous PRN 6/8/2017     Sig - Route: Continuous As Needed for Consult. - Does not apply    piperacillin-tazobactam (ZOSYN) IVPB 3.375 g in 100 mL NS 3.375 g Every 6 Hours 6/8/2017  " "   Sig - Route: Infuse 3.375 g into a venous catheter Every 6 (Six) Hours. - Intravenous    sodium chloride 0.9 % flush 1-10 mL 1-10 mL As Needed 6/8/2017     Sig - Route: Infuse 1-10 mL into a venous catheter As Needed for Line Care. - Intravenous    vanc trough prior to dose on 6/10 @ 0830. Hold dose if level >20.   Once 6/10/2017     Sig - Route: 1 (One) Time. - Does not apply    Linked Group 2:  \"And\" Linked Group Details        vancomycin (VANCOCIN) 1,500 mg in sodium chloride 0.9 % 500 mL IVPB 1,500 mg Every 12 Hours 6/8/2017     Sig - Route: Infuse 1,500 mg into a venous catheter Every 12 (Twelve) Hours. - Intravenous    aspirin chewable tablet 81 mg (Discontinued) 81 mg Daily 6/8/2017 6/8/2017    Sig - Route: Chew 1 tablet Daily. - Oral    Reason for Discontinue: Non-compliance    furosemide (LASIX) tablet 20 mg (Discontinued) 20 mg Daily 6/9/2017 6/8/2017    Sig - Route: Take 1 tablet by mouth Daily. - Oral    pantoprazole (PROTONIX) EC tablet 40 mg (Discontinued) 40 mg Every Morning 6/9/2017 6/8/2017    Sig - Route: Take 1 tablet by mouth Every Morning. - Oral    Reason for Discontinue: Non-compliance          Physician Progress Notes (last 24 hours) (Notes from 6/8/2017  9:40 AM through 6/9/2017  9:40 AM)     No notes of this type exist for this encounter.        Physical Therapy Notes (last 24 hours) (Notes from 6/8/2017  9:40 AM through 6/9/2017  9:40 AM)     No notes of this type exist for this encounter.        Occupational Therapy Notes (last 24 hours) (Notes from 6/8/2017  9:40 AM through 6/9/2017  9:40 AM)     No notes of this type exist for this encounter.        "

## 2017-06-09 NOTE — DISCHARGE INSTR - OTHER ORDERS
If you have any questions about your recovery, please call [phone]. A registered nurse is available 24 hours a day 7 days a week to assist you.

## 2017-06-09 NOTE — H&P
[facility]   HISTORY AND PHYSICAL          Name:  Caty Garcia   Age:  57 y.o.  Sex:  female  :  1960  MRN:  [id number]   Visit Number:  [id number]  Admission Date:  2017  Date Of Service:  17  Primary Care Physician:  Jose Katz MD    Chief Complaint:   Left lower extremity cellulitis    History Of Presenting Illness:    This is a 57-year-old female with past medical history significant for chronic venous insufficiency, peripheral vascular disease, morbid obesity, diabetes mellitus type 2, dyslipidemia, hypothyroidism, and chronic essential hypertension who was last admitted to this facility and discharged on 17 were at that time she was treated for left lower extremity cellulitis secondary to MRSA.  She was discharged home on IV antibiotic in the form of vancomycin.  A PICC line was placed at that time.  Dr. Mehta did see and evaluate the patient during that admission as well to rule out peripheral arterial disease for which she underwent abdominal aortogram with runoff and there was no evidence of significant  peripheral vascular disease.  He felt that this is likely chronic venous insufficiency and had planned to do workup on an outpatient basis.    Patient has been getting IV antibiotic in the form of vancomycin.  She went in today to follow-up with Dr. Villarreal in her office.  Dr. Villarreal was concerned that her left lower extremity was not showing any improvement and felt that she would likely need admission and further workup.      Review Of Systems:     General ROS: Patient denies any fevers, chills or loss of consciousness. Complains of generalized weakness.   Psychological ROS: Denies any hallucinations and delusions.  Ophthalmic ROS: Denies any diplopia or transient loss of vision.  ENT ROS: Denies sore throat, nasal congestion or ear pain.   Allergy and Immunology ROS: Denies rash or itching.  Hematological and Lymphatic ROS: Denies neck swelling  or easy bleeding.  Endocrine ROS: Denies any recent unintentional weight gain or loss.  Breast ROS: Denies any pain or swelling.  Respiratory ROS: Denies cough or shortness of breath.   Cardiovascular ROS: Denies chest pain or palpitations. No history of exertional chest pain.   Gastrointestinal ROS: Denies nausea and vomiting. Denies any abdominal pain. No diarrhea.   Genito-Urinary ROS: Denies dysuria or hematuria.  Musculoskeletal ROS: Complains of chronic back pain. No muscle pain. No calf pain.   Neurological ROS: Denies any focal weakness. No loss of consciousness. Denies any numbness. Denies neck pain.   Dermatological ROS: Denies any redness or pruritis.     Past Medical History:    Past Medical History:   Diagnosis Date   • Arthritis    • Bipolar 1 disorder    • Diabetes mellitus    • Disease of thyroid gland    • High blood pressure    • High cholesterol    • Migraine    • Sleep apnea        Past Surgical history:    Past Surgical History:   Procedure Laterality Date   • CHOLECYSTECTOMY     • COLONOSCOPY     • GALLBLADDER SURGERY     • GASTRIC SLEEVE LAPAROSCOPIC     • INTERVENTIONAL RADIOLOGY PROCEDURE N/A 5/26/2017    Procedure: Abdominal Aortagram with Runoff;  Surgeon: Otf Mehta MD;  Location: [facility] INVASIVE LOCATION;  Service:    • TOTAL HIP ARTHROPLASTY Right    • TOTAL HIP ARTHROPLASTY Left        Social History:  Denies smoking, alcohol or illicit drug use.  She does live alone.        Family History:    Family History   Problem Relation Age of Onset   • Hypertension Father    • Hyperlipidemia Father    • Diabetes Father    • Heart attack Father    • Liver cancer Father        Allergies:      Allegra [fexofenadine]; Lamotrigine; Nortriptyline; Prozac [fluoxetine]; Trazodone; and Trazodone and nefazodone    Home Medications:    Prior to Admission Medications     Prescriptions Last Dose Informant Patient Reported? Taking?    ARIPiprazole (ABILIFY) 20 MG tablet   Yes No    Take 30  mg by mouth Every Night.    aspirin 81 MG tablet   Yes No    Take 81 mg by mouth Daily.    buPROPion SR (WELLBUTRIN SR) 150 MG 12 hr tablet   Yes No    Take 150 mg by mouth Daily.    carvedilol (COREG) 6.25 MG tablet   Yes No    Take 25 mg by mouth 2 (Two) Times a Day With Meals.    diltiaZEM CD (CARDIZEM CD) 240 MG 24 hr capsule   Yes No    Take 480 mg by mouth Daily.    ergocalciferol (ERGOCALCIFEROL) 45314 UNITS capsule   Yes No    Take 50,000 Units by mouth 1 (One) Time Per Week.    furosemide (LASIX) 20 MG tablet   Yes No    Take 20 mg by mouth Daily.    glipiZIDE (GLUCOTROL) 10 MG tablet   Yes No    Take 10 mg by mouth 2 (Two) Times a Day Before Meals.    levothyroxine (SYNTHROID, LEVOTHROID) 150 MCG tablet   Yes No    Take 150 mcg by mouth Daily.    lithium (ESKALITH) 450 MG CR tablet   Yes No    Take 450 mg by mouth Every Night.    Multiple Vitamin (MULTI-DAY PO)   Yes No    Take 1 tablet by mouth Daily.    omeprazole (PriLOSEC) 40 MG capsule   Yes No    Take 40 mg by mouth Daily.    simvastatin (ZOCOR) 40 MG tablet   Yes No    Take 40 mg by mouth Every Night.    solifenacin (VESICARE) 10 MG tablet   Yes No    Take 10 mg by mouth Daily.             ED Medications:    Medications   sodium chloride 0.9 % flush 1-10 mL (not administered)   acetaminophen (TYLENOL) tablet 650 mg (650 mg Oral Given 6/9/17 1004)   ondansetron (ZOFRAN) tablet 4 mg (not administered)     Or   ondansetron ODT (ZOFRAN-ODT) disintegrating tablet 4 mg (not administered)     Or   ondansetron (ZOFRAN) injection 4 mg (not administered)   enoxaparin (LOVENOX) syringe 40 mg (40 mg Subcutaneous Given 6/8/17 1812)   Pharmacy to dose vancomycin (not administered)   piperacillin-tazobactam (ZOSYN) IVPB 3.375 g in 100 mL NS (3.375 g Intravenous New Bag 6/9/17 8063)   HYDROcodone-acetaminophen (NORCO) 5-325 MG per tablet 1 tablet (not administered)   dextrose (GLUTOSE) oral gel 20 g (not administered)   dextrose (D50W) solution 25 g (not  administered)   glucagon (human recombinant) (GLUCAGEN DIAGNOSTIC) injection 1 mg (not administered)   insulin aspart (novoLOG) injection 0-14 Units (0 Units Subcutaneous Not Given 6/9/17 0631)   ARIPiprazole (ABILIFY) tablet 30 mg (30 mg Oral Given 6/8/17 2024)   buPROPion SR (WELLBUTRIN SR) 12 hr tablet 150 mg (150 mg Oral Given 6/9/17 0958)   carvedilol (COREG) tablet 25 mg (25 mg Oral Given 6/9/17 0958)   diltiaZEM CD (CARDIZEM CD) 24 hr capsule 480 mg (480 mg Oral Given 6/9/17 0958)   levothyroxine (SYNTHROID, LEVOTHROID) tablet 150 mcg (150 mcg Oral Given 6/9/17 0621)   lithium (ESKALITH) CR tablet 450 mg (450 mg Oral Given 6/8/17 2023)   atorvastatin (LIPITOR) tablet 20 mg (20 mg Oral Given 6/8/17 2023)   oxybutynin XL (DITROPAN-XL) 24 hr tablet 5 mg (5 mg Oral Given 6/9/17 0958)   vancomycin (VANCOCIN) 1,500 mg in sodium chloride 0.9 % 500 mL IVPB (1,500 mg Intravenous New Bag 6/9/17 0955)   vanc trough prior to dose on 6/10 @ 0830. Hold dose if level >20.  (not administered)   famotidine (PEPCID) tablet 20 mg (20 mg Oral Given 6/9/17 0958)       Vital Signs:    Temp:  [97 °F (36.1 °C)-98.6 °F (37 °C)] 97.8 °F (36.6 °C)  Heart Rate:  [55-70] 69  Resp:  [18-20] 20  BP: (116-143)/(65-74) 143/73    Last 3 weights    06/08/17  1634 06/09/17  0540 06/09/17  1050   Weight: (!) 400 lb 4.8 oz (182 kg) (!) 402 lb 4.8 oz (182 kg) (!) 401 lb 3.8 oz (182 kg)       Body mass index is 61.92 kg/(m^2).    Physical Exam:      General Appearance:  Alert and cooperative, not in any acute distress.   Head:  Atraumatic and normocephalic, without obvious abnormality.   Eyes:          PERRLA, conjunctivae and sclerae normal, no Icterus. No pallor. Extra-occular movements are within normal limits.   Ears:  Ears appear intact with no abnormalities noted.   Throat: No oral lesions, no thrush, oral mucosa moist.   Neck: Supple, trachea midline, no thyromegaly, no carotid bruit.   Back:   No kyphoscoliosis present. No tenderness to  palpation,   range of motion normal.   Lungs:   Chest shape is normal. Breath sounds heard bilaterally equally.  No crackles or wheezing. No Pleural rub or bronchial breathing.   Heart:  Normal S1 and S2, no murmur, no gallop, no rub. No JVD   Abdomen:   Normal bowel sounds, no masses, no organomegaly. Soft     non-tender, non-distended, no guarding, no rebound                Tenderness, obese   Extremities: Moves all extremities well, no edema, no cyanosis, no clubbing.  Bilateral lower extremity edema   Pulses: Pulses palpable and equal bilaterally   Skin: No bleeding, bruising or rash.  Bilateral lower edema.  LLE with erythema from calf to ankle.  She has a large ulcer that is approximately 13cm X 13cm in diameter on anterior lower extremity over her shin with several smaller ulcers on posterior surface.  There is pustulant drainage from the ulcers.  They are moist in appearance.  She has surrounding erythema and edema   Lymph nodes:  Psychiatric/Behavior:    No palpable adenopathy  Mood normal, behavior normal   Neurologic: Cranial nerves 2 - 12 grossly intact, sensation intact, Motor power is normal and equal bilaterally.           Labs:    Lab Results (last 24 hours)     Procedure Component Value Units Date/Time    POC Glucose Fingerstick [[id number]]  (Abnormal) Collected:  06/08/17 1639    Specimen:  Blood Updated:  06/08/17 1700     Glucose 160 (H) mg/dL       Serial Number: [id number]    : [id number]       CBC & Differential [[id number]] Collected:  06/08/17 1655    Specimen:  Blood Updated:  06/08/17 1713    Narrative:       The following orders were created for panel order CBC & Differential.  Procedure                               Abnormality         Status                     ---------                               -----------         ------                     CBC Auto Differential[[id number]]        Abnormal            Final result                 Please view results for these tests on the  individual orders.    CBC Auto Differential [[id number]]  (Abnormal) Collected:  06/08/17 1655    Specimen:  Blood Updated:  06/08/17 1713     WBC 9.41 10*3/mm3      RBC 4.25 10*6/mm3      Hemoglobin 12.2 g/dL      Hematocrit 38.7 %      MCV 91.1 fL      MCH 28.7 pg      MCHC 31.5 g/dL      RDW 14.2 %      RDW-SD 46.7 fl      MPV 9.3 fL      Platelets 346 10*3/mm3      Neutrophil % 72.9 %      Lymphocyte % 15.6 %      Monocyte % 7.0 %      Eosinophil % 3.1 %      Basophil % 0.7 %      Immature Grans % 0.7 (H) %      Neutrophils, Absolute 6.85 10*3/mm3      Lymphocytes, Absolute 1.47 10*3/mm3      Monocytes, Absolute 0.66 10*3/mm3      Eosinophils, Absolute 0.29 10*3/mm3      Basophils, Absolute 0.07 10*3/mm3      Immature Grans, Absolute 0.07 (H) 10*3/mm3      nRBC 0.0 /100 WBC     Comprehensive Metabolic Panel [[id number]]  (Abnormal) Collected:  06/08/17 1655    Specimen:  Blood Updated:  06/08/17 1727     Glucose 162 (H) mg/dL      BUN 25 (H) mg/dL      Creatinine 1.60 (H) mg/dL      Sodium 146 (H) mmol/L      Potassium 3.9 mmol/L      Chloride 103 mmol/L      CO2 30.0 mmol/L      Calcium 9.4 mg/dL      Total Protein 7.6 g/dL      Albumin 4.30 g/dL      ALT (SGPT) 67 U/L      AST (SGOT) 50 (H) U/L      Alkaline Phosphatase 105 U/L      Total Bilirubin 0.4 mg/dL      eGFR Non African Amer 33 (L) mL/min/1.73      Globulin 3.3 gm/dL      A/G Ratio 1.3 g/dL      BUN/Creatinine Ratio 15.6     Anion Gap 16.9 mmol/L     Narrative:       Abnormal estimated GFR should be followed by more specific studies to confirm end stage chronic renal disease. The equation used for calculation may not be accurate for patients less than 19 years old, greater than 70 years old, patients at extremes of weight, malnutrition, or with acute renal dysfunction.    POC Glucose Fingerstick [[id number]]  (Normal) Collected:  06/08/17 2009    Specimen:  Blood Updated:  06/08/17 2030     Glucose 130 mg/dL       Serial Number: [id number]    :  [id number]       POC Glucose Fingerstick [[id number]]  (Normal) Collected:  06/09/17 0606    Specimen:  Blood Updated:  06/09/17 0616     Glucose 105 mg/dL       Serial Number: [id number]    : [id number]       CBC Auto Differential [[id number]]  (Abnormal) Collected:  06/09/17 0607    Specimen:  Blood Updated:  06/09/17 0643     WBC 8.23 10*3/mm3      RBC 3.82 (L) 10*6/mm3      Hemoglobin 11.0 (L) g/dL      Hematocrit 34.9 (L) %      MCV 91.4 fL      MCH 28.8 pg      MCHC 31.5 g/dL      RDW 14.1 %      RDW-SD 47.3 fl      MPV 9.5 fL      Platelets 306 10*3/mm3      Neutrophil % 65.5 %      Lymphocyte % 21.9 %      Monocyte % 7.9 %      Eosinophil % 3.9 %      Basophil % 0.6 %      Immature Grans % 0.2 %      Neutrophils, Absolute 5.39 10*3/mm3      Lymphocytes, Absolute 1.80 10*3/mm3      Monocytes, Absolute 0.65 10*3/mm3      Eosinophils, Absolute 0.32 10*3/mm3      Basophils, Absolute 0.05 10*3/mm3      Immature Grans, Absolute 0.02 10*3/mm3      nRBC 0.0 /100 WBC     Basic Metabolic Panel [[id number]]  (Abnormal) Collected:  06/09/17 0607    Specimen:  Blood Updated:  06/09/17 0659     Glucose 105 (H) mg/dL      BUN 20 mg/dL      Creatinine 1.20 mg/dL      Sodium 145 mmol/L      Potassium 3.6 mmol/L      Chloride 104 mmol/L      CO2 32.0 (H) mmol/L      Calcium 9.1 mg/dL      eGFR Non African Amer 46 (L) mL/min/1.73      BUN/Creatinine Ratio 16.7     Anion Gap 12.6 mmol/L     Narrative:       Abnormal estimated GFR should be followed by more specific studies to confirm end stage chronic renal disease. The equation used for calculation may not be accurate for patients less than 19 years old, greater than 70 years old, patients at extremes of weight, malnutrition, or with acute renal dysfunction.          Radiology:    Imaging Results (last 72 hours)     ** No results found for the last 72 hours. **          Assessment and Plan:  1.  Left lower extremity cellulitis secondary to MRSA-we will get repeat cultures.  A CT  scan of the lower extremity will be done to rule out abscess.  Dr. Villarreal with infectious disease will be consulted.  We will continue patient on IV antibiotic in the form of vancomycin and add Zosyn as per infectious disease request.  Will consult surgery for evaluation for possible debreidement.  2.  Chronic venous insufficiency-this is to be worked up on an outpatient basis.  Patient underwent an abdominal aortogram with runoff during last admission for which there was no significant evidence of peripheral vascular disease.    3.  Diabetes mellitus type 2 patient is on subcutaneous insulin protocol will monitor blood sugars and titrate accordingly    4.  Acquired hypothyroidism continue home medication    5.  Chronic essential hypertension-we'll continue home medications.    6.  Venous stasis dermatitis    7.  Dyslipidemia continue home medications    8.  Bed bug infestation-  patient had her home sprayed yesterday.  She did have a bed bug present on her clothing when she presented to the floor.    9.  OSAS- CPAP.      Lovenox for DVT prophylaxis     Lima Connolly, LAURENCE  06/09/17  11:48 AM

## 2017-06-09 NOTE — PROGRESS NOTES
Continued Stay Note   [facility]     Patient Name: Caty Garcia  MRN: [id number]  Today's Date: 6/9/2017    Admit Date: 6/8/2017          Discharge Plan       06/09/17 0951    Case Management/Social Work Plan    Plan Spoke to pt in room, felicia Amato feels will need short term rehab for more extensive wound care.  Lives in [facility] but facitites do not accept her insurance.  Pt states she can wrap her leg but if needs much more would like to go to rehab.  Pt informed  [facility] facilities are out of network with insurance.  Ok with pt to fax to referral to [facility] and [facility].  Referrals sent to [facility], [facility] , [facility]  and  to [facility].  Cm will continue to follow.               Discharge Codes     None        Expected Discharge Date and Time     Expected Discharge Date Expected Discharge Time    Jun 12, 2017             Penny Samuels

## 2017-06-09 NOTE — PLAN OF CARE
Problem: Patient Care Overview (Adult)  Goal: Plan of Care Review  Outcome: Ongoing (interventions implemented as appropriate)    06/09/17 1413   Coping/Psychosocial Response Interventions   Plan Of Care Reviewed With patient   Patient Care Overview   Progress no change   Outcome Evaluation   Outcome Summary/Follow up Plan (discussed importance of cat scratch/cleanliness/covering wou)         Problem: Skin Integrity Impairment, Risk/Actual (Adult)  Goal: Identify Related Risk Factors and Signs and Symptoms  Outcome: Ongoing (interventions implemented as appropriate)    06/09/17 1413   Skin Integrity Impairment, Risk/Actual   Skin Integrity Impairment, Risk/Actual: Related Risk Factors edema;environmental exposure;fluid/nutrition status;immobility;infection/disease process;metabolic imbalance;moisture;skin disorders   Signs and Symptoms (Skin Integrity Impairment) bulla/blister/vesicle;increased colony count;edema;inflammation       Goal: Skin Integrity/Wound Healing  Outcome: Ongoing (interventions implemented as appropriate)    06/09/17 1413   Skin Integrity Impairment, Risk/Actual (Adult)   Skin Integrity/Wound Healing making progress toward outcome

## 2017-06-09 NOTE — PLAN OF CARE
Problem: NPPV/CPAP (Adult)  Intervention: Monitor/Manage Anxiety Related to NPPV/CPAP    06/08/17 2252   Coping Strategies   Trust Relationship/Rapport care explained;choices provided;questions answered;questions encouraged       Intervention: Prevent Aspiration During Therapy    06/08/17 2252   Positioning   Head Of Bed (HOB) Position HOB elevated         Goal: Signs and Symptoms of Listed Potential Problems Will be Absent or Manageable (NPPV/CPAP)  Outcome: Ongoing (interventions implemented as appropriate)    06/08/17 2252   NPPV/CPAP   Problems Assessed (NPPV/CPAP) hypoxia/hypoxemia;dry mucous membranes;situational response;skin breakdown   Problems Present (NPPV/CPAP) none

## 2017-06-10 PROBLEM — E66.01 MORBID OBESITY WITH BMI OF 60.0-69.9, ADULT (HCC): Status: ACTIVE | Noted: 2017-06-10

## 2017-06-10 LAB
ANION GAP SERPL CALCULATED.3IONS-SCNC: 12.5 MMOL/L
BASOPHILS # BLD AUTO: 0.05 10*3/MM3 (ref 0–0.2)
BASOPHILS NFR BLD AUTO: 0.8 % (ref 0–2.5)
BUN BLD-MCNC: 15 MG/DL (ref 7–20)
BUN/CREAT SERPL: 12.5 (ref 7.1–23.5)
CALCIUM SPEC-SCNC: 8.8 MG/DL (ref 8.4–10.2)
CHLORIDE SERPL-SCNC: 105 MMOL/L (ref 98–107)
CO2 SERPL-SCNC: 30 MMOL/L (ref 26–30)
CREAT BLD-MCNC: 1.2 MG/DL (ref 0.6–1.3)
DEPRECATED RDW RBC AUTO: 46.7 FL (ref 37–54)
EOSINOPHIL # BLD AUTO: 0.34 10*3/MM3 (ref 0–0.7)
EOSINOPHIL NFR BLD AUTO: 5.4 % (ref 0–7)
ERYTHROCYTE [DISTWIDTH] IN BLOOD BY AUTOMATED COUNT: 14 % (ref 11.5–14.5)
GFR SERPL CREATININE-BSD FRML MDRD: 46 ML/MIN/1.73
GLUCOSE BLD-MCNC: 122 MG/DL (ref 74–98)
GLUCOSE BLDC GLUCOMTR-MCNC: 119 MG/DL (ref 70–130)
GLUCOSE BLDC GLUCOMTR-MCNC: 169 MG/DL (ref 70–130)
GLUCOSE BLDC GLUCOMTR-MCNC: 176 MG/DL (ref 70–130)
GLUCOSE BLDC GLUCOMTR-MCNC: 222 MG/DL (ref 70–130)
HCT VFR BLD AUTO: 34.2 % (ref 37–47)
HGB BLD-MCNC: 10.7 G/DL (ref 12–16)
IMM GRANULOCYTES # BLD: 0.01 10*3/MM3 (ref 0–0.06)
IMM GRANULOCYTES NFR BLD: 0.2 % (ref 0–0.6)
LYMPHOCYTES # BLD AUTO: 1.35 10*3/MM3 (ref 0.6–3.4)
LYMPHOCYTES NFR BLD AUTO: 21.4 % (ref 10–50)
MCH RBC QN AUTO: 28.6 PG (ref 27–31)
MCHC RBC AUTO-ENTMCNC: 31.3 G/DL (ref 30–37)
MCV RBC AUTO: 91.4 FL (ref 81–99)
MONOCYTES # BLD AUTO: 0.53 10*3/MM3 (ref 0–0.9)
MONOCYTES NFR BLD AUTO: 8.4 % (ref 0–12)
NEUTROPHILS # BLD AUTO: 4.02 10*3/MM3 (ref 2–6.9)
NEUTROPHILS NFR BLD AUTO: 63.8 % (ref 37–80)
NRBC BLD MANUAL-RTO: 0 /100 WBC (ref 0–0)
PLATELET # BLD AUTO: 299 10*3/MM3 (ref 130–400)
PMV BLD AUTO: 9.5 FL (ref 6–12)
POTASSIUM BLD-SCNC: 3.5 MMOL/L (ref 3.5–5.1)
RBC # BLD AUTO: 3.74 10*6/MM3 (ref 4.2–5.4)
SODIUM BLD-SCNC: 144 MMOL/L (ref 137–145)
VANCOMYCIN TROUGH SERPL-MCNC: 14.14 MCG/ML (ref 5–15)
WBC NRBC COR # BLD: 6.3 10*3/MM3 (ref 4.8–10.8)

## 2017-06-10 PROCEDURE — 25010000002 ENOXAPARIN PER 10 MG: Performed by: INTERNAL MEDICINE

## 2017-06-10 PROCEDURE — 94799 UNLISTED PULMONARY SVC/PX: CPT

## 2017-06-10 PROCEDURE — 94660 CPAP INITIATION&MGMT: CPT

## 2017-06-10 PROCEDURE — 99232 SBSQ HOSP IP/OBS MODERATE 35: CPT | Performed by: SURGERY

## 2017-06-10 PROCEDURE — 82962 GLUCOSE BLOOD TEST: CPT

## 2017-06-10 PROCEDURE — 25010000002 PIPERACILLIN SOD-TAZOBACTAM PER 1 G: Performed by: NURSE PRACTITIONER

## 2017-06-10 PROCEDURE — 80048 BASIC METABOLIC PNL TOTAL CA: CPT | Performed by: NURSE PRACTITIONER

## 2017-06-10 PROCEDURE — 99232 SBSQ HOSP IP/OBS MODERATE 35: CPT | Performed by: INTERNAL MEDICINE

## 2017-06-10 PROCEDURE — 63710000001 INSULIN ASPART PER 5 UNITS: Performed by: NURSE PRACTITIONER

## 2017-06-10 PROCEDURE — 80202 ASSAY OF VANCOMYCIN: CPT | Performed by: NURSE PRACTITIONER

## 2017-06-10 PROCEDURE — 85025 COMPLETE CBC W/AUTO DIFF WBC: CPT | Performed by: NURSE PRACTITIONER

## 2017-06-10 PROCEDURE — 25010000002 VANCOMYCIN PER 500 MG: Performed by: NURSE PRACTITIONER

## 2017-06-10 RX ADMIN — ENOXAPARIN SODIUM 40 MG: 40 INJECTION SUBCUTANEOUS at 20:40

## 2017-06-10 RX ADMIN — ATORVASTATIN CALCIUM 20 MG: 20 TABLET, FILM COATED ORAL at 20:40

## 2017-06-10 RX ADMIN — PIPERACILLIN SODIUM AND TAZOBACTAM SODIUM 3.38 G: 3; .375 INJECTION, POWDER, FOR SOLUTION INTRAVENOUS at 17:48

## 2017-06-10 RX ADMIN — CARVEDILOL 25 MG: 25 TABLET, FILM COATED ORAL at 20:40

## 2017-06-10 RX ADMIN — INSULIN ASPART 2 UNITS: 100 INJECTION, SOLUTION INTRAVENOUS; SUBCUTANEOUS at 20:40

## 2017-06-10 RX ADMIN — INSULIN ASPART 2 UNITS: 100 INJECTION, SOLUTION INTRAVENOUS; SUBCUTANEOUS at 17:49

## 2017-06-10 RX ADMIN — OXYBUTYNIN CHLORIDE 5 MG: 5 TABLET, EXTENDED RELEASE ORAL at 08:57

## 2017-06-10 RX ADMIN — CARVEDILOL 25 MG: 25 TABLET, FILM COATED ORAL at 08:56

## 2017-06-10 RX ADMIN — BUPROPION HYDROCHLORIDE 150 MG: 150 TABLET, FILM COATED, EXTENDED RELEASE ORAL at 08:56

## 2017-06-10 RX ADMIN — ACETAMINOPHEN 650 MG: 325 TABLET, FILM COATED ORAL at 20:40

## 2017-06-10 RX ADMIN — PIPERACILLIN SODIUM AND TAZOBACTAM SODIUM 3.38 G: 3; .375 INJECTION, POWDER, FOR SOLUTION INTRAVENOUS at 12:46

## 2017-06-10 RX ADMIN — FAMOTIDINE 20 MG: 20 TABLET, FILM COATED ORAL at 08:57

## 2017-06-10 RX ADMIN — ACETAMINOPHEN 650 MG: 325 TABLET, FILM COATED ORAL at 08:57

## 2017-06-10 RX ADMIN — VANCOMYCIN HYDROCHLORIDE 1750 MG: 500 INJECTION, POWDER, LYOPHILIZED, FOR SOLUTION INTRAVENOUS at 22:00

## 2017-06-10 RX ADMIN — ARIPIPRAZOLE 30 MG: 5 TABLET ORAL at 20:40

## 2017-06-10 RX ADMIN — ENOXAPARIN SODIUM 40 MG: 40 INJECTION SUBCUTANEOUS at 08:57

## 2017-06-10 RX ADMIN — INSULIN ASPART 2 UNITS: 100 INJECTION, SOLUTION INTRAVENOUS; SUBCUTANEOUS at 12:46

## 2017-06-10 RX ADMIN — LITHIUM CARBONATE 450 MG: 450 TABLET, EXTENDED RELEASE ORAL at 20:40

## 2017-06-10 RX ADMIN — DILTIAZEM HYDROCHLORIDE 480 MG: 240 CAPSULE, COATED, EXTENDED RELEASE ORAL at 08:57

## 2017-06-10 RX ADMIN — VANCOMYCIN HYDROCHLORIDE 1500 MG: 500 INJECTION, POWDER, LYOPHILIZED, FOR SOLUTION INTRAVENOUS at 10:37

## 2017-06-10 RX ADMIN — LEVOTHYROXINE SODIUM 150 MCG: 150 TABLET ORAL at 05:35

## 2017-06-10 RX ADMIN — PIPERACILLIN SODIUM AND TAZOBACTAM SODIUM 3.38 G: 3; .375 INJECTION, POWDER, FOR SOLUTION INTRAVENOUS at 05:30

## 2017-06-10 RX ADMIN — Medication 10 ML: at 10:38

## 2017-06-10 NOTE — PLAN OF CARE
Problem: Patient Care Overview (Adult)  Goal: Plan of Care Review  Outcome: Ongoing (interventions implemented as appropriate)    06/10/17 1736   Coping/Psychosocial Response Interventions   Plan Of Care Reviewed With patient   Patient Care Overview   Progress improving         Problem: NPPV/CPAP (Adult)  Goal: Signs and Symptoms of Listed Potential Problems Will be Absent or Manageable (NPPV/CPAP)  Outcome: Ongoing (interventions implemented as appropriate)    06/10/17 1736   NPPV/CPAP   Problems Assessed (NPPV/CPAP) hypoxia/hypoxemia

## 2017-06-10 NOTE — PROGRESS NOTES
[facility]    [facility]    Name:  Caty Garcia   Age:  57 y.o.  Sex:  female  :  1960  MRN:  [id number]   Visit Number:  [id number]  Admission Date:  2017  Date Of Service:  06/10/17  Primary Care Physician:  Jose Katz MD     LOS: 2 days :  Patient Care Team:  Jose Katz MD as PCP - General:    Chief Complaint:      Follow-up of nonhealing left leg ulcer.    Subjective / Interval History:     Patient is currently sitting up on the recliner.  Left lower extremity also dressing has been changed this morning.  I reviewed the picture taken on admission which showed a large anterior ulcer with significant amount of slough.  Patient was admitted a couple of days ago from Dr. Villarreal's office for nonhealing of the ulcer despite being on IV vancomycin at home through the PICC line.  According to the patient, this also has been present since 2017 and she was following up with her primary care physician Dr. Katz and apparently had a course of oral antibiotic therapy for 4 weeks.  Patient was also seen by Dr. Mehta and underwent peripheral angiogram of the left lower extremity and no evidence of peripheral arterial disease was noted.  This was felt to be a venous ulcer.  Patient was also seen by Dr. Wright from surgical services and he did not feel that the patient needed surgical debridement at this time.  Patient was seen by wound care nurse yesterday and is currently getting daily dressings.    Review of Systems:     General ROS: Patient denies any fevers, chills or loss of consciousness.  Respiratory ROS: Denies cough or shortness of breath.  Cardiovascular ROS: Denies chest pain or palpitations. No history of exertional chest pain.  Gastrointestinal ROS: Denies nausea and vomiting. Denies any abdominal pain. No diarrhea.  Neurological ROS: Denies any focal weakness. No loss of consciousness. Denies any numbness. Denies neck pain.  Dermatological  ROS: Denies any redness or pruritis.    Vital Signs:    Temp:  [97.4 °F (36.3 °C)-98.3 °F (36.8 °C)] 97.4 °F (36.3 °C)  Heart Rate:  [55-65] 55  Resp:  [18] 18  BP: (117-152)/(64-83) 117/64    Intake and output:    I/O last 3 completed shifts:  In: 2320 [P.O.:720; IV Piggyback:1600]  Out: 3100 [Urine:3100]  I/O this shift:  In: 480 [P.O.:480]  Out: 1000 [Urine:1000]    Physical Examination:    General Appearance:  Alert and cooperative, not in any acute distress.   Head:  Atraumatic and normocephalic, without obvious abnormality.   Eyes:          PERRLA, conjunctivae and sclerae normal, no Icterus. No pallor. Extra-occular movements are within normal limits.   Neck: Supple, trachea midline, no thyromegaly, no carotid bruit.   Lungs:   Chest shape is normal. Breath sounds heard bilaterally equally.  No crackles or wheezing. No Pleural rub or bronchial breathing.   Heart:  Normal S1 and S2, no murmur, no gallop, no rub. No JVD   Abdomen:   Normal bowel sounds, no masses, no organomegaly. Soft       non-tender, non-distended, no guarding, no rebound tenderness.   Extremities: Moves all extremities well, 3+ edema, no cyanosis, no            clubbing.  Surgical bandage is noted on the left leg.  Patient does have a large superficial ulcer with necrotic slough and mucopurulent discharge in the anterior aspect of her mid leg.  A PICC line is noted on the right arm.     Skin: No bleeding, bruising or rash.   Neurologic: Awake, alert and oriented times 3. Moves all 4 extremities equally.   Laboratory results:      Results from last 7 days  Lab Units 06/10/17  0541 06/09/17  0607 06/08/17  1655   SODIUM mmol/L 144 145 146*   POTASSIUM mmol/L 3.5 3.6 3.9   CHLORIDE mmol/L 105 104 103   TOTAL CO2 mmol/L 30.0 32.0* 30.0   BUN mg/dL 15 20 25*   CREATININE mg/dL 1.20 1.20 1.60*   CALCIUM mg/dL 8.8 9.1 9.4   BILIRUBIN mg/dL  --   --  0.4   ALK PHOS U/L  --   --  105   ALT (SGPT) U/L  --   --  67   AST (SGOT) U/L  --   --  50*    GLUCOSE mg/dL 122* 105* 162*       Results from last 7 days  Lab Units 06/10/17  0541 06/09/17  0607 06/08/17  1655   WBC 10*3/mm3 6.30 8.23 9.41   HEMOGLOBIN g/dL 10.7* 11.0* 12.2   HEMATOCRIT % 34.2* 34.9* 38.7   PLATELETS 10*3/mm3 299 306 346     I have reviewed the patient's laboratory results.    Radiology results:    Imaging Results (last 24 hours)     Procedure Component Value Units Date/Time    US Venous Doppler Lower Extremity Left (duplex) [[id number]] Collected:  06/10/17 0914     Updated:  06/10/17 0916    Narrative:       PROCEDURE: US VENOUS DOPPLER LOWER EXTREMITY LEFT (DUPLEX)-     HISTORY: LLE edema     PROCEDURE: Multiple transverse and longitudinal scans were performed of  the femoropopliteal deep venous system, with augmentation and  compression maneuvers.     FINDINGS: Normal phasic flow was noted in the visualized deep venous  system. No intraluminal increased echogenicity is noted to suggest  thrombus. There is normal compression and augmentation of the venous  structures. No abnormal venous collaterals are seen. No venous reflux is  demonstrated.       Impression:       No evidence of deep venous thrombosis.     This report was finalized on 6/10/2017 9:14 AM by Freddy Marquez M.D..        I have reviewed the patient's radiology reports.    Medication Review:     I have reviewed the patients active and prn medications.     Active Problems:    Cellulitis    Assessment:    1.  Left lower extremity nonhealing venous ulcer, present on admission.  2.  Left lower extremity cellulitis secondary to MRSA, present on admission.  3.  Chronic venous insufficiency of the lower extremities.  4.  Diabetes mellitus type 2 with nephropathy.  5.  Chronic kidney disease stage III.  6.  Obstructive sleep apnea on home CPAP therapy.  7.  Anemia of chronic disease.  8.  Morbid obesity with a BMI of 62.    Plan:    Patient is currently on IV antibiotic therapy with vancomycin and Zosyn.  Despite about 10 days  of home IV antibiotic therapy with vancomycin, she has not improved.  Wound cultures are growing MRSA but I will keep the Zosyn until evaluation by infectious disease services on Monday.  She is being followed by Dr. Wright who does not feel that the patient needs surgical debridement.  Patient's also has been nonhealing since February 2017 and could be related to her venous stasis and significant edema.  I doubt if wound VAC system will help this ulcer healing especially since she does not seem to be having significant drainage.  Further recommendations depend upon her wound status and improvement.    Raji Almodovar MD  06/10/17  3:01 PM    Please note that portions of this note may have been completed with a voice recognition program. Efforts were made to edit the dictations, but occasionally words are mistranscribed.

## 2017-06-10 NOTE — PLAN OF CARE
Problem: Patient Care Overview (Adult)  Goal: Plan of Care Review  Outcome: Ongoing (interventions implemented as appropriate)    06/10/17 1028   Coping/Psychosocial Response Interventions   Plan Of Care Reviewed With patient   Patient Care Overview   Progress improving   Outcome Evaluation   Outcome Summary/Follow up Plan patient is tolerating antibiotic therapy; and daily dressing changes         Problem: Cellulitis (Adult)  Goal: Signs and Symptoms of Listed Potential Problems Will be Absent or Manageable (Cellulitis)  Outcome: Outcome(s) achieved Date Met:  06/10/17    Problem: Fall Risk (Adult)  Goal: Absence of Falls  Outcome: Ongoing (interventions implemented as appropriate)    Problem: Infection, Risk/Actual (Adult)  Goal: Infection Prevention/Resolution  Outcome: Ongoing (interventions implemented as appropriate)    Problem: Skin Integrity Impairment, Risk/Actual (Adult)  Goal: Skin Integrity/Wound Healing  Outcome: Ongoing (interventions implemented as appropriate)

## 2017-06-10 NOTE — PLAN OF CARE
Problem: Patient Care Overview (Adult)  Goal: Plan of Care Review  Outcome: Ongoing (interventions implemented as appropriate)    06/09/17 2030   Coping/Psychosocial Response Interventions   Plan Of Care Reviewed With patient   Patient Care Overview   Progress improving   Outcome Evaluation   Outcome Summary/Follow up Plan patient has minimal complaint of pain-wound care done per physician's orders-will continue to monitor       Goal: Adult Individualization and Mutuality  Outcome: Ongoing (interventions implemented as appropriate)  Goal: Discharge Needs Assessment  Outcome: Ongoing (interventions implemented as appropriate)    Problem: Cellulitis (Adult)  Goal: Signs and Symptoms of Listed Potential Problems Will be Absent or Manageable (Cellulitis)  Outcome: Ongoing (interventions implemented as appropriate)    Problem: Fall Risk (Adult)  Goal: Absence of Falls  Outcome: Ongoing (interventions implemented as appropriate)    Problem: Infection, Risk/Actual (Adult)  Goal: Infection Prevention/Resolution  Outcome: Ongoing (interventions implemented as appropriate)    Problem: Skin Integrity Impairment, Risk/Actual (Adult)  Goal: Identify Related Risk Factors and Signs and Symptoms  Outcome: Ongoing (interventions implemented as appropriate)  Goal: Skin Integrity/Wound Healing  Outcome: Ongoing (interventions implemented as appropriate)

## 2017-06-10 NOTE — PHARMACY RECOMMENDATION
"Pharmacokinetic Follow-up Note - Vancomycin     Caty Garcia is a 57 y.o. female  67.5\" (171.5 cm) (!) 401 lb 12.8 oz (182 kg)     Indication for use: Cellulitis, due to MRSA      Results from last 7 days  Lab Units 06/10/17  0541 06/09/17  0607 06/08/17  1655   WBC 10*3/mm3 6.30 8.23 9.41   CREATININE mg/dL 1.20 1.20 1.60*      Estimated Creatinine Clearance: 89.8 mL/min (by C-G formula based on Cr of 1.2).  Temp Readings from Last 1 Encounters:   06/10/17 97.4 °F (36.3 °C) (Oral)     Lab Results   Component Value Date    [facility] 14.14 06/10/2017       Microbiology:  Microbiology Results (last 10 days)     Procedure Component Value - Date/Time    Gram Stain [[id number]] Collected:  06/09/17 1303    Lab Status:  Final result Specimen:  Swab from Leg, Left Updated:  06/09/17 1611     Gram Stain Result No WBCs seen      No organisms seen          Current Vancomycin Dose:  1500 mg IV q 12 hrs    Other Antimicrobials: Piperacillin/Tazobactam (Zosyn) 3.375 gm IV q 6 hrs      Assessment/Plan:  Increased dose to 1750 mg to obtain troughs in the 15-20 mcg/mL range for MRSA.  Pharmacy will continue to monitor renal function and adjust dose accordingly.    Oscar Henderson HALI   06/10/17 4:32 PM  "

## 2017-06-10 NOTE — PROGRESS NOTES
LOS: 2 days   Patient Care Team:  Jose Katz MD as PCP - General        Subjective  patient with old change, ultrasound negative for DVT.  Cellulitis improving, no weeping from incision.  Patient with minimal pain and discomfort.  Still has significant edema.    Interval History:     Patient Complaints: none    History taken from: patient    Vital Signs  Temp:  [97.6 °F (36.4 °C)-98.3 °F (36.8 °C)] 97.6 °F (36.4 °C)  Heart Rate:  [59-69] 62  Resp:  [18-20] 18  BP: (142-152)/(72-83) 151/83    Physical Exam:     General Appearance:    Alert, cooperative, in no acute distress   Head:    Normocephalic, without obvious abnormality, atraumatic   Lungs:     Clear to auscultation,respirations regular, even and                  unlabored    Heart:    Regular rhythm and normal rate, normal S1 and S2, no            murmur, no gallop, no rub, no click   Abdomen:     Normal bowel sounds, no masses, no organomegaly, soft        non-tender, non-distended, no guarding, no rebound                tenderness   Extremities:   Moves all extremities well, no edema, no cyanosis, no             redness   Pulses:   Pulses palpable and equal bilaterally   Skin:   No bleeding, bruising or rash        Results Review:       Lab Results (last 24 hours)     Procedure Component Value Units Date/Time    POC Glucose Fingerstick [[id number]]  (Abnormal) Collected:  06/09/17 1109    Specimen:  Blood Updated:  06/09/17 1158     Glucose 137 (H) mg/dL       Serial Number: [id number]    : [id number]       Wound Culture [[id number]] Collected:  06/09/17 1303    Specimen:  Wound from Leg, Left Updated:  06/09/17 1317    Anaerobic Culture [[id number]] Collected:  06/09/17 1303    Specimen:  Swab from Leg, Left Updated:  06/09/17 1317    Gram Stain [[id number]] Collected:  06/09/17 1303    Specimen:  Swab from Leg, Left Updated:  06/09/17 1611     Gram Stain Result No WBCs seen      No organisms seen    POC Glucose Fingerstick [[id number]]  (Abnormal)  Collected:  06/09/17 2017    Specimen:  Blood Updated:  06/10/17 0351     Glucose 222 (H) mg/dL       Serial Number: [id number]    : [id number]       CBC & Differential [[id number]] Collected:  06/10/17 0541    Specimen:  Blood Updated:  06/10/17 0626    Narrative:       The following orders were created for panel order CBC & Differential.  Procedure                               Abnormality         Status                     ---------                               -----------         ------                     CBC Auto Differential[[id number]]        Abnormal            Final result                 Please view results for these tests on the individual orders.    CBC Auto Differential [[id number]]  (Abnormal) Collected:  06/10/17 0541    Specimen:  Blood Updated:  06/10/17 0626     WBC 6.30 10*3/mm3      RBC 3.74 (L) 10*6/mm3      Hemoglobin 10.7 (L) g/dL      Hematocrit 34.2 (L) %      MCV 91.4 fL      MCH 28.6 pg      MCHC 31.3 g/dL      RDW 14.0 %      RDW-SD 46.7 fl      MPV 9.5 fL      Platelets 299 10*3/mm3      Neutrophil % 63.8 %      Lymphocyte % 21.4 %      Monocyte % 8.4 %      Eosinophil % 5.4 %      Basophil % 0.8 %      Immature Grans % 0.2 %      Neutrophils, Absolute 4.02 10*3/mm3      Lymphocytes, Absolute 1.35 10*3/mm3      Monocytes, Absolute 0.53 10*3/mm3      Eosinophils, Absolute 0.34 10*3/mm3      Basophils, Absolute 0.05 10*3/mm3      Immature Grans, Absolute 0.01 10*3/mm3      nRBC 0.0 /100 WBC     POC Glucose Fingerstick [[id number]]  (Normal) Collected:  06/10/17 0633    Specimen:  Blood Updated:  06/10/17 0636     Glucose 119 mg/dL       Serial Number: [id number]    : [id number]       Basic Metabolic Panel [[id number]]  (Abnormal) Collected:  06/10/17 0541    Specimen:  Blood Updated:  06/10/17 0642     Glucose 122 (H) mg/dL      BUN 15 mg/dL      Creatinine 1.20 mg/dL      Sodium 144 mmol/L      Potassium 3.5 mmol/L      Chloride 105 mmol/L      CO2 30.0 mmol/L      Calcium 8.8 mg/dL       eGFR Non African Amer 46 (L) mL/min/1.73      BUN/Creatinine Ratio 12.5     Anion Gap 12.5 mmol/L     Narrative:       Abnormal estimated GFR should be followed by more specific studies to confirm end stage chronic renal disease. The equation used for calculation may not be accurate for patients less than 19 years old, greater than 70 years old, patients at extremes of weight, malnutrition, or with acute renal dysfunction.              Assessment/Plan     Active Problems:    Cellulitis      Much improved, edema still a problem.  Patient will require Ace wrap bandage for compression stockings long-term to allow healing of the venous stasis ulcer.      Real Wright MD  06/10/17  8:17 AM

## 2017-06-11 LAB
GLUCOSE BLDC GLUCOMTR-MCNC: 111 MG/DL (ref 70–130)
GLUCOSE BLDC GLUCOMTR-MCNC: 168 MG/DL (ref 70–130)

## 2017-06-11 PROCEDURE — G8978 MOBILITY CURRENT STATUS: HCPCS

## 2017-06-11 PROCEDURE — 82962 GLUCOSE BLOOD TEST: CPT

## 2017-06-11 PROCEDURE — 63710000001 INSULIN ASPART PER 5 UNITS: Performed by: NURSE PRACTITIONER

## 2017-06-11 PROCEDURE — 99232 SBSQ HOSP IP/OBS MODERATE 35: CPT | Performed by: SURGERY

## 2017-06-11 PROCEDURE — 25010000002 VANCOMYCIN PER 500 MG: Performed by: NURSE PRACTITIONER

## 2017-06-11 PROCEDURE — G8980 MOBILITY D/C STATUS: HCPCS

## 2017-06-11 PROCEDURE — G8979 MOBILITY GOAL STATUS: HCPCS

## 2017-06-11 PROCEDURE — 99232 SBSQ HOSP IP/OBS MODERATE 35: CPT | Performed by: INTERNAL MEDICINE

## 2017-06-11 PROCEDURE — 25010000002 ENOXAPARIN PER 10 MG: Performed by: INTERNAL MEDICINE

## 2017-06-11 PROCEDURE — 25010000002 PIPERACILLIN SOD-TAZOBACTAM PER 1 G: Performed by: NURSE PRACTITIONER

## 2017-06-11 PROCEDURE — 97161 PT EVAL LOW COMPLEX 20 MIN: CPT

## 2017-06-11 RX ORDER — ERGOCALCIFEROL 1.25 MG/1
50000 CAPSULE ORAL
Status: DISCONTINUED | OUTPATIENT
Start: 2017-06-11 | End: 2017-06-14 | Stop reason: HOSPADM

## 2017-06-11 RX ADMIN — LITHIUM CARBONATE 450 MG: 450 TABLET, EXTENDED RELEASE ORAL at 21:15

## 2017-06-11 RX ADMIN — ATORVASTATIN CALCIUM 20 MG: 20 TABLET, FILM COATED ORAL at 21:15

## 2017-06-11 RX ADMIN — VANCOMYCIN HYDROCHLORIDE 1750 MG: 500 INJECTION, POWDER, LYOPHILIZED, FOR SOLUTION INTRAVENOUS at 09:18

## 2017-06-11 RX ADMIN — ERGOCALCIFEROL 50000 UNITS: 1.25 CAPSULE ORAL at 21:35

## 2017-06-11 RX ADMIN — ENOXAPARIN SODIUM 40 MG: 40 INJECTION SUBCUTANEOUS at 21:15

## 2017-06-11 RX ADMIN — FAMOTIDINE 20 MG: 20 TABLET, FILM COATED ORAL at 09:11

## 2017-06-11 RX ADMIN — OXYBUTYNIN CHLORIDE 5 MG: 5 TABLET, EXTENDED RELEASE ORAL at 09:11

## 2017-06-11 RX ADMIN — ACETAMINOPHEN 650 MG: 325 TABLET, FILM COATED ORAL at 09:11

## 2017-06-11 RX ADMIN — PIPERACILLIN SODIUM AND TAZOBACTAM SODIUM 3.38 G: 3; .375 INJECTION, POWDER, FOR SOLUTION INTRAVENOUS at 06:45

## 2017-06-11 RX ADMIN — VANCOMYCIN HYDROCHLORIDE 1750 MG: 500 INJECTION, POWDER, LYOPHILIZED, FOR SOLUTION INTRAVENOUS at 22:00

## 2017-06-11 RX ADMIN — ACETAMINOPHEN 650 MG: 325 TABLET, FILM COATED ORAL at 21:15

## 2017-06-11 RX ADMIN — DILTIAZEM HYDROCHLORIDE 480 MG: 240 CAPSULE, COATED, EXTENDED RELEASE ORAL at 09:11

## 2017-06-11 RX ADMIN — BUPROPION HYDROCHLORIDE 150 MG: 150 TABLET, FILM COATED, EXTENDED RELEASE ORAL at 09:11

## 2017-06-11 RX ADMIN — LEVOTHYROXINE SODIUM 75 MCG: 150 TABLET ORAL at 06:40

## 2017-06-11 RX ADMIN — INSULIN ASPART 6 UNITS: 100 INJECTION, SOLUTION INTRAVENOUS; SUBCUTANEOUS at 11:15

## 2017-06-11 RX ADMIN — ENOXAPARIN SODIUM 40 MG: 40 INJECTION SUBCUTANEOUS at 09:10

## 2017-06-11 RX ADMIN — CARVEDILOL 25 MG: 25 TABLET, FILM COATED ORAL at 21:35

## 2017-06-11 RX ADMIN — Medication 10 ML: at 22:00

## 2017-06-11 RX ADMIN — ACETAMINOPHEN 650 MG: 325 TABLET, FILM COATED ORAL at 17:09

## 2017-06-11 RX ADMIN — CARVEDILOL 25 MG: 25 TABLET, FILM COATED ORAL at 09:11

## 2017-06-11 RX ADMIN — ARIPIPRAZOLE 30 MG: 5 TABLET ORAL at 21:15

## 2017-06-11 RX ADMIN — PIPERACILLIN SODIUM AND TAZOBACTAM SODIUM 3.38 G: 3; .375 INJECTION, POWDER, FOR SOLUTION INTRAVENOUS at 01:00

## 2017-06-11 NOTE — THERAPY EVALUATION
Acute Care - Physical Therapy Initial Evaluation  [facility]     Patient Name: Caty Garcia  : 1960  MRN: [id number]  Today's Date: 2017   Onset of Illness/Injury or Date of Surgery Date: 17  Date of Referral to PT: 06/10/17  Referring Physician: Thalia       Admit Date: 2017     Visit Dx:  No diagnosis found.  Patient Active Problem List   Diagnosis   • Rash   • Back pain   • Mood changes   • Mood disorder   • Acute renal failure   • Cellulitis of left lower extremity   • Chronic venous insufficiency   • Essential hypertension   • Acquired hypothyroidism   • Dyslipidemia   • Type 2 diabetes mellitus treated without insulin   • Venous stasis dermatitis of both lower extremities   • Overactive bladder   • GERD with esophagitis   • Cellulitis   • Morbid obesity with BMI of 60.0-69.9, adult     Past Medical History:   Diagnosis Date   • Arthritis    • Bipolar 1 disorder    • Diabetes mellitus    • Disease of thyroid gland    • High blood pressure    • High cholesterol    • Migraine    • Sleep apnea      Past Surgical History:   Procedure Laterality Date   • CHOLECYSTECTOMY     • COLONOSCOPY     • GALLBLADDER SURGERY     • GASTRIC SLEEVE LAPAROSCOPIC     • INTERVENTIONAL RADIOLOGY PROCEDURE N/A 2017    Procedure: Abdominal Aortagram with Runoff;  Surgeon: Otf Mehta MD;  Location: [facility] INVASIVE LOC[facility];  Service:    • TOTAL HIP ARTHROPLASTY Right    • TOTAL HIP ARTHROPLASTY Left           PT ASSESSMENT (last 72 hours)      PT Evaluation       17 1030 06/10/17 2040    Rehab Evaluation    Document Type evaluation  -BS     Subjective Information agree to therapy;complains of;pain   Left LE.   -BS     General Information    Patient Profile Review yes  -BS     Onset of Illness/Injury or Date of Surgery Date 17  -BS     Referring Physician Thalia EspinosaBS     General Observations Patient with venous ulcer, MRSA infection, left LE. Bed Bug infestation of her  lliving environment.   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   -BS     Precautions/Limitations no known precautions/limitations  -BS     Prior Level of Function independent:  -BS     Equipment Currently Used at Home  walker, standard;shower chair  -DW    Plans/Goals Discussed With patient;agreed upon   Medical team is seeking placement for wound management.   -BS     Risks Reviewed patient:;nausea/vomiting;dizziness;increased discomfort;change in vital signs;increased drainage  -BS     Benefits Reviewed patient:;improve function;increase independence;increase strength;increase balance;decrease pain  -BS     Barriers to Rehab none identified  -BS     Living Environment    Lives With alone  -BS     Living Arrangements apartment  -BS     Home Accessibility no concerns  -BS     Transportation Available  family or friend will provide;car  -DW    Living Environment Comment Currently being treated for bed bug infestation.   -BS     Clinical Impression    Date of Referral to PT 06/10/17  -BS     PT Diagnosis Patient is functioning at her baseline level of function.   -BS     Prognosis Good   -BS     Functional Level At Time Of Evaluation Independent   -BS     Criteria for Skilled Therapeutic Interventions Met no;no problems identified which require skilled intervention  -BS     Pathology/Pathophysiology Noted (Describe Specifically for Each System) integumentary;endocrine/metabolic  -BS     Impairments Found (describe specific impairments) integumentary integrity  -BS     Bed Mobility, Assessment/Treatment    Bed Mob, Supine to Sit, [place] independent  -BS     Transfer Assessment/Treatment    Transfers, Bed-Chair [place] independent  -BS     Gait Assessment/Treatment    Gait, [place] Level independent  -BS     Positioning and Restraints    Pre-Treatment Position sitting in chair/recliner  -BS     Post Treatment Position  bed  -BS     In Bed notified nsg;supine;call light within reach;encouraged to call for assist;LLE elevated  -BS       06/09/17 2030 06/08/17 1916    General Information    Equipment Currently Used at Home walker, standard;shower chair  -DW walker, standard;shower chair  -LB    Living Environment    Transportation Available family or friend will provide;car  -DW     Living Environment Comment  pt states a friend drives her to appointments or when she is able uses the city bus  -LB      06/08/17 1706       General Information    Equipment Currently Used at Home glucometer;cane, straight  -SP     Living Environment    Lives With alone  -SP     Living Arrangements apartment  -SP     Home Accessibility other (see comments)   bedbugs active on patient's clothing; apt recently sprayed.  -SP     Stair Railings at Home none  -SP     Type of Financial/Environmental Concern none  -SP     Transportation Available family or friend will provide  -SP       User Key  (r) = Recorded By, (t) = Taken By, (c) = Cosigned By    Initials Name Provider Type    SP Carlo Ivory, RN Registered Nurse     Kendra Stokes, RN Registered Nurse    JOSÉ Hawthorne, PT Physical Therapist    LB Michelle Hernandez, RN Registered Nurse          Physical Therapy Education     Title: PT OT SLP Therapies (Done)     Topic: Physical Therapy (Done)     Point: Mobility training (Done)    Learning Progress Summary    Learner Readiness Method Response Comment Documented by Status   Patient Acceptance E VU Patient demonstrated no skilled inpatient PT needs at this time.  06/11/17 1345 Done               Point: Home exercise program (Done)    Learning Progress Summary    Learner Readiness Method Response Comment Documented by Status   Patient Acceptance E VU Patient demonstrated no skilled inpatient PT needs at this time.  06/11/17 1345 Done               Point: Body mechanics (Done)    Learning Progress Summary    Learner Readiness Method Response Comment  Documented by Status   Patient Acceptance E VU Patient demonstrated no skilled inpatient PT needs at this time. BS 06/11/17 1345 Done               Point: Precautions (Done)    Learning Progress Summary    Learner Readiness Method Response Comment Documented by Status   Patient Acceptance E VU Patient demonstrated no skilled inpatient PT needs at this time.  06/11/17 1345 Done                      User Key     Initials Effective Dates Name Provider Type Discipline    BS 10/26/16 -  Colby Hawthorne PT Physical Therapist PT                PT Recommendation and Plan  Anticipated Discharge Disposition:  (Placement rec's deferred to nursing. )  Demonstrates Need for Referral to Another Service:  (Wound management. )  Plan of Care Review  Plan Of Care Reviewed With: patient  Progress: progress toward functional goals as expected  Outcome Summary/Follow up Plan: PT eval complete and patient is functioning at her baseline level of function.               Outcome Measures       06/11/17 1030          How much help from another person do you currently need...    Turning from your back to your side while in flat bed without using bedrails? 4  -BS      Moving from lying on back to sitting on the side of a flat bed without bedrails? 4  -BS      Moving to and from a bed to a chair (including a wheelchair)? 4  -BS      Standing up from a chair using your arms (e.g., wheelchair, bedside chair)? 4  -BS      Climbing 3-5 steps with a railing? 4  -BS      To walk in hospital room? 4  -BS      AM-PAC 6 Clicks Score 24  -BS      Functional Assessment    Outcome Measure Options AM-PAC 6 Clicks Basic Mobility (PT)  -BS        User Key  (r) = Recorded By, (t) = Taken By, (c) = Cosigned By    Initials Name Provider Type    JOSÉ Hawthorne PT Physical Therapist           Time Calculation:         PT Charges       06/11/17 1348          Time Calculation    Stop Time 1030  -BS      PT Received On 06/11/17  -BS        User Key  (r) =  Recorded By, (t) = Taken By, (c) = Cosigned By    Initials Name Provider Type    BS Colby Hawthorne PT Physical Therapist          Therapy Charges for Today     Code Description Service Date Service Provider Modifiers Qty    67731456372 HC PT MOBILITY CURRENT 6/11/2017 Colby Hawthorne PT GP, CH 1    35128482325 HC PT MOBILITY PROJECTED 6/11/2017 Colby Hawthorne PT GP, CH 1    47475342290 HC PT MOBILITY DISCHARGE 6/11/2017 Colby Hawthorne PT GP, CH 1    42394771816 HC PT EVAL LOW COMPLEXITY 4 6/11/2017 Colby Hawthorne PT GP, KX 1          PT G-Codes  PT Professional Judgement Used?: Yes  Outcome Measure Options: AM-PAC 6 Clicks Basic Mobility (PT)  Score: 24  Functional Limitation: Mobility: Walking and moving around  Mobility: Walking and Moving Around Current Status (): 0 percent impaired, limited or restricted  Mobility: Walking and Moving Around Goal Status (): 0 percent impaired, limited or restricted  Mobility: Walking and Moving Around Discharge Status (): 0 percent impaired, limited or restricted      Colby Hawthorne PT  6/11/2017

## 2017-06-11 NOTE — PLAN OF CARE
Problem: Patient Care Overview (Adult)  Goal: Plan of Care Review  Outcome: Ongoing (interventions implemented as appropriate)    06/11/17 1346   Coping/Psychosocial Response Interventions   Plan Of Care Reviewed With patient   Patient Care Overview   Progress progress toward functional goals as expected   Outcome Evaluation   Outcome Summary/Follow up Plan PT eval complete and patient is functioning at her baseline level of function.

## 2017-06-11 NOTE — PLAN OF CARE
Problem: Patient Care Overview (Adult)  Goal: Plan of Care Review  Outcome: Ongoing (interventions implemented as appropriate)    06/10/17 2040   Coping/Psychosocial Response Interventions   Plan Of Care Reviewed With patient   Patient Care Overview   Progress improving   Outcome Evaluation   Outcome Summary/Follow up Plan patient resting in bedside recliner-minimal complaint of headache-meds given per physician's orders-blood sugar coverage per protocol-will continue to monitor       Goal: Adult Individualization and Mutuality  Outcome: Ongoing (interventions implemented as appropriate)  Goal: Discharge Needs Assessment  Outcome: Ongoing (interventions implemented as appropriate)    Problem: Fall Risk (Adult)  Goal: Absence of Falls  Outcome: Ongoing (interventions implemented as appropriate)    Problem: Infection, Risk/Actual (Adult)  Goal: Infection Prevention/Resolution  Outcome: Ongoing (interventions implemented as appropriate)    Problem: Skin Integrity Impairment, Risk/Actual (Adult)  Goal: Identify Related Risk Factors and Signs and Symptoms  Outcome: Ongoing (interventions implemented as appropriate)  Goal: Skin Integrity/Wound Healing  Outcome: Ongoing (interventions implemented as appropriate)

## 2017-06-11 NOTE — PROGRESS NOTES
[facility]    Name:  Caty Garcia   Age:  57 y.o.  Sex:  female  :  1960  MRN:  [id number]   Visit Number:  [id number]  Admission Date:  2017  Date Of Service:  17  Primary Care Physician:  Jose Katz MD     LOS: 3 days :  Patient Care Team:  Jose Katz MD as PCP - General:    Chief Complaint:      Follow-up of nonhealing left leg ulcer.    Subjective / Interval History:     Patient is currently sitting up on the recliner.  I removed her left leg surgical and age and evaluated the ulcer along with Dr. Wright.  She does have large ulcer in the anterior aspect of her significantly swollen left leg due to venous stasis.  The ulcer is covered by necrotic yellowish slough with serosanguineous discharge.  There is surrounding erythema of the ulcer.  No fevers overnight.    Patient was admitted on 2017 from Dr. Villarreal's office for nonhealing of the ulcer despite being on IV vancomycin at home through the PICC line for about 10 days.  According to the patient, this ulcer has been present since 2017 and she was following up with her primary care physician Dr. Katz and apparently had a course of oral antibiotic therapy for 4 weeks.  Patient was also seen by Dr. Mehta and underwent peripheral angiogram of the left lower extremity and no evidence of peripheral arterial disease was noted.  This was felt to be a venous ulcer.  Patient was seen by Dr. Wright from surgical services and he did not feel that the patient needed surgical debridement at this time.  Patient was seen by wound care nurse and is currently getting daily dressings.    Review of Systems:     General ROS: Patient denies any fevers, chills or loss of consciousness.  Respiratory ROS: Denies cough or shortness of breath.  Cardiovascular ROS: Denies chest pain or palpitations. No history of exertional chest pain.  Gastrointestinal ROS: Denies nausea and vomiting. Denies  any abdominal pain. No diarrhea.  Neurological ROS: Denies any focal weakness. No loss of consciousness. Denies any numbness. Denies neck pain.  Dermatological ROS: Denies any redness or pruritis.    Vital Signs:    Temp:  [97.9 °F (36.6 °C)-98.4 °F (36.9 °C)] 98.4 °F (36.9 °C)  Heart Rate:  [51-70] 62  Resp:  [16-20] 18  BP: (130-160)/(61-85) 140/73    Intake and output:    I/O last 3 completed shifts:  In: 2550 [P.O.:1200; I.V.:350; IV Piggyback:1000]  Out: 3050 [Urine:3050]  I/O this shift:  In: 460 [P.O.:360; IV Piggyback:100]  Out: -     Physical Examination:    General Appearance:  Alert and cooperative, not in any acute distress.   Head:  Atraumatic and normocephalic, without obvious abnormality.   Eyes:          PERRLA, conjunctivae and sclerae normal, no Icterus. No pallor. Extra-occular movements are within normal limits.   Neck: Supple, trachea midline, no thyromegaly, no carotid bruit.   Lungs:   Chest shape is normal. Breath sounds heard bilaterally equally.  No crackles or wheezing. No Pleural rub or bronchial breathing.   Heart:  Normal S1 and S2, no murmur, no gallop, no rub. No JVD   Abdomen:   Normal bowel sounds, no masses, no organomegaly. Soft       non-tender, non-distended, no guarding, no rebound tenderness.   Extremities: Moves all extremities well, 3+ edema, no cyanosis, no            clubbing.  Large ulcer with necrotic yellowish slough noted in the anterior aspect of the left mid leg.  The ulcer has surrounding erythema.  Serosanguineous discharge noted from the ulcer.  A PICC line is noted on the right arm.     Skin: No bleeding or bruising.   Neurologic: Awake, alert and oriented times 3. Moves all 4 extremities equally.   Laboratory results:      Results from last 7 days  Lab Units 06/10/17  0541 06/09/17  0607 06/08/17  1655   SODIUM mmol/L 144 145 146*   POTASSIUM mmol/L 3.5 3.6 3.9   CHLORIDE mmol/L 105 104 103   TOTAL CO2 mmol/L 30.0 32.0* 30.0   BUN mg/dL 15 20 25*   CREATININE  mg/dL 1.20 1.20 1.60*   CALCIUM mg/dL 8.8 9.1 9.4   BILIRUBIN mg/dL  --   --  0.4   ALK PHOS U/L  --   --  105   ALT (SGPT) U/L  --   --  67   AST (SGOT) U/L  --   --  50*   GLUCOSE mg/dL 122* 105* 162*       Results from last 7 days  Lab Units 06/10/17  0541 06/09/17  0607 06/08/17  1655   WBC 10*3/mm3 6.30 8.23 9.41   HEMOGLOBIN g/dL 10.7* 11.0* 12.2   HEMATOCRIT % 34.2* 34.9* 38.7   PLATELETS 10*3/mm3 299 306 346     I have reviewed the patient's laboratory results.    Radiology results:    Imaging Results (last 24 hours)     ** No results found for the last 24 hours. **        Medication Review:     I have reviewed the patients active and prn medications.     Principal Problem:    Cellulitis of left lower extremity  Active Problems:    Chronic venous insufficiency    Type 2 diabetes mellitus treated without insulin    Morbid obesity with BMI of 60.0-69.9, adult    Essential hypertension    Acquired hypothyroidism    Assessment:    1.  Left lower extremity nonhealing venous ulcer, present on admission.  2.  Left lower extremity cellulitis secondary to MRSA, present on admission.  3.  Chronic venous insufficiency of the lower extremities.  4.  Diabetes mellitus type 2 with nephropathy.  5.  Chronic kidney disease stage III.  6.  Obstructive sleep apnea on home CPAP therapy.  7.  Anemia of chronic disease.  8.  Morbid obesity with a BMI of 62.    Plan:    Patient is currently on IV antibiotic therapy with vancomycin and Zosyn.  Despite about 10 days of home IV antibiotic therapy with vancomycin, she has not improved.  Wound cultures are growing MRSA .  I think her main problem is the venous stasis that is impeding the healing of this ulcer.  She does have infection in the wound but would benefit from pressure dressing.  I will discontinue the Zosyn today.  She is being followed by Dr. Wright and I have discussed the patient's condition with him.  She is currently on Lovenox 40 mg twice daily as a DVT prophylaxis  dose.  Unless the swelling in the left lower extremity has improved, this ulcer may not heal.  Venous scan of the lower extremities was negative for DVT.    Raji Almodovar MD  06/11/17  10:49 AM    Please note that portions of this note may have been completed with a voice recognition program. Efforts were made to edit the dictations, but occasionally words are mistranscribed.

## 2017-06-11 NOTE — PHARMACY RECOMMENDATION
" [Ht: 67.5\" (171.5 cm); Wt: (!) 401 lb 9.6 oz (182 kg)]  Body mass index is 61.97 kg/(m^2).  Estimated Creatinine Clearance: 89.8 mL/min (by C-G formula based on Cr of 1.2).    Results from last 7 days  Lab Units 06/10/17  0541 06/09/17  0607 06/08/17  1655   HEMOGLOBIN g/dL 10.7* 11.0* 12.2   HEMATOCRIT % 34.2* 34.9* 38.7   PLATELETS 10*3/mm3 299 306 346     For BMI > 40, recommended to increase Enoxaparin for VTE prophylaxis to 40 mg PO q 12 hrs dosing.    Thank you,    Oscar Henderson, Pharm.D.  06/11/17 10:15 AM  "

## 2017-06-11 NOTE — PLAN OF CARE
Problem: Patient Care Overview (Adult)  Goal: Plan of Care Review  Outcome: Ongoing (interventions implemented as appropriate)    06/11/17 0436   Coping/Psychosocial Response Interventions   Plan Of Care Reviewed With patient   Patient Care Overview   Progress no change         Problem: NPPV/CPAP (Adult)  Goal: Signs and Symptoms of Listed Potential Problems Will be Absent or Manageable (NPPV/CPAP)  Outcome: Ongoing (interventions implemented as appropriate)    06/11/17 0436   NPPV/CPAP   Problems Assessed (NPPV/CPAP) hypoxia/hypoxemia   Problems Present (NPPV/CPAP) hypoxia/hypoxemia

## 2017-06-11 NOTE — PROGRESS NOTES
LOS: 3 days   Patient Care Team:  Jose Katz MD as PCP - General        Subjective  patient with little change, still has significant weeping and swelling of the  left lower extremity.  NEGATIVE FOR DVT.  NO PAIN OR DISCOMFORT.    Interval History:     Patient Complaints: none    History taken from: patient    Vital Signs  Temp:  [97.4 °F (36.3 °C)-98.3 °F (36.8 °C)] 98.1 °F (36.7 °C)  Heart Rate:  [51-70] 54  Resp:  [16-20] 18  BP: (117-160)/(61-85) 133/77    Physical Exam:     General Appearance:    Alert, cooperative, in no acute distress   Head:    Normocephalic, without obvious abnormality, atraumatic   Lungs:     Clear to auscultation,respirations regular, even and                  unlabored    Heart:    Regular rhythm and normal rate, normal S1 and S2, no            murmur, no gallop, no rub, no click   Abdomen:     Normal bowel sounds, no masses, no organomegaly, soft        non-tender, non-distended, no guarding, no rebound                tenderness   Extremities:   Moves all extremities well, no edema, no cyanosis, no             redness   Pulses:   Pulses palpable and equal bilaterally   Skin:   No bleeding, bruising or rash        Results Review:       Lab Results (last 24 hours)     Procedure Component Value Units Date/Time    Vancomycin, Trough [[id number]]  (Normal) Collected:  06/10/17 0909    Specimen:  Blood Updated:  06/10/17 1000     Vancomycin Trough 14.14 mcg/mL     POC Glucose Fingerstick [[id number]]  (Abnormal) Collected:  06/10/17 1056    Specimen:  Blood Updated:  06/10/17 1100     Glucose 176 (H) mg/dL       Serial Number: [id number]    : [id number]       POC Glucose Fingerstick [[id number]]  (Abnormal) Collected:  06/10/17 2012    Specimen:  Blood Updated:  06/10/17 2021     Glucose 169 (H) mg/dL       Serial Number: [id number]    : [id number]       POC Glucose Fingerstick [[id number]]  (Normal) Collected:  06/11/17 0614    Specimen:  Blood Updated:  06/11/17 0630      Glucose 111 mg/dL       Serial Number: [id number]    : [id number]                 Assessment/Plan     Principal Problem:    Cellulitis of left lower extremity  Active Problems:    Chronic venous insufficiency    Essential hypertension    Acquired hypothyroidism    Type 2 diabetes mellitus treated without insulin    Morbid obesity with BMI of 60.0-69.9, adult      No significant change in venous stasis ulcer on the left lower extremity.  We will attempt compression with Ace wrap today.  Will need long-term care with measurement for permanent compression stockings.  Wound not likely to heal as well as edema is significant.  Patient understands that.  Case discussed with hospitalist.      Real Wright MD  06/11/17  8:15 AM

## 2017-06-12 LAB
ANION GAP SERPL CALCULATED.3IONS-SCNC: 11.3 MMOL/L
BACTERIA SPEC AEROBE CULT: ABNORMAL
BACTERIA SPEC AEROBE CULT: ABNORMAL
BASOPHILS # BLD AUTO: 0.05 10*3/MM3 (ref 0–0.2)
BASOPHILS NFR BLD AUTO: 0.9 % (ref 0–2.5)
BUN BLD-MCNC: 9 MG/DL (ref 7–20)
BUN/CREAT SERPL: 6.9 (ref 7.1–23.5)
CALCIUM SPEC-SCNC: 8.8 MG/DL (ref 8.4–10.2)
CHLORIDE SERPL-SCNC: 107 MMOL/L (ref 98–107)
CO2 SERPL-SCNC: 31 MMOL/L (ref 26–30)
CREAT BLD-MCNC: 1.3 MG/DL (ref 0.6–1.3)
DEPRECATED RDW RBC AUTO: 46.1 FL (ref 37–54)
EOSINOPHIL # BLD AUTO: 0.35 10*3/MM3 (ref 0–0.7)
EOSINOPHIL NFR BLD AUTO: 6.6 % (ref 0–7)
ERYTHROCYTE [DISTWIDTH] IN BLOOD BY AUTOMATED COUNT: 13.6 % (ref 11.5–14.5)
GFR SERPL CREATININE-BSD FRML MDRD: 42 ML/MIN/1.73
GLUCOSE BLD-MCNC: 132 MG/DL (ref 74–98)
GLUCOSE BLDC GLUCOMTR-MCNC: 109 MG/DL (ref 70–130)
GLUCOSE BLDC GLUCOMTR-MCNC: 111 MG/DL (ref 70–130)
GLUCOSE BLDC GLUCOMTR-MCNC: 114 MG/DL (ref 70–130)
GLUCOSE BLDC GLUCOMTR-MCNC: 138 MG/DL (ref 70–130)
GLUCOSE BLDC GLUCOMTR-MCNC: 209 MG/DL (ref 70–130)
GLUCOSE BLDC GLUCOMTR-MCNC: 270 MG/DL (ref 70–130)
HCT VFR BLD AUTO: 33.6 % (ref 37–47)
HGB BLD-MCNC: 10.6 G/DL (ref 12–16)
IMM GRANULOCYTES # BLD: 0.04 10*3/MM3 (ref 0–0.06)
IMM GRANULOCYTES NFR BLD: 0.8 % (ref 0–0.6)
LYMPHOCYTES # BLD AUTO: 1.17 10*3/MM3 (ref 0.6–3.4)
LYMPHOCYTES NFR BLD AUTO: 22 % (ref 10–50)
MCH RBC QN AUTO: 28.9 PG (ref 27–31)
MCHC RBC AUTO-ENTMCNC: 31.5 G/DL (ref 30–37)
MCV RBC AUTO: 91.6 FL (ref 81–99)
MONOCYTES # BLD AUTO: 0.59 10*3/MM3 (ref 0–0.9)
MONOCYTES NFR BLD AUTO: 11.1 % (ref 0–12)
NEUTROPHILS # BLD AUTO: 3.13 10*3/MM3 (ref 2–6.9)
NEUTROPHILS NFR BLD AUTO: 58.6 % (ref 37–80)
NRBC BLD MANUAL-RTO: 0 /100 WBC (ref 0–0)
PLATELET # BLD AUTO: 253 10*3/MM3 (ref 130–400)
PMV BLD AUTO: 9.4 FL (ref 6–12)
POTASSIUM BLD-SCNC: 3.3 MMOL/L (ref 3.5–5.1)
RBC # BLD AUTO: 3.67 10*6/MM3 (ref 4.2–5.4)
SODIUM BLD-SCNC: 146 MMOL/L (ref 137–145)
VANCOMYCIN TROUGH SERPL-MCNC: 17.98 MCG/ML (ref 5–15)
WBC NRBC COR # BLD: 5.33 10*3/MM3 (ref 4.8–10.8)

## 2017-06-12 PROCEDURE — 25010000002 CEFTRIAXONE: Performed by: NURSE PRACTITIONER

## 2017-06-12 PROCEDURE — 82962 GLUCOSE BLOOD TEST: CPT

## 2017-06-12 PROCEDURE — 99232 SBSQ HOSP IP/OBS MODERATE 35: CPT | Performed by: NURSE PRACTITIONER

## 2017-06-12 PROCEDURE — 85025 COMPLETE CBC W/AUTO DIFF WBC: CPT | Performed by: INTERNAL MEDICINE

## 2017-06-12 PROCEDURE — 80048 BASIC METABOLIC PNL TOTAL CA: CPT | Performed by: INTERNAL MEDICINE

## 2017-06-12 PROCEDURE — 25010000002 ENOXAPARIN PER 10 MG: Performed by: INTERNAL MEDICINE

## 2017-06-12 PROCEDURE — 97165 OT EVAL LOW COMPLEX 30 MIN: CPT

## 2017-06-12 PROCEDURE — 80202 ASSAY OF VANCOMYCIN: CPT | Performed by: NURSE PRACTITIONER

## 2017-06-12 PROCEDURE — 63710000001 INSULIN ASPART PER 5 UNITS: Performed by: NURSE PRACTITIONER

## 2017-06-12 PROCEDURE — 99232 SBSQ HOSP IP/OBS MODERATE 35: CPT | Performed by: SURGERY

## 2017-06-12 PROCEDURE — 94660 CPAP INITIATION&MGMT: CPT

## 2017-06-12 RX ADMIN — ARIPIPRAZOLE 30 MG: 5 TABLET ORAL at 20:39

## 2017-06-12 RX ADMIN — ACETAMINOPHEN 650 MG: 325 TABLET, FILM COATED ORAL at 10:21

## 2017-06-12 RX ADMIN — INSULIN ASPART 4 UNITS: 100 INJECTION, SOLUTION INTRAVENOUS; SUBCUTANEOUS at 20:38

## 2017-06-12 RX ADMIN — OXYBUTYNIN CHLORIDE 5 MG: 5 TABLET, EXTENDED RELEASE ORAL at 10:11

## 2017-06-12 RX ADMIN — FAMOTIDINE 20 MG: 20 TABLET, FILM COATED ORAL at 10:11

## 2017-06-12 RX ADMIN — ATORVASTATIN CALCIUM 20 MG: 20 TABLET, FILM COATED ORAL at 20:38

## 2017-06-12 RX ADMIN — INSULIN ASPART 2 UNITS: 100 INJECTION, SOLUTION INTRAVENOUS; SUBCUTANEOUS at 11:30

## 2017-06-12 RX ADMIN — DILTIAZEM HYDROCHLORIDE 480 MG: 240 CAPSULE, COATED, EXTENDED RELEASE ORAL at 10:11

## 2017-06-12 RX ADMIN — CARVEDILOL 25 MG: 25 TABLET, FILM COATED ORAL at 20:38

## 2017-06-12 RX ADMIN — LEVOTHYROXINE SODIUM 150 MCG: 150 TABLET ORAL at 06:15

## 2017-06-12 RX ADMIN — CEFTRIAXONE 2 G: 2 INJECTION, POWDER, FOR SOLUTION INTRAMUSCULAR; INTRAVENOUS at 12:47

## 2017-06-12 RX ADMIN — CARVEDILOL 25 MG: 25 TABLET, FILM COATED ORAL at 10:11

## 2017-06-12 RX ADMIN — BUPROPION HYDROCHLORIDE 150 MG: 150 TABLET, FILM COATED, EXTENDED RELEASE ORAL at 10:11

## 2017-06-12 RX ADMIN — ENOXAPARIN SODIUM 40 MG: 40 INJECTION SUBCUTANEOUS at 10:10

## 2017-06-12 RX ADMIN — LITHIUM CARBONATE 450 MG: 450 TABLET, EXTENDED RELEASE ORAL at 20:38

## 2017-06-12 RX ADMIN — ENOXAPARIN SODIUM 40 MG: 40 INJECTION SUBCUTANEOUS at 20:38

## 2017-06-12 NOTE — PLAN OF CARE
Problem: Fall Risk (Adult)  Goal: Absence of Falls  Outcome: Ongoing (interventions implemented as appropriate)    Problem: Infection, Risk/Actual (Adult)  Goal: Infection Prevention/Resolution  Outcome: Ongoing (interventions implemented as appropriate)

## 2017-06-12 NOTE — DISCHARGE PLACEMENT REQUEST
"Updated clinicals    Caty Ortega (57 y.o. Female)     Date of Birth Social Security Number Address Home Phone MRN    1960  [address]  [address] [place] [id number] [phone] [id number]    Jew Marital Status          Unknown Single       Admission Date Admission Type Admitting Provider Attending Provider Department, Room/Bed    6/8/17 Elective Raji Almodovar MD Pais, Roshan, MD [facility] MED SURG  4, 406/1    Discharge Date Discharge Disposition Discharge Destination                      Attending Provider: Raji Almodovar MD     Allergies:  Allegra [Fexofenadine], Lamotrigine, Nortriptyline, Prozac [Fluoxetine], Trazodone, Trazodone And Nefazodone    Isolation:  Contact   Infection:  MRSA (05/26/17), Bed Bugs (06/09/17)   Code Status:  FULL    Ht:  67.5\" (171.5 cm)   Wt:  401 lb 11.2 oz (182 kg)    Admission Cmt:  None   Principal Problem:  Cellulitis of left lower extremity [L03.116]                 Active Insurance as of 6/8/2017     Primary Coverage     Payor Plan Insurance Group Employer/Plan Group    ANTHEM MEDICARE REPLACEMENT ANTHEM MEDICARE ADVANTAGE [id number]     Payor Plan Address Payor Plan Phone Number Effective From Effective To    [address] [phone] 1/1/2016     [place] [address]       Subscriber Name Subscriber Birth Date Member ID       CATY ORTEGA 1960 [id number]                 Emergency Contacts      (Rel.) Home Phone Work Phone Mobile Phone    Odalis Diallo (Other) [phone] -- --    Sirisha Ng (Friend) -- -- [phone]               History & Physical      LAURENCE Eubanks at 6/8/2017  4:07 PM     Attestation signed by Raji Almodovar MD at 6/9/2017  4:43 PM        I have reviewed the above note including labs, medications, radiology testing. I agree with the assessment and treatment plan.                                   [facility] HOSPITALIST   HISTORY AND " PHYSICAL          Name:  Caty Garcia   Age:  57 y.o.  Sex:  female  :  1960  MRN:  [id number]   Visit Number:  [id number]  Admission Date:  2017  Date Of Service:  17  Primary Care Physician:  Jose Katz MD    Chief Complaint:   Left lower extremity cellulitis    History Of Presenting Illness:    This is a 57-year-old female with past medical history significant for chronic venous insufficiency, peripheral vascular disease, morbid obesity, diabetes mellitus type 2, dyslipidemia, hypothyroidism, and chronic essential hypertension who was last admitted to this facility and discharged on 17 were at that time she was treated for left lower extremity cellulitis secondary to MRSA.  She was discharged home on IV antibiotic in the form of vancomycin.  A PICC line was placed at that time.  Dr. Mehta did see and evaluate the patient during that admission as well to rule out peripheral arterial disease for which she underwent abdominal aortogram with runoff and there was no evidence of significant  peripheral vascular disease.  He felt that this is likely chronic venous insufficiency and had planned to do workup on an outpatient basis.    Patient has been getting IV antibiotic in the form of vancomycin.  She went in today to follow-up with Dr. Villarreal in her office.  Dr. Villarreal was concerned that her left lower extremity was not showing any improvement and felt that she would likely need admission and further workup.      Review Of Systems:     General ROS: Patient denies any fevers, chills or loss of consciousness. Complains of generalized weakness.   Psychological ROS: Denies any hallucinations and delusions.  Ophthalmic ROS: Denies any diplopia or transient loss of vision.  ENT ROS: Denies sore throat, nasal congestion or ear pain.   Allergy and Immunology ROS: Denies rash or itching.  Hematological and Lymphatic ROS: Denies neck swelling or easy bleeding.  Endocrine ROS: Denies any recent  unintentional weight gain or loss.  Breast ROS: Denies any pain or swelling.  Respiratory ROS: Denies cough or shortness of breath.   Cardiovascular ROS: Denies chest pain or palpitations. No history of exertional chest pain.   Gastrointestinal ROS: Denies nausea and vomiting. Denies any abdominal pain. No diarrhea.   Genito-Urinary ROS: Denies dysuria or hematuria.  Musculoskeletal ROS: Complains of chronic back pain. No muscle pain. No calf pain.   Neurological ROS: Denies any focal weakness. No loss of consciousness. Denies any numbness. Denies neck pain.   Dermatological ROS: Denies any redness or pruritis.     Past Medical History:    Past Medical History:   Diagnosis Date   • Arthritis    • Bipolar 1 disorder    • Diabetes mellitus    • Disease of thyroid gland    • High blood pressure    • High cholesterol    • Migraine    • Sleep apnea        Past Surgical history:    Past Surgical History:   Procedure Laterality Date   • CHOLECYSTECTOMY     • COLONOSCOPY     • GALLBLADDER SURGERY     • GASTRIC SLEEVE LAPAROSCOPIC     • INTERVENTIONAL RADIOLOGY PROCEDURE N/A 5/26/2017    Procedure: Abdominal Aortagram with Runoff;  Surgeon: Otf Mehta MD;  Location: [facility] INVASIVE LOCATION;  Service:    • TOTAL HIP ARTHROPLASTY Right    • TOTAL HIP ARTHROPLASTY Left        Social History:  Denies smoking, alcohol or illicit drug use.  She does live alone.        Family History:    Family History   Problem Relation Age of Onset   • Hypertension Father    • Hyperlipidemia Father    • Diabetes Father    • Heart attack Father    • Liver cancer Father        Allergies:      Allegra [fexofenadine]; Lamotrigine; Nortriptyline; Prozac [fluoxetine]; Trazodone; and Trazodone and nefazodone    Home Medications:    Prior to Admission Medications     Prescriptions Last Dose Informant Patient Reported? Taking?    ARIPiprazole (ABILIFY) 20 MG tablet   Yes No    Take 30 mg by mouth Every Night.    aspirin 81 MG tablet    Yes No    Take 81 mg by mouth Daily.    buPROPion SR (WELLBUTRIN SR) 150 MG 12 hr tablet   Yes No    Take 150 mg by mouth Daily.    carvedilol (COREG) 6.25 MG tablet   Yes No    Take 25 mg by mouth 2 (Two) Times a Day With Meals.    diltiaZEM CD (CARDIZEM CD) 240 MG 24 hr capsule   Yes No    Take 480 mg by mouth Daily.    ergocalciferol (ERGOCALCIFEROL) 50422 UNITS capsule   Yes No    Take 50,000 Units by mouth 1 (One) Time Per Week.    furosemide (LASIX) 20 MG tablet   Yes No    Take 20 mg by mouth Daily.    glipiZIDE (GLUCOTROL) 10 MG tablet   Yes No    Take 10 mg by mouth 2 (Two) Times a Day Before Meals.    levothyroxine (SYNTHROID, LEVOTHROID) 150 MCG tablet   Yes No    Take 150 mcg by mouth Daily.    lithium (ESKALITH) 450 MG CR tablet   Yes No    Take 450 mg by mouth Every Night.    Multiple Vitamin (MULTI-DAY PO)   Yes No    Take 1 tablet by mouth Daily.    omeprazole (PriLOSEC) 40 MG capsule   Yes No    Take 40 mg by mouth Daily.    simvastatin (ZOCOR) 40 MG tablet   Yes No    Take 40 mg by mouth Every Night.    solifenacin (VESICARE) 10 MG tablet   Yes No    Take 10 mg by mouth Daily.             ED Medications:    Medications   sodium chloride 0.9 % flush 1-10 mL (not administered)   acetaminophen (TYLENOL) tablet 650 mg (650 mg Oral Given 6/9/17 1004)   ondansetron (ZOFRAN) tablet 4 mg (not administered)     Or   ondansetron ODT (ZOFRAN-ODT) disintegrating tablet 4 mg (not administered)     Or   ondansetron (ZOFRAN) injection 4 mg (not administered)   enoxaparin (LOVENOX) syringe 40 mg (40 mg Subcutaneous Given 6/8/17 1812)   Pharmacy to dose vancomycin (not administered)   piperacillin-tazobactam (ZOSYN) IVPB 3.375 g in 100 mL NS (3.375 g Intravenous New Bag 6/9/17 0122)   HYDROcodone-acetaminophen (NORCO) 5-325 MG per tablet 1 tablet (not administered)   dextrose (GLUTOSE) oral gel 20 g (not administered)   dextrose (D50W) solution 25 g (not administered)   glucagon (human recombinant) (GLUCAGEN  DIAGNOSTIC) injection 1 mg (not administered)   insulin aspart (novoLOG) injection 0-14 Units (0 Units Subcutaneous Not Given 6/9/17 0631)   ARIPiprazole (ABILIFY) tablet 30 mg (30 mg Oral Given 6/8/17 2024)   buPROPion SR (WELLBUTRIN SR) 12 hr tablet 150 mg (150 mg Oral Given 6/9/17 0958)   carvedilol (COREG) tablet 25 mg (25 mg Oral Given 6/9/17 0958)   diltiaZEM CD (CARDIZEM CD) 24 hr capsule 480 mg (480 mg Oral Given 6/9/17 0958)   levothyroxine (SYNTHROID, LEVOTHROID) tablet 150 mcg (150 mcg Oral Given 6/9/17 0621)   lithium (ESKALITH) CR tablet 450 mg (450 mg Oral Given 6/8/17 2023)   atorvastatin (LIPITOR) tablet 20 mg (20 mg Oral Given 6/8/17 2023)   oxybutynin XL (DITROPAN-XL) 24 hr tablet 5 mg (5 mg Oral Given 6/9/17 0958)   vancomycin (VANCOCIN) 1,500 mg in sodium chloride 0.9 % 500 mL IVPB (1,500 mg Intravenous New Bag 6/9/17 0955)   vanc trough prior to dose on 6/10 @ 0830. Hold dose if level >20.  (not administered)   famotidine (PEPCID) tablet 20 mg (20 mg Oral Given 6/9/17 0958)       Vital Signs:    Temp:  [97 °F (36.1 °C)-98.6 °F (37 °C)] 97.8 °F (36.6 °C)  Heart Rate:  [55-70] 69  Resp:  [18-20] 20  BP: (116-143)/(65-74) 143/73    Last 3 weights    06/08/17  1634 06/09/17  0540 06/09/17  1050   Weight: (!) 400 lb 4.8 oz (182 kg) (!) 402 lb 4.8 oz (182 kg) (!) 401 lb 3.8 oz (182 kg)       Body mass index is 61.92 kg/(m^2).    Physical Exam:      General Appearance:  Alert and cooperative, not in any acute distress.   Head:  Atraumatic and normocephalic, without obvious abnormality.   Eyes:          PERRLA, conjunctivae and sclerae normal, no Icterus. No pallor. Extra-occular movements are within normal limits.   Ears:  Ears appear intact with no abnormalities noted.   Throat: No oral lesions, no thrush, oral mucosa moist.   Neck: Supple, trachea midline, no thyromegaly, no carotid bruit.   Back:   No kyphoscoliosis present. No tenderness to palpation,   range of motion normal.   Lungs:   Chest  shape is normal. Breath sounds heard bilaterally equally.  No crackles or wheezing. No Pleural rub or bronchial breathing.   Heart:  Normal S1 and S2, no murmur, no gallop, no rub. No JVD   Abdomen:   Normal bowel sounds, no masses, no organomegaly. Soft     non-tender, non-distended, no guarding, no rebound                Tenderness, obese   Extremities: Moves all extremities well, no edema, no cyanosis, no clubbing.  Bilateral lower extremity edema   Pulses: Pulses palpable and equal bilaterally   Skin: No bleeding, bruising or rash.  Bilateral lower edema.  LLE with erythema from calf to ankle.  She has a large ulcer that is approximately 13cm X 13cm in diameter on anterior lower extremity over her shin with several smaller ulcers on posterior surface.  There is pustulant drainage from the ulcers.  They are moist in appearance.  She has surrounding erythema and edema   Lymph nodes:  Psychiatric/Behavior:    No palpable adenopathy  Mood normal, behavior normal   Neurologic: Cranial nerves 2 - 12 grossly intact, sensation intact, Motor power is normal and equal bilaterally.           Labs:    Lab Results (last 24 hours)     Procedure Component Value Units Date/Time    POC Glucose Fingerstick [[id number]]  (Abnormal) Collected:  06/08/17 1639    Specimen:  Blood Updated:  06/08/17 1700     Glucose 160 (H) mg/dL       Serial Number: [id number]    : [id number]       CBC & Differential [[id number]] Collected:  06/08/17 1655    Specimen:  Blood Updated:  06/08/17 1713    Narrative:       The following orders were created for panel order CBC & Differential.  Procedure                               Abnormality         Status                     ---------                               -----------         ------                     CBC Auto Differential[[id number]]        Abnormal            Final result                 Please view results for these tests on the individual orders.    CBC Auto Differential [[id number]]   (Abnormal) Collected:  06/08/17 1655    Specimen:  Blood Updated:  06/08/17 1713     WBC 9.41 10*3/mm3      RBC 4.25 10*6/mm3      Hemoglobin 12.2 g/dL      Hematocrit 38.7 %      MCV 91.1 fL      MCH 28.7 pg      MCHC 31.5 g/dL      RDW 14.2 %      RDW-SD 46.7 fl      MPV 9.3 fL      Platelets 346 10*3/mm3      Neutrophil % 72.9 %      Lymphocyte % 15.6 %      Monocyte % 7.0 %      Eosinophil % 3.1 %      Basophil % 0.7 %      Immature Grans % 0.7 (H) %      Neutrophils, Absolute 6.85 10*3/mm3      Lymphocytes, Absolute 1.47 10*3/mm3      Monocytes, Absolute 0.66 10*3/mm3      Eosinophils, Absolute 0.29 10*3/mm3      Basophils, Absolute 0.07 10*3/mm3      Immature Grans, Absolute 0.07 (H) 10*3/mm3      nRBC 0.0 /100 WBC     Comprehensive Metabolic Panel [[id number]]  (Abnormal) Collected:  06/08/17 1655    Specimen:  Blood Updated:  06/08/17 1727     Glucose 162 (H) mg/dL      BUN 25 (H) mg/dL      Creatinine 1.60 (H) mg/dL      Sodium 146 (H) mmol/L      Potassium 3.9 mmol/L      Chloride 103 mmol/L      CO2 30.0 mmol/L      Calcium 9.4 mg/dL      Total Protein 7.6 g/dL      Albumin 4.30 g/dL      ALT (SGPT) 67 U/L      AST (SGOT) 50 (H) U/L      Alkaline Phosphatase 105 U/L      Total Bilirubin 0.4 mg/dL      eGFR Non African Amer 33 (L) mL/min/1.73      Globulin 3.3 gm/dL      A/G Ratio 1.3 g/dL      BUN/Creatinine Ratio 15.6     Anion Gap 16.9 mmol/L     Narrative:       Abnormal estimated GFR should be followed by more specific studies to confirm end stage chronic renal disease. The equation used for calculation may not be accurate for patients less than 19 years old, greater than 70 years old, patients at extremes of weight, malnutrition, or with acute renal dysfunction.    POC Glucose Fingerstick [[id number]]  (Normal) Collected:  06/08/17 2009    Specimen:  Blood Updated:  06/08/17 2030     Glucose 130 mg/dL       Serial Number: [id number]    : [id number]       POC Glucose Fingerstick [[id number]]   (Normal) Collected:  06/09/17 0606    Specimen:  Blood Updated:  06/09/17 0616     Glucose 105 mg/dL       Serial Number: [id number]    : [id number]       CBC Auto Differential [[id number]]  (Abnormal) Collected:  06/09/17 0607    Specimen:  Blood Updated:  06/09/17 0643     WBC 8.23 10*3/mm3      RBC 3.82 (L) 10*6/mm3      Hemoglobin 11.0 (L) g/dL      Hematocrit 34.9 (L) %      MCV 91.4 fL      MCH 28.8 pg      MCHC 31.5 g/dL      RDW 14.1 %      RDW-SD 47.3 fl      MPV 9.5 fL      Platelets 306 10*3/mm3      Neutrophil % 65.5 %      Lymphocyte % 21.9 %      Monocyte % 7.9 %      Eosinophil % 3.9 %      Basophil % 0.6 %      Immature Grans % 0.2 %      Neutrophils, Absolute 5.39 10*3/mm3      Lymphocytes, Absolute 1.80 10*3/mm3      Monocytes, Absolute 0.65 10*3/mm3      Eosinophils, Absolute 0.32 10*3/mm3      Basophils, Absolute 0.05 10*3/mm3      Immature Grans, Absolute 0.02 10*3/mm3      nRBC 0.0 /100 WBC     Basic Metabolic Panel [[id number]]  (Abnormal) Collected:  06/09/17 0607    Specimen:  Blood Updated:  06/09/17 0659     Glucose 105 (H) mg/dL      BUN 20 mg/dL      Creatinine 1.20 mg/dL      Sodium 145 mmol/L      Potassium 3.6 mmol/L      Chloride 104 mmol/L      CO2 32.0 (H) mmol/L      Calcium 9.1 mg/dL      eGFR Non African Amer 46 (L) mL/min/1.73      BUN/Creatinine Ratio 16.7     Anion Gap 12.6 mmol/L     Narrative:       Abnormal estimated GFR should be followed by more specific studies to confirm end stage chronic renal disease. The equation used for calculation may not be accurate for patients less than 19 years old, greater than 70 years old, patients at extremes of weight, malnutrition, or with acute renal dysfunction.          Radiology:    Imaging Results (last 72 hours)     ** No results found for the last 72 hours. **          Assessment and Plan:  1.  Left lower extremity cellulitis secondary to MRSA-we will get repeat cultures.  A CT scan of the lower extremity will be done to rule  out abscess.  Dr. Villarreal with infectious disease will be consulted.  We will continue patient on IV antibiotic in the form of vancomycin and add Zosyn as per infectious disease request.  Will consult surgery for evaluation for possible debreidement.  2.  Chronic venous insufficiency-this is to be worked up on an outpatient basis.  Patient underwent an abdominal aortogram with runoff during last admission for which there was no significant evidence of peripheral vascular disease.    3.  Diabetes mellitus type 2 patient is on subcutaneous insulin protocol will monitor blood sugars and titrate accordingly    4.  Acquired hypothyroidism continue home medication    5.  Chronic essential hypertension-we'll continue home medications.    6.  Venous stasis dermatitis    7.  Dyslipidemia continue home medications    8.  Bed bug infestation-  patient had her home sprayed yesterday.  She did have a bed bug present on her clothing when she presented to the floor.    9.  OSAS- CPAP.      Lovenox for DVT prophylaxis     Lima Connolly, APRN  06/09/17  11:48 AM       Electronically signed by Raji Almodovar MD at 6/9/2017  4:43 PM        Vital Signs (last 24 hours)       06/11 0700  -  06/12 0659 06/12 0700  -  06/12 1111   Most Recent    Temp (°F) 97.9 -  98.5      98.4     98.4 (36.9)    Heart Rate 51 -  67      57     57    Resp 16 -  18      18     18    /73 -  163/61      151/73     151/73    SpO2 (%) 95 -  97      94     94          Hospital Medications (active)       Dose Frequency Start End    acetaminophen (TYLENOL) tablet 650 mg 650 mg Every 4 Hours PRN 6/8/2017     Sig - Route: Take 2 tablets by mouth Every 4 (Four) Hours As Needed for Mild Pain (1-3). - Oral    ARIPiprazole (ABILIFY) tablet 30 mg 30 mg Nightly 6/8/2017     Sig - Route: Take 6 tablets by mouth Every Night. - Oral    atorvastatin (LIPITOR) tablet 20 mg 20 mg Nightly 6/8/2017     Sig - Route: Take 1 tablet by mouth Every Night. - Oral    buPROPion  SR (WELLBUTRIN SR) 12 hr tablet 150 mg 150 mg Daily 6/9/2017     Sig - Route: Take 1 tablet by mouth Daily. - Oral    carvedilol (COREG) tablet 25 mg 25 mg 2 Times Daily With Meals 6/8/2017     Sig - Route: Take 1 tablet by mouth 2 (Two) Times a Day With Meals. - Oral    dextrose (D50W) solution 25 g 25 g Every 15 Minutes PRN 6/8/2017     Sig - Route: Infuse 50 mL into a venous catheter Every 15 (Fifteen) Minutes As Needed for Low Blood Sugar (Blood Sugar Less Than 70, Patient Has IV Access - Unresponsive, NPO or Unable To Safely Swallow). - Intravenous    dextrose (GLUTOSE) oral gel 20 g 20 g Every 15 Minutes PRN 6/8/2017     Sig - Route: Take 20 g by mouth Every 15 (Fifteen) Minutes As Needed (Blood Sugar Less Than 70, Patient Alert, Is Not NPO & Can Safely Swallow). - Oral    diltiaZEM CD (CARDIZEM CD) 24 hr capsule 480 mg 480 mg Daily 6/9/2017     Sig - Route: Take 2 capsules by mouth Daily. - Oral    enoxaparin (LOVENOX) syringe 40 mg 40 mg Every 12 Hours 6/9/2017     Sig - Route: Inject 0.4 mL under the skin Every 12 (Twelve) Hours. - Subcutaneous    famotidine (PEPCID) tablet 20 mg 20 mg Daily 6/9/2017     Sig - Route: Take 1 tablet by mouth Daily. - Oral    glucagon (human recombinant) (GLUCAGEN DIAGNOSTIC) injection 1 mg 1 mg Every 15 Minutes PRN 6/8/2017     Sig - Route: Inject 1 mg under the skin Every 15 (Fifteen) Minutes As Needed (Blood Glucose Less Than 70 - Patient Without IV Access - Unresponsive, NPO or Unable To Safely Swallow). - Subcutaneous    HYDROcodone-acetaminophen (NORCO) 5-325 MG per tablet 1 tablet 1 tablet Every 6 Hours PRN 6/8/2017 6/18/2017    Sig - Route: Take 1 tablet by mouth Every 6 (Six) Hours As Needed for Moderate Pain (4-6). - Oral    insulin aspart (novoLOG) injection 0-9 Units 0-9 Units 4 Times Daily Before Meals & Nightly 6/9/2017     Sig - Route: Inject 0-9 Units under the skin 4 (Four) Times a Day Before Meals & at Bedtime. - Subcutaneous    levothyroxine (SYNTHROID,  "LEVOTHROID) tablet 150 mcg 150 mcg Every Early Morning 6/9/2017     Sig - Route: Take 1 tablet by mouth Every Morning. - Oral    lithium (ESKALITH) CR tablet 450 mg 450 mg Nightly 6/8/2017     Sig - Route: Take 1 tablet by mouth Every Night. - Oral    ondansetron (ZOFRAN) injection 4 mg 4 mg Every 6 Hours PRN 6/8/2017     Sig - Route: Infuse 2 mL into a venous catheter Every 6 (Six) Hours As Needed for Nausea or Vomiting. - Intravenous    Linked Group 1:  \"Or\" Linked Group Details        ondansetron (ZOFRAN) tablet 4 mg 4 mg Every 6 Hours PRN 6/8/2017     Sig - Route: Take 1 tablet by mouth Every 6 (Six) Hours As Needed for Nausea or Vomiting. - Oral    Linked Group 1:  \"Or\" Linked Group Details        ondansetron ODT (ZOFRAN-ODT) disintegrating tablet 4 mg 4 mg Every 6 Hours PRN 6/8/2017     Sig - Route: Take 1 tablet by mouth Every 6 (Six) Hours As Needed for Nausea or Vomiting. - Oral    Linked Group 1:  \"Or\" Linked Group Details        oxybutynin XL (DITROPAN-XL) 24 hr tablet 5 mg 5 mg Daily 6/9/2017     Sig - Route: Take 1 tablet by mouth Daily. - Oral    Pharmacy to dose vancomycin  Continuous PRN 6/8/2017     Sig - Route: Continuous As Needed for Consult. - Does not apply    sodium chloride 0.9 % flush 1-10 mL 1-10 mL As Needed 6/8/2017     Sig - Route: Infuse 1-10 mL into a venous catheter As Needed for Line Care. - Intravenous    vancomycin 1750 mg in sodium chloride 0.9% 500 mL IVPB 1,750 mg Every 12 Hours 6/10/2017     Sig - Route: Infuse 1,750 mg into a venous catheter Every 12 (Twelve) Hours. - Intravenous    Vancomycin level scheduled for 6/12 at 0930. Hold next dose if level > 20 mcg/mL.  Once 6/12/2017     Sig - Route: 1 (One) Time. - Does not apply    Linked Group 2:  \"And\" Linked Group Details        vitamin D (ERGOCALCIFEROL) capsule 50,000 Units 50,000 Units Every 7 Days 6/11/2017     Sig - Route: Take 1 capsule by mouth Every 7 (Seven) Days. - Oral    piperacillin-tazobactam (ZOSYN) IVPB 3.375 g " in 100 mL NS (Discontinued) 3.375 g Every 6 Hours 6/8/2017 6/11/2017    Sig - Route: Infuse 3.375 g into a venous catheter Every 6 (Six) Hours. - Intravenous    Vitamin D 1,000 Units (Discontinued) 1,000 Units Daily 6/12/2017 6/11/2017    Sig - Route: Take 1 tablet by mouth Daily. - Oral             Physician Progress Notes (last 24 hours) (Notes from 6/11/2017 11:11 AM through 6/12/2017 11:11 AM)      Real Wright MD at 6/12/2017  7:24 AM  Version 1 of 1              LOS: 4 days   Patient Care Team:  Jose Katz MD as PCP - General        Subjective  patient will change, tolerating compression on the leg.  Eschar still unchanged.  Still has erythema but no active cellulitis present.    Interval History:     Patient Complaints: none    History taken from: patient    Vital Signs  Temp:  [97.9 °F (36.6 °C)-98.5 °F (36.9 °C)] 98.3 °F (36.8 °C)  Heart Rate:  [51-62] 53  Resp:  [16-18] 18  BP: (140-156)/(66-86) 147/86    Physical Exam:     General Appearance:    Alert, cooperative, in no acute distress   Head:    Normocephalic, without obvious abnormality, atraumatic   Lungs:     Clear to auscultation,respirations regular, even and                  unlabored    Heart:    Regular rhythm and normal rate, normal S1 and S2, no            murmur, no gallop, no rub, no click   Abdomen:     Normal bowel sounds, no masses, no organomegaly, soft        non-tender, non-distended, no guarding, no rebound                tenderness   Extremities:   Moves all extremities well, no edema, no cyanosis, no             redness   Pulses:   Pulses palpable and equal bilaterally   Skin:   No bleeding, bruising or rash        Results Review:       Lab Results (last 24 hours)     Procedure Component Value Units Date/Time    Wound Culture [[id number]]  (Abnormal) Collected:  06/09/17 1303    Specimen:  Wound from Leg, Left Updated:  06/11/17 1006     Wound Culture Scant growth (1+) Enterobacter cloacae (A)    POC Glucose Fingerstick  [[id number]]  (Abnormal) Collected:  06/10/17 1603    Specimen:  Blood Updated:  06/11/17 1050     Glucose 168 (H) mg/dL       Serial Number: [id number]    : [id number]       CBC & Differential [[id number]] Collected:  06/12/17 0602    Specimen:  Blood Updated:  06/12/17 0619    Narrative:       The following orders were created for panel order CBC & Differential.  Procedure                               Abnormality         Status                     ---------                               -----------         ------                     CBC Auto Differential[[id number]]        Abnormal            Final result                 Please view results for these tests on the individual orders.    CBC Auto Differential [[id number]]  (Abnormal) Collected:  06/12/17 0602    Specimen:  Blood Updated:  06/12/17 0619     WBC 5.33 10*3/mm3      RBC 3.67 (L) 10*6/mm3      Hemoglobin 10.6 (L) g/dL      Hematocrit 33.6 (L) %      MCV 91.6 fL      MCH 28.9 pg      MCHC 31.5 g/dL      RDW 13.6 %      RDW-SD 46.1 fl      MPV 9.4 fL      Platelets 253 10*3/mm3      Neutrophil % 58.6 %      Lymphocyte % 22.0 %      Monocyte % 11.1 %      Eosinophil % 6.6 %      Basophil % 0.9 %      Immature Grans % 0.8 (H) %      Neutrophils, Absolute 3.13 10*3/mm3      Lymphocytes, Absolute 1.17 10*3/mm3      Monocytes, Absolute 0.59 10*3/mm3      Eosinophils, Absolute 0.35 10*3/mm3      Basophils, Absolute 0.05 10*3/mm3      Immature Grans, Absolute 0.04 10*3/mm3      nRBC 0.0 /100 WBC     POC Glucose Fingerstick [[id number]]  (Abnormal) Collected:  06/11/17 1048    Specimen:  Blood Updated:  06/12/17 0630     Glucose 270 (H) mg/dL       Serial Number: [id number]    : [id number]       POC Glucose Fingerstick [[id number]]  (Normal) Collected:  06/11/17 1602    Specimen:  Blood Updated:  06/12/17 0631     Glucose 109 mg/dL       Serial Number: [id number]    : [id number]       POC Glucose Fingerstick [[id number]]  (Abnormal) Collected:  06/11/17 2010     Specimen:  Blood Updated:  06/12/17 0632     Glucose 138 (H) mg/dL       Serial Number: [id number]    : [id number]       POC Glucose Fingerstick [[id number]]  (Normal) Collected:  06/12/17 0614    Specimen:  Blood Updated:  06/12/17 0633     Glucose 114 mg/dL       Serial Number: [id number]    : [id number]       Basic Metabolic Panel [[id number]]  (Abnormal) Collected:  06/12/17 0602    Specimen:  Blood Updated:  06/12/17 0639     Glucose 132 (H) mg/dL      BUN 9 mg/dL      Creatinine 1.30 mg/dL      Sodium 146 (H) mmol/L      Potassium 3.3 (L) mmol/L      Chloride 107 mmol/L      CO2 31.0 (H) mmol/L      Calcium 8.8 mg/dL      eGFR Non African Amer 42 (L) mL/min/1.73      BUN/Creatinine Ratio 6.9 (L)     Anion Gap 11.3 mmol/L     Narrative:       Abnormal estimated GFR should be followed by more specific studies to confirm end stage chronic renal disease. The equation used for calculation may not be accurate for patients less than 19 years old, greater than 70 years old, patients at extremes of weight, malnutrition, or with acute renal dysfunction.              Assessment/Plan     Principal Problem:    Cellulitis of left lower extremity  Active Problems:    Chronic venous insufficiency    Essential hypertension    Acquired hypothyroidism    Type 2 diabetes mellitus treated without insulin    Morbid obesity with BMI of 60.0-69.9, adult      Stable cellulitis, no worsening cellulitis.  Continue current management and compression.  Only long-term care for wound care.      Real Wright MD  06/12/17  7:24 AM       Electronically signed by Real Wright MD at 6/12/2017  7:25 AM        Consult Notes (last 24 hours) (Notes from 6/11/2017 11:11 AM through 6/12/2017 11:11 AM)     No notes of this type exist for this encounter.        Nutrition Notes (last 24 hours) (Notes from 6/11/2017 11:11 AM through 6/12/2017 11:11 AM)     No notes of this type exist for this encounter.           Physical Therapy Notes  (last 24 hours) (Notes from 2017 11:11 AM through 2017 11:11 AM)      Colby Hawthorne PT at 2017  1:47 PM  Version 1 of 1         Problem: Patient Care Overview (Adult)  Goal: Plan of Care Review  Outcome: Ongoing (interventions implemented as appropriate)    17 1346   Coping/Psychosocial Response Interventions   Plan Of Care Reviewed With patient   Patient Care Overview   Progress progress toward functional goals as expected   Outcome Evaluation   Outcome Summary/Follow up Plan PT eval complete and patient is functioning at her baseline level of function.               Electronically signed by Colby Hawthorne PT at 2017  1:47 PM      Colby Hawthorne PT at 2017  1:49 PM  Version 1 of 1         Acute Care - Physical Therapy Initial Evaluation   [facility]     Patient Name: Caty Garcia  : 1960  MRN: [id number]  Today's Date: 2017   Onset of Illness/Injury or Date of Surgery Date: 17  Date of Referral to PT: 06/10/17  Referring Physician: Scooby.       Admit Date: 2017     Visit Dx:  No diagnosis found.  Patient Active Problem List   Diagnosis   • Rash   • Back pain   • Mood changes   • Mood disorder   • Acute renal failure   • Cellulitis of left lower extremity   • Chronic venous insufficiency   • Essential hypertension   • Acquired hypothyroidism   • Dyslipidemia   • Type 2 diabetes mellitus treated without insulin   • Venous stasis dermatitis of both lower extremities   • Overactive bladder   • GERD with esophagitis   • Cellulitis   • Morbid obesity with BMI of 60.0-69.9, adult     Past Medical History:   Diagnosis Date   • Arthritis    • Bipolar 1 disorder    • Diabetes mellitus    • Disease of thyroid gland    • High blood pressure    • High cholesterol    • Migraine    • Sleep apnea      Past Surgical History:   Procedure Laterality Date   • CHOLECYSTECTOMY     • COLONOSCOPY     • GALLBLADDER SURGERY     • GASTRIC SLEEVE LAPAROSCOPIC     • INTERVENTIONAL  RADIOLOGY PROCEDURE N/A 5/26/2017    Procedure: Abdominal Aortagram with Runoff;  Surgeon: Otf Mehta MD;  Location: [facility] CATH INVASIVE LOCATION;  Service:    • TOTAL HIP ARTHROPLASTY Right    • TOTAL HIP ARTHROPLASTY Left           PT ASSESSMENT (last 72 hours)      PT Evaluation       06/11/17 1030 06/10/17 2040    Rehab Evaluation    Document Type evaluation  -BS     Subjective Information agree to therapy;complains of;pain   Left LE.   -BS     General Information    Patient Profile Review yes  -BS     Onset of Illness/Injury or Date of Surgery Date 06/08/17  -BS     Referring Physician Scooby.   -BS     General Observations Patient with venous ulcer, MRSA infection, left LE. Bed Bug infestation of her lliving environment.   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   -BS     Precautions/Limitations no known precautions/limitations  -BS     Prior Level of Function independent:  -BS     Equipment Currently Used at Home  walker, standard;shower chair  -DW    Plans/Goals Discussed With patient;agreed upon   Medical team is seeking placement for wound management.   -BS     Risks Reviewed patient:;nausea/vomiting;dizziness;increased discomfort;change in vital signs;increased drainage  -BS     Benefits Reviewed patient:;improve function;increase independence;increase strength;increase balance;decrease pain  -BS     Barriers to Rehab none identified  -BS     Living Environment    Lives With alone  -BS     Living Arrangements apartment  -BS     Home Accessibility no concerns  -BS     Transportation Available  family or friend will provide;car  -DW    Living Environment Comment Currently being treated for bed bug infestation.   -BS     Clinical Impression    Date of Referral to PT 06/10/17  -BS     PT Diagnosis Patient is functioning at her baseline level of function.   -BS     Prognosis Good   -BS     Functional Level At  Time Of Evaluation Independent   -BS     Criteria for Skilled Therapeutic Interventions Met no;no problems identified which require skilled intervention  -BS     Pathology/Pathophysiology Noted (Describe Specifically for Each System) integumentary;endocrine/metabolic  -BS     Impairments Found (describe specific impairments) integumentary integrity  -BS     Bed Mobility, Assessment/Treatment    Bed Mob, Supine to Sit, [place] independent  -BS     Transfer Assessment/Treatment    Transfers, Bed-Chair [place] independent  -BS     Gait Assessment/Treatment    Gait, [place] Level independent  -BS     Positioning and Restraints    Pre-Treatment Position sitting in chair/recliner  -BS     Post Treatment Position bed  -BS     In Bed notified nsg;supine;call light within reach;encouraged to call for assist;LLE elevated  -BS       06/09/17 2030 06/08/17 1916    General Information    Equipment Currently Used at Home walker, standard;shower chair  -DW walker, standard;shower chair  -LB    Living Environment    Transportation Available family or friend will provide;car  -DW     Living Environment Comment  pt states a friend drives her to appointments or when she is able uses the city bus  -LB      06/08/17 1706       General Information    Equipment Currently Used at Home glucometer;cane, straight  -SP     Living Environment    Lives With alone  -SP     Living Arrangements apartment  -SP     Home Accessibility other (see comments)   bedbugs active on patient's clothing; apt recently sprayed.  -SP     Stair Railings at Home none  -SP     Type of Financial/Environmental Concern none  -SP     Transportation Available family or friend will provide  -SP       User Key  (r) = Recorded By, (t) = Taken By, (c) = Cosigned By    Initials Name Provider Type    SP Carlo Ivory, PINKY Registered Nurse    MICA Stokes, RN Registered Nurse    JOSÉ Hawthorne, PT Physical Therapist    LB Michelle Hernandez RN Registered Nurse           Physical Therapy Education     Title: PT OT SLP Therapies (Done)     Topic: Physical Therapy (Done)     Point: Mobility training (Done)    Learning Progress Summary    Learner Readiness Method Response Comment Documented by Status   Patient Acceptance E VU Patient demonstrated no skilled inpatient PT needs at this time.  06/11/17 1345 Done               Point: Home exercise program (Done)    Learning Progress Summary    Learner Readiness Method Response Comment Documented by Status   Patient Acceptance E VU Patient demonstrated no skilled inpatient PT needs at this time.  06/11/17 1345 Done               Point: Body mechanics (Done)    Learning Progress Summary    Learner Readiness Method Response Comment Documented by Status   Patient Acceptance E VU Patient demonstrated no skilled inpatient PT needs at this time.  06/11/17 1345 Done               Point: Precautions (Done)    Learning Progress Summary    Learner Readiness Method Response Comment Documented by Status   Patient Acceptance E VU Patient demonstrated no skilled inpatient PT needs at this time.  06/11/17 1345 Done                      User Key     Initials Effective Dates Name Provider Type Discipline     10/26/16 -  Colby Hawthorne PT Physical Therapist PT                PT Recommendation and Plan  Anticipated Discharge Disposition:  (Placement rec's deferred to nursing. )  Demonstrates Need for Referral to Another Service:  (Wound management. )  Plan of Care Review  Plan Of Care Reviewed With: patient  Progress: progress toward functional goals as expected  Outcome Summary/Follow up Plan: PT eval complete and patient is functioning at her baseline level of function.               Outcome Measures       06/11/17 1030          How much help from another person do you currently need...    Turning from your back to your side while in flat bed without using bedrails? 4  -BS      Moving from lying on back to sitting on the side of a flat bed  without bedrails? 4  -BS      Moving to and from a bed to a chair (including a wheelchair)? 4  -BS      Standing up from a chair using your arms (e.g., wheelchair, bedside chair)? 4  -BS      Climbing 3-5 steps with a railing? 4  -BS      To walk in hospital room? 4  -BS      AM-PAC 6 Clicks Score 24  -BS      Functional Assessment    Outcome Measure Options AM-PAC 6 Clicks Basic Mobility (PT)  -BS        User Key  (r) = Recorded By, (t) = Taken By, (c) = Cosigned By    Initials Name Provider Type    BS Colby Hawthorne PT Physical Therapist           Time Calculation:         PT Charges       06/11/17 1348          Time Calculation    Stop Time 1030  -BS      PT Received On 06/11/17  -BS        User Key  (r) = Recorded By, (t) = Taken By, (c) = Cosigned By    Initials Name Provider Type    BS Colby Hawthorne PT Physical Therapist          Therapy Charges for Today     Code Description Service Date Service Provider Modifiers Qty    24907156453 HC PT MOBILITY CURRENT 6/11/2017 Colby Hawthorne PT GP, CH 1    90229933598 HC PT MOBILITY PROJECTED 6/11/2017 Colby Hawthorne PT GP, CH 1    20652552525 HC PT MOBILITY DISCHARGE 6/11/2017 Colby Hawthorne PT GP, CH 1    70908810988 HC PT EVAL LOW COMPLEXITY 4 6/11/2017 Colby Hawthorne PT GP, KX 1          PT G-Codes  PT Professional Judgement Used?: Yes  Outcome Measure Options: AM-PAC 6 Clicks Basic Mobility (PT)  Score: 24  Functional Limitation: Mobility: Walking and moving around  Mobility: Walking and Moving Around Current Status (): 0 percent impaired, limited or restricted  Mobility: Walking and Moving Around Goal Status (): 0 percent impaired, limited or restricted  Mobility: Walking and Moving Around Discharge Status (): 0 percent impaired, limited or restricted      Colby Hawthorne PT  6/11/2017          Electronically signed by Colby Hawthorne PT at 6/11/2017  1:49 PM        Occupational Therapy Notes (last 24 hours) (Notes from 6/11/2017 11:11 AM through  6/12/2017 11:11 AM)     No notes of this type exist for this encounter.        Respiratory Therapy Notes (last 24 hours) (Notes from 6/11/2017 11:11 AM through 6/12/2017 11:11 AM)     No notes of this type exist for this encounter.

## 2017-06-12 NOTE — DISCHARGE PLACEMENT REQUEST
"Caty Ortega (57 y.o. Female)     Date of Birth Social Security Number Address Home Phone MRN    1960  [address]  [address] [place] [id number] [phone] [id number]    [employer] Marital Status          Unknown Single       Admission Date Admission Type Admitting Provider Attending Provider Department, Room/Bed    6/8/17 Elective Raji Almodovar MD Pais, Roshan, MD [facility] MED SURG  4, 406/1    Discharge Date Discharge Disposition Discharge Destination                      Attending Provider: Raji Almodovar MD     Allergies:  Allegra [Fexofenadine], Lamotrigine, Nortriptyline, Prozac [Fluoxetine], Trazodone, Trazodone And Nefazodone    Isolation:  Contact   Infection:  MRSA (05/26/17), Bed Bugs (06/09/17)   Code Status:  FULL    Ht:  67.5\" (171.5 cm)   Wt:  401 lb 11.2 oz (182 kg)    Admission Cmt:  None   Principal Problem:  Cellulitis of left lower extremity [L03.116]                 Active Insurance as of 6/8/2017     Primary Coverage     Payor Plan Insurance Group Employer/Plan Group    ANTHEM MEDICARE REPLACEMENT ANTHEM MEDICARE ADVANTAGE [id number]     Payor Plan Address Payor Plan Phone Number Effective From Effective To    [address] [phone] 1/1/2016     [place] [address]       Subscriber Name Subscriber Birth Date Member ID       CATY ORTEGA 1960 [id number]                 Emergency Contacts      (Rel.) Home Phone Work Phone Mobile Phone    Odalis Diallo (Other) [phone] -- --    Sirisha Ng (Friend) -- -- [phone]            Insurance Information                ANTHEM MEDICARE REPLACEMENT/ANTHEM MEDICARE ADVANTAGE Phone: [phone]    Subscriber: Caty Ortega Subscriber#: [id number]    Group#: [id number] Precert#:           "

## 2017-06-12 NOTE — PROGRESS NOTES
LOS: 4 days   Patient Care Team:  Jose Katz MD as PCP - General        Subjective  patient will change, tolerating compression on the leg.  Eschar still unchanged.  Still has erythema but no active cellulitis present.    Interval History:     Patient Complaints: none    History taken from: patient    Vital Signs  Temp:  [97.9 °F (36.6 °C)-98.5 °F (36.9 °C)] 98.3 °F (36.8 °C)  Heart Rate:  [51-62] 53  Resp:  [16-18] 18  BP: (140-156)/(66-86) 147/86    Physical Exam:     General Appearance:    Alert, cooperative, in no acute distress   Head:    Normocephalic, without obvious abnormality, atraumatic   Lungs:     Clear to auscultation,respirations regular, even and                  unlabored    Heart:    Regular rhythm and normal rate, normal S1 and S2, no            murmur, no gallop, no rub, no click   Abdomen:     Normal bowel sounds, no masses, no organomegaly, soft        non-tender, non-distended, no guarding, no rebound                tenderness   Extremities:   Moves all extremities well, no edema, no cyanosis, no             redness   Pulses:   Pulses palpable and equal bilaterally   Skin:   No bleeding, bruising or rash        Results Review:       Lab Results (last 24 hours)     Procedure Component Value Units Date/Time    Wound Culture [[id number]]  (Abnormal) Collected:  06/09/17 1303    Specimen:  Wound from Leg, Left Updated:  06/11/17 1006     Wound Culture Scant growth (1+) Enterobacter cloacae (A)    POC Glucose Fingerstick [[id number]]  (Abnormal) Collected:  06/10/17 1603    Specimen:  Blood Updated:  06/11/17 1050     Glucose 168 (H) mg/dL       Serial Number: [id number]    : [id number]       CBC & Differential [[id number]] Collected:  06/12/17 0602    Specimen:  Blood Updated:  06/12/17 0619    Narrative:       The following orders were created for panel order CBC & Differential.  Procedure                               Abnormality         Status                     ---------                                -----------         ------                     CBC Auto Differential[[id number]]        Abnormal            Final result                 Please view results for these tests on the individual orders.    CBC Auto Differential [[id number]]  (Abnormal) Collected:  06/12/17 0602    Specimen:  Blood Updated:  06/12/17 0619     WBC 5.33 10*3/mm3      RBC 3.67 (L) 10*6/mm3      Hemoglobin 10.6 (L) g/dL      Hematocrit 33.6 (L) %      MCV 91.6 fL      MCH 28.9 pg      MCHC 31.5 g/dL      RDW 13.6 %      RDW-SD 46.1 fl      MPV 9.4 fL      Platelets 253 10*3/mm3      Neutrophil % 58.6 %      Lymphocyte % 22.0 %      Monocyte % 11.1 %      Eosinophil % 6.6 %      Basophil % 0.9 %      Immature Grans % 0.8 (H) %      Neutrophils, Absolute 3.13 10*3/mm3      Lymphocytes, Absolute 1.17 10*3/mm3      Monocytes, Absolute 0.59 10*3/mm3      Eosinophils, Absolute 0.35 10*3/mm3      Basophils, Absolute 0.05 10*3/mm3      Immature Grans, Absolute 0.04 10*3/mm3      nRBC 0.0 /100 WBC     POC Glucose Fingerstick [[id number]]  (Abnormal) Collected:  06/11/17 1048    Specimen:  Blood Updated:  06/12/17 0630     Glucose 270 (H) mg/dL       Serial Number: [id number]    : [id number]       POC Glucose Fingerstick [[id number]]  (Normal) Collected:  06/11/17 1602    Specimen:  Blood Updated:  06/12/17 0631     Glucose 109 mg/dL       Serial Number: [id number]    : [id number]       POC Glucose Fingerstick [[id number]]  (Abnormal) Collected:  06/11/17 2010    Specimen:  Blood Updated:  06/12/17 0632     Glucose 138 (H) mg/dL       Serial Number: [id number]    : [id number]       POC Glucose Fingerstick [[id number]]  (Normal) Collected:  06/12/17 0614    Specimen:  Blood Updated:  06/12/17 0633     Glucose 114 mg/dL       Serial Number: [id number]    : [id number]       Basic Metabolic Panel [[id number]]  (Abnormal) Collected:  06/12/17 0602    Specimen:  Blood Updated:  06/12/17 0639     Glucose 132 (H) mg/dL      BUN 9  mg/dL      Creatinine 1.30 mg/dL      Sodium 146 (H) mmol/L      Potassium 3.3 (L) mmol/L      Chloride 107 mmol/L      CO2 31.0 (H) mmol/L      Calcium 8.8 mg/dL      eGFR Non African Amer 42 (L) mL/min/1.73      BUN/Creatinine Ratio 6.9 (L)     Anion Gap 11.3 mmol/L     Narrative:       Abnormal estimated GFR should be followed by more specific studies to confirm end stage chronic renal disease. The equation used for calculation may not be accurate for patients less than 19 years old, greater than 70 years old, patients at extremes of weight, malnutrition, or with acute renal dysfunction.              Assessment/Plan     Principal Problem:    Cellulitis of left lower extremity  Active Problems:    Chronic venous insufficiency    Essential hypertension    Acquired hypothyroidism    Type 2 diabetes mellitus treated without insulin    Morbid obesity with BMI of 60.0-69.9, adult      Stable cellulitis, no worsening cellulitis.  Continue current management and compression.  Only long-term care for wound care.      Real Wright MD  06/12/17  7:24 AM

## 2017-06-12 NOTE — THERAPY DISCHARGE NOTE
Acute Care - Occupational Therapy Initial Eval/Discharge  [facility]     Patient Name: Caty Garcia  : 1960  MRN: [id number]  Today's Date: 2017  Onset of Illness/Injury or Date of Surgery Date: 17  Date of Referral to OT: 17  Referring Physician: Scooby.       Admit Date: 2017       ICD-10-CM ICD-9-CM   1. Impaired mobility and ADLs Z74.09 799.89     Patient Active Problem List   Diagnosis   • Rash   • Back pain   • Mood changes   • Mood disorder   • Acute renal failure   • Cellulitis of left lower extremity   • Chronic venous insufficiency   • Essential hypertension   • Acquired hypothyroidism   • Dyslipidemia   • Type 2 diabetes mellitus treated without insulin   • Venous stasis dermatitis of both lower extremities   • Overactive bladder   • GERD with esophagitis   • Cellulitis   • Morbid obesity with BMI of 60.0-69.9, adult     Past Medical History:   Diagnosis Date   • Arthritis    • Bipolar 1 disorder    • Diabetes mellitus    • Disease of thyroid gland    • High blood pressure    • High cholesterol    • Migraine    • Sleep apnea      Past Surgical History:   Procedure Laterality Date   • CHOLECYSTECTOMY     • COLONOSCOPY     • GALLBLADDER SURGERY     • GASTRIC SLEEVE LAPAROSCOPIC     • INTERVENTIONAL RADIOLOGY PROCEDURE N/A 2017    Procedure: Abdominal Aortagram with Runoff;  Surgeon: Otf Mehta MD;  Location: [facility] INVASIVE LOCATION;  Service:    • TOTAL HIP ARTHROPLASTY Right    • TOTAL HIP ARTHROPLASTY Left           OT ASSESSMENT FLOWSHEET (last 72 hours)      OT Evaluation       17 0940 17 2200 17 1030 06/10/17 2040 17 2030    Rehab Evaluation    Document Type evaluation  -TL  evaluation  -BS      Subjective Information no complaints  -TL  agree to therapy;complains of;pain   Left LE.   -BS      Patient Effort, Rehab Treatment good  -TL        General Information    Patient Profile Review yes  -TL  yes  -BS      Onset of  Illness/Injury or Date of Surgery Date 06/08/17  -TL  06/08/17  -BS      Referring Physician   Scooby.   -BS      General Observations Patient volunteers at [facility]  -TL  Patient with venous ulcer, MRSA infection, left LE. Bed Bug infestation of her lliving environment.   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   -BS      Precautions/Limitations no known precautions/limitations  -TL  no known precautions/limitations  -BS      Prior Level of Function independent:  -TL  independent:  -BS      Equipment Currently Used at Home walker, standard;shower chair  -TL walker, standard;shower chair  -DW  walker, standard;shower chair  -DW walker, standard;shower chair  -DW    Plans/Goals Discussed With patient  -TL  patient;agreed upon   Medical team is seeking placement for wound management.   -BS      Risks Reviewed patient:  -TL  patient:;nausea/vomiting;dizziness;increased discomfort;change in vital signs;increased drainage  -BS      Benefits Reviewed patient:  -TL  patient:;improve function;increase independence;increase strength;increase balance;decrease pain  -BS      Barriers to Rehab none identified  -TL  none identified  -BS      Living Environment    Lives With alone  -TL  alone  -BS      Living Arrangements apartment  -TL  apartment  -BS      Home Accessibility   no concerns  -BS      Transportation Available family or friend will provide;car  -TL family or friend will provide;car  -DW  family or friend will provide;car  -DW family or friend will provide;car  -DW    Living Environment Comment   Currently being treated for bed bug infestation.   -BS      Clinical Impression    Date of Referral to OT 06/09/17  -TL        OT Diagnosis Decreased ADL's  -TL        Patient/Family Goals Statement for her leg to heal  -TL         Criteria for Skilled Therapeutic Interventions Met no problems identified which require skilled intervention;current level of function same as previous level of function  -TL        Therapy Frequency evaluation only  -TL        Anticipated Discharge Disposition other (see comments)   placement for wound care  -TL        Functional Level Prior    Ambulation 1-->assistive equipment  -TL        Transferring 1-->assistive equipment  -TL        Toileting 1-->assistive equipment  -TL        Bathing 0-->independent  -TL        Dressing 0-->independent  -TL        Eating 0-->independent  -TL        Communication 0-->understands/communicates without difficulty  -TL        Pain Assessment    Pain Assessment 0-10  -TL        Pain Score 1  -TL        Post Pain Score 1  -TL        Pain Intervention(s) Ambulation/increased activity;Repositioned  -TL        Response to Interventions no increase in pain with activity  -TL        Vision Assessment/Intervention    Visual Impairment other (see comments)   visual impairment pt wears corrective lenses for ADL's  -TL        Cognitive Assessment/Intervention    Current Cognitive/Communication Assessment functional  -TL        Orientation Status oriented x 4  -TL        Follows Commands/Answers Questions able to follow multi-step instructions  -TL        Personal Safety WNL/WFL  -TL        Personal Safety Interventions gait belt;nonskid shoes/slippers when out of bed  -TL        ROM (Range of Motion)    General ROM no range of motion deficits identified   In B UE  -TL        MMT (Manual Muscle Testing)    General MMT Assessment no strength deficits identified   in B UE  -TL        Bed Mobility, Assessment/Treatment    Bed Mob, Supine to Sit, [place]   independent  -BS      Transfer Assessment/Treatment    Transfers, Bed-Chair [place]   independent  -BS      Transfers, Sit-Stand [place] conditional independence  -TL        Transfers, Stand-Sit [place] conditional  independence  -TL        Toilet Transfer, [place] conditional independence  -TL        Functional Mobility    Functional Mobility- Ind. Level conditional independence  -TL        Functional Mobility- Device rolling walker  -TL        Functional Mobility-Distance (Feet) 20  -TL        Upper Body Bathing Assessment/Training    UB Bathing Assess/Train, [place] Level independent  -TL        Lower Body Bathing Assessment/Training    LB Bathing Assess/Train, [place] Level independent  -TL        Upper Body Dressing Assessment/Training    UB Dressing Assess/Train, [place] independent  -TL        Lower Body Dressing Assessment/Training    LB Dressing Assess/Train, Clothing Type doffing:;donning:;socks  -TL        LB Dressing Assess/Train, Position sitting  -TL        LB Dressing Assess/Train, [place] independent  -TL        Toileting Assessment/Training    Toileting Assess/Train, Assistive Device grab bars  -TL        Toileting Assess/Train, Indepen Level conditional independence  -TL        Grooming Assessment/Training    Grooming Assess/Train, Indepen Level independent  -TL        Therapy Exercises    BUE Resistance theraband   red, reviewed B UE theraband exercises  -TL        Positioning and Restraints    Pre-Treatment Position sitting in chair/recliner  -TL  sitting in chair/recliner  -BS      Post Treatment Position chair  -TL  bed  -BS      In Bed   notified nsg;supine;call light within reach;encouraged to call for assist;LLE elevated  -BS      In Chair call light within reach;legs elevated  -TL          User Key  (r) = Recorded By, (t) = Taken By, (c) = Cosigned By    Initials Name Effective Dates    DW Kendra Stokes, RN 10/26/16 -     TL Adri Mead, OTR 01/21/17 -     BS Colby Hawthorne, KAYLIN 10/26/16 -           Occupational Therapy Education     Title: PT OT SLP Therapies (Done)     Topic: Occupational Therapy (Done)     Point: ADL training (Done)    Description: Instruct learner(s) on proper  safety adaptation and remediation techniques during self care or transfers.   Instruct in proper use of assistive devices.    Learning Progress Summary    Learner Readiness Method Response Comment Documented by Status   Patient HANNAH Villar DU   06/12/17 1147 Done               Point: Home exercise program (Done)    Description: Instruct learner(s) on appropriate technique for monitoring, assisting and/or progressing therapeutic exercises/activities.    Learning Progress Summary    Learner Readiness Method Response Comment Documented by Status   Patient HANNAH Villar DU   06/12/17 1147 Done                      User Key     Initials Effective Dates Name Provider Type Discipline     01/21/17 -  BELGICA Graves Occupational Therapist OT                OT Recommendation and Plan  Anticipated Discharge Disposition: other (see comments) (placement for wound care)  Therapy Frequency: evaluation only  Plan of Care Review  Outcome Summary/Follow up Plan: Patient seen for OT initial eval.  Patient at baseline and does not require skilled OT intervention at this time.  Patient given red theraband and BUE exercises were reviewed for a HEP.               Outcome Measures       06/11/17 1030          How much help from another person do you currently need...    Turning from your back to your side while in flat bed without using bedrails? 4  -BS      Moving from lying on back to sitting on the side of a flat bed without bedrails? 4  -BS      Moving to and from a bed to a chair (including a wheelchair)? 4  -BS      Standing up from a chair using your arms (e.g., wheelchair, bedside chair)? 4  -BS      Climbing 3-5 steps with a railing? 4  -BS      To walk in hospital room? 4  -BS      AM-PAC 6 Clicks Score 24  -BS      Functional Assessment    Outcome Measure Options AM-PAC 6 Clicks Basic Mobility (PT)  -BS        User Key  (r) = Recorded By, (t) = Taken By, (c) = Cosigned By    Initials Name Provider Type    BS Colby  KAYLIN Hawthorne Physical Therapist          Time Calculation:         Time Calculation- OT       06/12/17 1154          Time Calculation- OT    OT Start Time 0940  -TL      OT Received On 06/12/17  -TL        User Key  (r) = Recorded By, (t) = Taken By, (c) = Cosigned By    Initials Name Provider Type    TL BELGICA Graves Occupational Therapist          Therapy Charges for Today     Code Description Service Date Service Provider Modifiers Qty    75862605469 HC OT EVAL LOW COMPLEXITY 4 6/12/2017 BELGICA Graves KX 1               OT Discharge Summary  Anticipated Discharge Disposition: other (see comments) (placement for wound care)    BELGICA Graves  6/12/2017

## 2017-06-12 NOTE — PLAN OF CARE
Problem: Patient Care Overview (Adult)  Goal: Plan of Care Review  Outcome: Outcome(s) achieved Date Met:  06/12/17 06/12/17 6671   Outcome Evaluation   Outcome Summary/Follow up Plan Patient seen for OT initial eval. Patient at baseline and does not require skilled OT intervention at this time. Patient given red theraband and BUE exercises were reviewed for a HEP.

## 2017-06-12 NOTE — PROGRESS NOTES
PROGRESS NOTE        Date of Admission: 6/8/2017  Length of Stay: 4  Primary Care Physician: Jose Katz MD    Subjective   Chief Complaint:  Follow up LLE cellulitis  HPI:  Patient was seen today.  She is sitting up to bedside in chair. She was admitted for LLE cellulitis and non healing ulcer.  During her previous admission she weren't was worked up by Dr. Mehta where she underwent an abdominal aortogram with runoff.  There wasn't any significant stenosis.  He did feel that this was mostly venous.  She has an appointment to follow-up with Dr. Mehta in his office for an outpatient venous workup.  Patient had been getting IV vancomycin at home and finish the day prior to this readmission.  Her cultures are growing Enterobacter Colace a and Alcaligenes and knees BCs.  Her antibiotic have been changed to vancomycin has been discontinued and she will be continued on Zosyn.  She was seen and evaluated by Dr. Wright with general surgery who does not feel that any surgical debridement is needed at this time.  Wound care was consulted and is given wound care recommendations.  I've had a long discussion with the patient and I feel it be in her best interest to go to rehabilitation upon discharge for her IV antibiotic and aggressive wound care.  Dr. Phil alonso with infectious disease has been consulted.    Review Of Systems:   Review of Systems   General ROS: Patient denies any fevers, chills or loss of consciousness. Complains of generalized weakness.   Psychological ROS: Denies any hallucinations and delusions.  Ophthalmic ROS: Denies any diplopia or transient loss of vision.  ENT ROS: Denies sore throat, nasal congestion or ear pain.   Allergy and Immunology ROS: Denies rash or itching.  Hematological and Lymphatic ROS: Denies neck swelling or easy bleeding.  Endocrine ROS: Denies any recent unintentional weight gain or loss.  Breast ROS: Denies any pain or swelling.  Respiratory ROS: Denies cough or shortness of  breath.   Cardiovascular ROS: Denies chest pain or palpitations. No history of exertional chest pain.   Gastrointestinal ROS: Denies nausea and vomiting. Denies any abdominal pain. No diarrhea.  Genito-Urinary ROS: Denies dysuria or hematuria.  Musculoskeletal ROS: Denies back pain. No muscle pain. No calf pain.   Neurological ROS: Denies any focal weakness. No loss of consciousness. Denies any numbness. Denies neck pain.   Dermatological ROS: Denies any redness or pruritis.    Objective      Temp:  [97.9 °F (36.6 °C)-98.5 °F (36.9 °C)] 98.4 °F (36.9 °C)  Heart Rate:  [51-67] 57  Resp:  [16-18] 18  BP: (142-163)/(61-86) 151/73  Physical Exam    General Appearance:  Alert and cooperative, not in any acute distress.   Head:  Atraumatic and normocephalic, without obvious abnormality.   Eyes:          PERRLA, conjunctivae and sclerae normal, no Icterus. No pallor. Extra-occular movements are within normal limits.   Ears:  Ears appear intact with no abnormalities noted.   Throat: No oral lesions, no thrush, oral mucosa moist.   Neck: Supple, trachea midline, no thyromegaly, no carotid bruit.   Back:   No kyphoscoliosis present. No tenderness to palpation,   range of motion normal.   Lungs:   Chest shape is normal. Breath sounds heard bilaterally equally.  No crackles or wheezing. No Pleural rub or bronchial breathing.   Heart:  Normal S1 and S2, no murmur, no gallop, no rub. No JVD   Abdomen:   Normal bowel sounds, no masses, no organomegaly. Soft     non-tender, non-distended, no guarding, no rebound                Tenderness, obese   Extremities: Moves all extremities well, no edema, no cyanosis, no clubbing.  BLE edema +1-2   Pulses: Pulses palpable and equal bilaterally   Skin: No bleeding, bruising or rash,  LLE with erythema from calf to ankle. She has a large ulcer that is approximately 13cm X 13cm in diameter on anterior lower extremity over her shin with several smaller ulcers on posterior surface.  There is  yellowish slough on the anterior aspect.    She has surrounding erythema and edema.  Venous stasis dermatits.     Lymph nodes: No palpable adenopathy   Neurologic:    Psychiatric/Behavior:     Cranial nerves 2 - 12 grossly intact, sensation intact, Motor power is normal and equal bilaterally.  Mood normal, behavior normal       Results Review:    I have reviewed the labs, radiology results and diagnostic studies.      Results from last 7 days  Lab Units 06/12/17  0602   WBC 10*3/mm3 5.33   HEMOGLOBIN g/dL 10.6*   PLATELETS 10*3/mm3 253       Results from last 7 days  Lab Units 06/12/17  0602   SODIUM mmol/L 146*   POTASSIUM mmol/L 3.3*   TOTAL CO2 mmol/L 31.0*   CREATININE mg/dL 1.30   GLUCOSE mg/dL 132*       Culture Data:    Radiology Data:   Cardiology Data:    I have reviewed the medications.    Assessment/Plan     Assessment and Plan:  1. Left lower extremity cellulitis secondary to Enterobacter Colace a and Alcaligenes species- general surgery has seen and evaluated patient does not feel that surgical debridement is needed.  I have stopped the vancomycin and started patient on IV antibiotic in the form Rocephin.  Her cultures are both sensitive to Rocephin.  Wound care has been consulted and given recommendations for wound management. Case Management has been consulted as patient would like to go to rehabilitation for continued IV antibiotic and aggressive wound care.    2. Chronic venous insufficiency-this is to be worked up on an outpatient basis. Patient underwent an abdominal aortogram with runoff during last admission for which there was no significant evidence of peripheral vascular disease.  She is supposed to follow-up with Dr. Mehta in his office for venous workup on an outpatient basis.  We will ensure that she has an appointment at the time of discharge.     3. Diabetes mellitus type 2 patient is on subcutaneous insulin protocol will monitor blood sugars and titrate accordingly.  Her blood sugars  are stable continue to monitor and titrate insulin accordingly.     4. Acquired hypothyroidism continue home medication     5. Chronic essential hypertension-blood pressure is stable continue home medications.     6. Venous stasis dermatitis-outpatient venous workup to be done by Dr. Mehta.     7. Dyslipidemia continue home medications     8. Bed bug infestation- patient had her home sprayed yesterday. She did have a bed bug present on her clothing when she presented to the floor.     9. OSAS- CPAP.    10.  Chronic kidney disease CKD 3-  Patient is at baseline.         DVT prophylaxis:  Lovenox  Discharge Planning:   LAURENCE Eubanks 06/12/17 11:27 AM

## 2017-06-12 NOTE — PLAN OF CARE
Problem: Patient Care Overview (Adult)  Goal: Plan of Care Review  Outcome: Ongoing (interventions implemented as appropriate)    06/12/17 1703   Coping/Psychosocial Response Interventions   Plan Of Care Reviewed With patient   Patient Care Overview   Progress improving         Problem: NPPV/CPAP (Adult)  Goal: Signs and Symptoms of Listed Potential Problems Will be Absent or Manageable (NPPV/CPAP)  Outcome: Ongoing (interventions implemented as appropriate)    06/12/17 1703   NPPV/CPAP   Problems Assessed (NPPV/CPAP) hypoxia/hypoxemia

## 2017-06-12 NOTE — PLAN OF CARE
Problem: Patient Care Overview (Adult)  Goal: Plan of Care Review  Outcome: Ongoing (interventions implemented as appropriate)    06/11/17 2200   Coping/Psychosocial Response Interventions   Plan Of Care Reviewed With patient   Patient Care Overview   Progress improving   Outcome Evaluation   Outcome Summary/Follow up Plan patient has minimal complaint of pain-wound care done per physician's orders-monitor blood sugar with coverage per protocol-IV antibiotics-will monitor       Goal: Adult Individualization and Mutuality  Outcome: Ongoing (interventions implemented as appropriate)  Goal: Discharge Needs Assessment  Outcome: Ongoing (interventions implemented as appropriate)    Problem: Fall Risk (Adult)  Goal: Absence of Falls  Outcome: Ongoing (interventions implemented as appropriate)    Problem: Infection, Risk/Actual (Adult)  Goal: Infection Prevention/Resolution  Outcome: Ongoing (interventions implemented as appropriate)    Problem: Skin Integrity Impairment, Risk/Actual (Adult)  Goal: Identify Related Risk Factors and Signs and Symptoms  Outcome: Ongoing (interventions implemented as appropriate)  Goal: Skin Integrity/Wound Healing  Outcome: Ongoing (interventions implemented as appropriate)

## 2017-06-12 NOTE — PROGRESS NOTES
Discharge Planning Assessment  [facility]     Patient Name: Caty Garcia  MRN: [id number]  Today's Date: 6/12/2017    Admit Date: 6/8/2017          Discharge Needs Assessment     None            Discharge Plan       06/12/17 1115    Case Management/Social Work Plan    Plan Calll from [facility].  Updated clnicals faxed.          Discharge Placement     Facility/Agency Request Status Selected? Address Phone Number Fax Number    [facility] Pending - Request Sent     [address] [facility] [address] [phone]        Jessica Schroeder 6/12/2017 11:14    Updated clinicals               [facility] Pending - Request Sent     [address] [address] [phone]    [facility] - Request Sent     [address] [address] [phone]    [facility] Pending - Request Sent     [address] [facility] [address] [phone]    [facility] Pending - Request Sent     [address] [facility] [address] [phone]    [facility] Declined     [address], [facility] [address] [phone]        Expected Discharge Date and Time     Expected Discharge Date Expected Discharge Time    Jun 13, 2017               Demographic Summary     None            Functional Status     None            Psychosocial     None            Abuse/Neglect     None            Legal     None            Substance Abuse     None            Patient Forms     None          Jessica Schroeder

## 2017-06-13 LAB
ANION GAP SERPL CALCULATED.3IONS-SCNC: 12 MMOL/L
BACTERIA SPEC ANAEROBE CULT: NORMAL
BASOPHILS # BLD AUTO: 0.04 10*3/MM3 (ref 0–0.2)
BASOPHILS NFR BLD AUTO: 0.7 % (ref 0–2.5)
BUN BLD-MCNC: 10 MG/DL (ref 7–20)
BUN/CREAT SERPL: 7.7 (ref 7.1–23.5)
CALCIUM SPEC-SCNC: 8.9 MG/DL (ref 8.4–10.2)
CHLORIDE SERPL-SCNC: 106 MMOL/L (ref 98–107)
CO2 SERPL-SCNC: 31 MMOL/L (ref 26–30)
CREAT BLD-MCNC: 1.3 MG/DL (ref 0.6–1.3)
DEPRECATED RDW RBC AUTO: 46.9 FL (ref 37–54)
EOSINOPHIL # BLD AUTO: 0.32 10*3/MM3 (ref 0–0.7)
EOSINOPHIL NFR BLD AUTO: 5.3 % (ref 0–7)
ERYTHROCYTE [DISTWIDTH] IN BLOOD BY AUTOMATED COUNT: 13.8 % (ref 11.5–14.5)
GFR SERPL CREATININE-BSD FRML MDRD: 42 ML/MIN/1.73
GLUCOSE BLD-MCNC: 107 MG/DL (ref 74–98)
GLUCOSE BLDC GLUCOMTR-MCNC: 106 MG/DL (ref 70–130)
GLUCOSE BLDC GLUCOMTR-MCNC: 152 MG/DL (ref 70–130)
GLUCOSE BLDC GLUCOMTR-MCNC: 172 MG/DL (ref 70–130)
GLUCOSE BLDC GLUCOMTR-MCNC: 176 MG/DL (ref 70–130)
HCT VFR BLD AUTO: 32.8 % (ref 37–47)
HGB BLD-MCNC: 10.2 G/DL (ref 12–16)
IMM GRANULOCYTES # BLD: 0.02 10*3/MM3 (ref 0–0.06)
IMM GRANULOCYTES NFR BLD: 0.3 % (ref 0–0.6)
LYMPHOCYTES # BLD AUTO: 1.5 10*3/MM3 (ref 0.6–3.4)
LYMPHOCYTES NFR BLD AUTO: 24.7 % (ref 10–50)
MCH RBC QN AUTO: 28.6 PG (ref 27–31)
MCHC RBC AUTO-ENTMCNC: 31.1 G/DL (ref 30–37)
MCV RBC AUTO: 91.9 FL (ref 81–99)
MONOCYTES # BLD AUTO: 0.83 10*3/MM3 (ref 0–0.9)
MONOCYTES NFR BLD AUTO: 13.7 % (ref 0–12)
NEUTROPHILS # BLD AUTO: 3.37 10*3/MM3 (ref 2–6.9)
NEUTROPHILS NFR BLD AUTO: 55.3 % (ref 37–80)
NRBC BLD MANUAL-RTO: 0 /100 WBC (ref 0–0)
PLATELET # BLD AUTO: 247 10*3/MM3 (ref 130–400)
PMV BLD AUTO: 9.6 FL (ref 6–12)
POTASSIUM BLD-SCNC: 3 MMOL/L (ref 3.5–5.1)
RBC # BLD AUTO: 3.57 10*6/MM3 (ref 4.2–5.4)
SODIUM BLD-SCNC: 146 MMOL/L (ref 137–145)
TSH SERPL DL<=0.05 MIU/L-ACNC: 2.08 MIU/ML (ref 0.47–4.68)
WBC NRBC COR # BLD: 6.08 10*3/MM3 (ref 4.8–10.8)

## 2017-06-13 PROCEDURE — 82962 GLUCOSE BLOOD TEST: CPT

## 2017-06-13 PROCEDURE — 99232 SBSQ HOSP IP/OBS MODERATE 35: CPT | Performed by: NURSE PRACTITIONER

## 2017-06-13 PROCEDURE — 80048 BASIC METABOLIC PNL TOTAL CA: CPT | Performed by: NURSE PRACTITIONER

## 2017-06-13 PROCEDURE — 25010000002 ENOXAPARIN PER 10 MG: Performed by: INTERNAL MEDICINE

## 2017-06-13 PROCEDURE — 25010000002 CEFTRIAXONE: Performed by: NURSE PRACTITIONER

## 2017-06-13 PROCEDURE — 63710000001 INSULIN ASPART PER 5 UNITS: Performed by: NURSE PRACTITIONER

## 2017-06-13 PROCEDURE — 85025 COMPLETE CBC W/AUTO DIFF WBC: CPT | Performed by: NURSE PRACTITIONER

## 2017-06-13 PROCEDURE — 84443 ASSAY THYROID STIM HORMONE: CPT | Performed by: NURSE PRACTITIONER

## 2017-06-13 PROCEDURE — 94799 UNLISTED PULMONARY SVC/PX: CPT

## 2017-06-13 PROCEDURE — 99232 SBSQ HOSP IP/OBS MODERATE 35: CPT | Performed by: SURGERY

## 2017-06-13 PROCEDURE — 94660 CPAP INITIATION&MGMT: CPT

## 2017-06-13 RX ORDER — POTASSIUM CHLORIDE 750 MG/1
40 CAPSULE, EXTENDED RELEASE ORAL ONCE
Status: COMPLETED | OUTPATIENT
Start: 2017-06-13 | End: 2017-06-13

## 2017-06-13 RX ADMIN — ACETAMINOPHEN 650 MG: 325 TABLET, FILM COATED ORAL at 14:53

## 2017-06-13 RX ADMIN — ACETAMINOPHEN 650 MG: 325 TABLET, FILM COATED ORAL at 20:09

## 2017-06-13 RX ADMIN — ATORVASTATIN CALCIUM 20 MG: 20 TABLET, FILM COATED ORAL at 20:08

## 2017-06-13 RX ADMIN — CEFTRIAXONE 2 G: 2 INJECTION, POWDER, FOR SOLUTION INTRAMUSCULAR; INTRAVENOUS at 11:42

## 2017-06-13 RX ADMIN — INSULIN ASPART 2 UNITS: 100 INJECTION, SOLUTION INTRAVENOUS; SUBCUTANEOUS at 11:42

## 2017-06-13 RX ADMIN — Medication 10 ML: at 11:42

## 2017-06-13 RX ADMIN — INSULIN ASPART 2 UNITS: 100 INJECTION, SOLUTION INTRAVENOUS; SUBCUTANEOUS at 20:09

## 2017-06-13 RX ADMIN — ENOXAPARIN SODIUM 40 MG: 40 INJECTION SUBCUTANEOUS at 20:09

## 2017-06-13 RX ADMIN — LITHIUM CARBONATE 450 MG: 450 TABLET, EXTENDED RELEASE ORAL at 21:25

## 2017-06-13 RX ADMIN — CARVEDILOL 25 MG: 25 TABLET, FILM COATED ORAL at 08:29

## 2017-06-13 RX ADMIN — LEVOTHYROXINE SODIUM 150 MCG: 150 TABLET ORAL at 06:45

## 2017-06-13 RX ADMIN — BUPROPION HYDROCHLORIDE 150 MG: 150 TABLET, FILM COATED, EXTENDED RELEASE ORAL at 08:29

## 2017-06-13 RX ADMIN — POTASSIUM CHLORIDE 40 MEQ: 750 CAPSULE, EXTENDED RELEASE ORAL at 16:10

## 2017-06-13 RX ADMIN — OXYBUTYNIN CHLORIDE 5 MG: 5 TABLET, EXTENDED RELEASE ORAL at 08:29

## 2017-06-13 RX ADMIN — FAMOTIDINE 20 MG: 20 TABLET, FILM COATED ORAL at 08:29

## 2017-06-13 RX ADMIN — CARVEDILOL 25 MG: 25 TABLET, FILM COATED ORAL at 20:09

## 2017-06-13 RX ADMIN — ENOXAPARIN SODIUM 40 MG: 40 INJECTION SUBCUTANEOUS at 08:29

## 2017-06-13 RX ADMIN — DILTIAZEM HYDROCHLORIDE 480 MG: 240 CAPSULE, COATED, EXTENDED RELEASE ORAL at 08:29

## 2017-06-13 RX ADMIN — ARIPIPRAZOLE 30 MG: 5 TABLET ORAL at 20:08

## 2017-06-13 NOTE — PLAN OF CARE
Problem: NPPV/CPAP (Adult)  Intervention: Prevent Aspiration During Therapy    06/12/17 1600   Positioning   Head Of Bed (HOB) Position HOB elevated       Intervention: Assess/Manage Patient Tolerance of Noninvasive Ventilation    06/13/17 0111   Respiratory Interventions   Airway/Ventilation Management airway patency maintained       Intervention: Prevent/Minimize Device Friction/Shearing and Pressure Points    06/13/17 0111   Respiratory Interventions   NPPV/CPAP Maintenance other (see comments)  (proper mask fit)         Goal: Signs and Symptoms of Listed Potential Problems Will be Absent or Manageable (NPPV/CPAP)  Outcome: Ongoing (interventions implemented as appropriate)    06/13/17 0111   NPPV/CPAP   Problems Assessed (NPPV/CPAP) hypoxia/hypoxemia;situational response;skin breakdown;dry mucous membranes   Problems Present (NPPV/CPAP) none

## 2017-06-13 NOTE — PLAN OF CARE
Problem: Fall Risk (Adult)  Goal: Absence of Falls  Outcome: Ongoing (interventions implemented as appropriate)    06/13/17 0208   Fall Risk (Adult)   Absence of Falls making progress toward outcome         Problem: Infection, Risk/Actual (Adult)  Goal: Infection Prevention/Resolution  Outcome: Ongoing (interventions implemented as appropriate)    06/13/17 0208   Infection, Risk/Actual (Adult)   Infection Prevention/Resolution making progress toward outcome         Problem: Skin Integrity Impairment, Risk/Actual (Adult)  Goal: Identify Related Risk Factors and Signs and Symptoms  Outcome: Ongoing (interventions implemented as appropriate)  Goal: Skin Integrity/Wound Healing  Outcome: Ongoing (interventions implemented as appropriate)    06/13/17 0208   Skin Integrity Impairment, Risk/Actual (Adult)   Skin Integrity/Wound Healing making progress toward outcome

## 2017-06-13 NOTE — PROGRESS NOTES
LOS: 5 days   Patient Care Team:  Jose Katz MD as PCP - General        Subjective  patient doing well, left foot edema improving with wrapping.    Interval History:     Patient Complaints: none    History taken from: patient    Vital Signs  Temp:  [97.8 °F (36.6 °C)-98.6 °F (37 °C)] 97.8 °F (36.6 °C)  Heart Rate:  [50-67] 50  Resp:  [16-18] 16  BP: (100-151)/(66-78) 100/67    Physical Exam:     General Appearance:    Alert, cooperative, in no acute distress   Head:    Normocephalic, without obvious abnormality, atraumatic   Lungs:     Clear to auscultation,respirations regular, even and                  unlabored    Heart:    Regular rhythm and normal rate, normal S1 and S2, no            murmur, no gallop, no rub, no click   Abdomen:     Normal bowel sounds, no masses, no organomegaly, soft        non-tender, non-distended, no guarding, no rebound                tenderness   Extremities:   Moves all extremities well, no edema, no cyanosis, no             redness   Pulses:   Pulses palpable and equal bilaterally   Skin:   No bleeding, bruising or rash        Results Review:       Lab Results (last 24 hours)     Procedure Component Value Units Date/Time    Wound Culture [[id number]]  (Abnormal)  (Susceptibility) Collected:  06/09/17 1303    Specimen:  Wound from Leg, Left Updated:  06/12/17 1023     Wound Culture Scant growth (1+) Enterobacter cloacae (A)      Alcaligenes species (A)    Susceptibility      Enterobacter cloacae     [place]     Amikacin <=16 ug/ml Susceptible     Ampicillin >16 ug/ml Resistant     Ampicillin + Sulbactam 16/8 ug/ml Resistant     Aztreonam <=4 ug/ml Susceptible     Cefazolin >16 ug/ml Resistant     Cefepime <=4 ug/ml Susceptible     Cefotaxime <=2 ug/ml Susceptible     Cefoxitin >16 ug/ml Resistant     Ceftazidime <=1 ug/ml Susceptible     Ceftriaxone <=8 ug/ml Susceptible     Cefuroxime 8 ug/ml Resistant     Ciprofloxacin <=1 ug/ml Susceptible     Doripenem <=0.5 ug/ml Susceptible      Ertapenem <=1 ug/ml Susceptible     Gentamicin <=4 ug/ml Susceptible     Levofloxacin <=2 ug/ml Susceptible     Meropenem <=1 ug/ml Susceptible     Piperacillin + Tazobactam <=16 ug/ml Susceptible     Tetracycline <=4 ug/ml Susceptible     Tigecycline <=1 ug/ml Susceptible     Tobramycin <=4 ug/ml Susceptible     Trimethoprim + Sulfamethoxazole <=2/38 ug/ml Susceptible                Susceptibility      Alcaligenes species     [place]     Amikacin <=16 ug/ml Susceptible     Aztreonam 16 ug/ml Intermediate     Cefepime <=4 ug/ml Susceptible     Cefotaxime <=2 ug/ml Susceptible     Ceftazidime <=1 ug/ml Susceptible     Ceftriaxone <=8 ug/ml Susceptible     Ciprofloxacin <=1 ug/ml Susceptible     Gentamicin <=4 ug/ml Susceptible     Levofloxacin <=2 ug/ml Susceptible     Meropenem <=1 ug/ml Susceptible     Piperacillin + Tazobactam <=16 ug/ml Susceptible     Tobramycin <=4 ug/ml Susceptible     Trimethoprim + Sulfamethoxazole <=2/38 ug/ml Susceptible                    Vancomycin, Trough [[id number]]  (Abnormal) Collected:  06/12/17 0917    Specimen:  Blood Updated:  06/12/17 1105     Vancomycin Trough 17.98 (C) mcg/mL     POC Glucose Fingerstick [[id number]]  (Normal) Collected:  06/12/17 1546    Specimen:  Blood Updated:  06/12/17 1555     Glucose 111 mg/dL       Serial Number: [id number]    : [id number]       POC Glucose Fingerstick [[id number]]  (Abnormal) Collected:  06/12/17 1921    Specimen:  Blood Updated:  06/12/17 1925     Glucose 209 (H) mg/dL       Serial Number: [id number]    : [id number]       TSH [[id number]] Collected:  06/13/17 0550    Specimen:  Blood Updated:  06/13/17 0611    Basic Metabolic Panel [[id number]]  (Abnormal) Collected:  06/13/17 0550    Specimen:  Blood Updated:  06/13/17 0634     Glucose 107 (H) mg/dL      BUN 10 mg/dL      Creatinine 1.30 mg/dL      Sodium 146 (H) mmol/L      Potassium 3.0 (L) mmol/L      Chloride 106 mmol/L      CO2 31.0 (H) mmol/L      Calcium 8.9 mg/dL       eGFR Non African Amer 42 (L) mL/min/1.73      BUN/Creatinine Ratio 7.7     Anion Gap 12.0 mmol/L     Narrative:       Abnormal estimated GFR should be followed by more specific studies to confirm end stage chronic renal disease. The equation used for calculation may not be accurate for patients less than 19 years old, greater than 70 years old, patients at extremes of weight, malnutrition, or with acute renal dysfunction.    CBC & Differential [[id number]] Collected:  06/13/17 0550    Specimen:  Blood Updated:  06/13/17 0634    Narrative:       The following orders were created for panel order CBC & Differential.  Procedure                               Abnormality         Status                     ---------                               -----------         ------                     CBC Auto Differential[[id number]]        Abnormal            Final result                 Please view results for these tests on the individual orders.    CBC Auto Differential [[id number]]  (Abnormal) Collected:  06/13/17 0550    Specimen:  Blood Updated:  06/13/17 0634     WBC 6.08 10*3/mm3      RBC 3.57 (L) 10*6/mm3      Hemoglobin 10.2 (L) g/dL      Hematocrit 32.8 (L) %      MCV 91.9 fL      MCH 28.6 pg      MCHC 31.1 g/dL      RDW 13.8 %      RDW-SD 46.9 fl      MPV 9.6 fL      Platelets 247 10*3/mm3      Neutrophil % 55.3 %      Lymphocyte % 24.7 %      Monocyte % 13.7 (H) %      Eosinophil % 5.3 %      Basophil % 0.7 %      Immature Grans % 0.3 %      Neutrophils, Absolute 3.37 10*3/mm3      Lymphocytes, Absolute 1.50 10*3/mm3      Monocytes, Absolute 0.83 10*3/mm3      Eosinophils, Absolute 0.32 10*3/mm3      Basophils, Absolute 0.04 10*3/mm3      Immature Grans, Absolute 0.02 10*3/mm3      nRBC 0.0 /100 WBC               Assessment/Plan     Principal Problem:    Cellulitis of left lower extremity  Active Problems:    Chronic venous insufficiency    Essential hypertension    Acquired hypothyroidism    Type 2 diabetes mellitus  treated without insulin    Morbid obesity with BMI of 60.0-69.9, adult      Venous stasis disease with large ulcer left lower extremity.  Appears to be tolerating compression well.  We will still need rehabilitation placement for wound care for outpatient care at home.  Nothing asthma surgical standpoint.      Real Wright MD  06/13/17  6:48 AM

## 2017-06-13 NOTE — PROGRESS NOTES
Discharge Planning Assessment  [facility]     Patient Name: Caty Garcia  MRN: [id number]  Today's Date: 6/13/2017    Admit Date: 6/8/2017          Discharge Needs Assessment     None            Discharge Plan       06/13/17 1253    Case Management/Social Work Plan    Plan Call from [facility] with Carmine.  Still awaiting insurance pre-cert.         Discharge Placement     Facility/Agency Request Status Selected? Address Phone Number Fax Number    [facility] Pending - Request Sent     [address] [facility] [address] [phone]        Jessica Schroeder 6/12/2017 11:14    Updated clinicals               [facility] Pending - Request Sent     [address] [facility] [address] [phone]    [facility] P[facility] - Request Sent     [address] [facility] [address] [phone]    [facility]     [address], [facility], [facility] [address] [phone]    [facility] Pending - Request Sent     [address] [facility] [address] [phone]    [facility] Declined     [address], [facility], [facility] [address] [phone]        Expected Discharge Date and Time     Expected Discharge Date Expected Discharge Time    Jun 13, 2017               Demographic Summary     None            Functional Status     None            Psychosocial     None            Abuse/Neglect     None            Legal     None            Substance Abuse     None            Patient Forms     None          Jessica Schroeder

## 2017-06-13 NOTE — PROGRESS NOTES
PROGRESS NOTE        Date of Admission: 6/8/2017  Length of Stay: 5  Primary Care Physician: Jose Katz MD    Subjective   Chief Complaint:  Follow up LLE cellulitis  HPI:  Patient is sitting up to bedside in chair.  She was admitted for left lower service cellulitis and nonhealing ulcer.  CT scan did not show any evidence of abscess.  Dr. Wright as seen and evaluated the patient does not feel that surgical debridement is needed at this time.  Wound care did evaluate and gave wound care recommendations.  Those orders have been placed and are being carried out.  Case management has been consulted for rehabilitation placement for IV antibiotic some wound care.  Patient's cultures are growing Enterobacter Colace a an Endo Catch and knees.  Her blood cultures are negative thus far.  She is on IV antibiotic CINA form Rocephin.  She denies any chest pain, nausea, vomiting, shortness of breath.      Review Of Systems:   Review of Systems   General ROS: Patient denies any fevers, chills or loss of consciousness. Complains of generalized weakness.   Psychological ROS: Denies any hallucinations and delusions.  Ophthalmic ROS: Denies any diplopia or transient loss of vision.  ENT ROS: Denies sore throat, nasal congestion or ear pain.   Allergy and Immunology ROS: Denies rash or itching.  Hematological and Lymphatic ROS: Denies neck swelling or easy bleeding.  Endocrine ROS: Denies any recent unintentional weight gain or loss.  Breast ROS: Denies any pain or swelling.  Respiratory ROS: Denies cough or shortness of breath.   Cardiovascular ROS: Denies chest pain or palpitations. No history of exertional chest pain.   Gastrointestinal ROS: Denies nausea and vomiting. Denies any abdominal pain. No diarrhea.  Genito-Urinary ROS: Denies dysuria or hematuria.  Musculoskeletal ROS: Denies back pain. No muscle pain. No calf pain.   Neurological ROS: Denies any focal weakness. No loss of consciousness. Denies any numbness. Denies  neck pain.   Dermatological ROS: Denies any redness or pruritis.    Objective      Temp:  [97.8 °F (36.6 °C)-98.6 °F (37 °C)] 98.3 °F (36.8 °C)  Heart Rate:  [50-67] 53  Resp:  [16-18] 16  BP: (100-150)/(63-76) 150/63  Physical Exam    General Appearance:  Alert and cooperative, not in any acute distress.   Head:  Atraumatic and normocephalic, without obvious abnormality.   Eyes:          PERRLA, conjunctivae and sclerae normal, no Icterus. No pallor. Extra-occular movements are within normal limits.   Ears:  Ears appear intact with no abnormalities noted.   Throat: No oral lesions, no thrush, oral mucosa moist.   Neck: Supple, trachea midline, no thyromegaly, no carotid bruit.   Back:   No kyphoscoliosis present. No tenderness to palpation,   range of motion normal.   Lungs:   Chest shape is normal. Breath sounds heard bilaterally equally.  No crackles or wheezing. No Pleural rub or bronchial breathing.   Heart:  Normal S1 and S2, no murmur, no gallop, no rub. No JVD   Abdomen:   Normal bowel sounds, no masses, no organomegaly. Soft     non-tender, non-distended, no guarding, no rebound                Tenderness, obese   Extremities: Moves all extremities well, no edema, no cyanosis, no clubbing.  BLE edema +1-2   Pulses: Pulses palpable and equal bilaterally   Skin: No bleeding, bruising or rash,  LLE with erythema from calf to ankle. She has a large ulcer that is approximately 13cm X 13cm in diameter on anterior lower extremity over her shin with several smaller ulcers on posterior surface.  There is yellowish slough on the anterior aspect.    She has surrounding erythema and edema.  Venous stasis dermatits.     Lymph nodes: No palpable adenopathy   Neurologic:    Psychiatric/Behavior:     Cranial nerves 2 - 12 grossly intact, sensation intact, Motor power is normal and equal bilaterally.  Mood normal, behavior normal       Results Review:    I have reviewed the labs, radiology results and diagnostic  studies.      Results from last 7 days  Lab Units 06/13/17  0550   WBC 10*3/mm3 6.08   HEMOGLOBIN g/dL 10.2*   PLATELETS 10*3/mm3 247       Results from last 7 days  Lab Units 06/13/17  0550   SODIUM mmol/L 146*   POTASSIUM mmol/L 3.0*   TOTAL CO2 mmol/L 31.0*   CREATININE mg/dL 1.30   GLUCOSE mg/dL 107*       Culture Data:    Radiology Data:   Cardiology Data:    I have reviewed the medications.    Assessment/Plan     Assessment and Plan:  1. Left lower extremity cellulitis secondary to Enterobacter Colace a and Alcaligenes species- general surgery has seen and evaluated patient does not feel that surgical debridement is needed. His management is currently working on rehabilitation placement for IV antibiotic some wound care.  Patient is currently on IV antibiotic in the form Rocephin.[facility] has accepted the patient we are currently awaiting on insurance authorization.    2. Chronic venous insufficiency-  this is to be worked up on an outpatient basis. Patient underwent an abdominal aortogram with runoff during last admission for which there was no significant evidence of peripheral vascular disease.  She is supposed to follow-up with Dr. Mehta in his office for venous workup on an outpatient basis.  We will ensure that she has an appointment at the time of discharge.     3. Diabetes mellitus type 2 patient is on subcutaneous insulin protocol will monitor blood sugars and titrate accordingly.  Her blood sugars are stable continue to monitor and titrate insulin accordingly.     4. Acquired hypothyroidism continue home medication     5. Chronic essential hypertension-blood pressure is stable continue home medications.    6.  Hypokalemia-  Supplement     7. Venous stasis dermatitis-outpatient venous workup to be done by Dr. Mehta.     8. Dyslipidemia continue home medications     9. Bed bug infestation- patient had her home sprayed yesterday. She did have a bed bug present on her clothing when  she presented to the floor.     10. OSAS- CPAP.    11.  Chronic kidney disease CKD 3-  Patient is at baseline.    12.  Morbid obesity-nutritional counseling           DVT prophylaxis:  Lovenox  Discharge Planning:   LAURENCE Eubanks 06/13/17 2:56 PM

## 2017-06-14 VITALS
HEIGHT: 68 IN | OXYGEN SATURATION: 92 % | TEMPERATURE: 98.6 F | HEART RATE: 53 BPM | SYSTOLIC BLOOD PRESSURE: 134 MMHG | BODY MASS INDEX: 44.41 KG/M2 | RESPIRATION RATE: 18 BRPM | DIASTOLIC BLOOD PRESSURE: 75 MMHG | WEIGHT: 293 LBS

## 2017-06-14 LAB
ANION GAP SERPL CALCULATED.3IONS-SCNC: 13.1 MMOL/L
BUN BLD-MCNC: 10 MG/DL (ref 7–20)
BUN/CREAT SERPL: 8.3 (ref 7.1–23.5)
CALCIUM SPEC-SCNC: 9 MG/DL (ref 8.4–10.2)
CHLORIDE SERPL-SCNC: 106 MMOL/L (ref 98–107)
CO2 SERPL-SCNC: 29 MMOL/L (ref 26–30)
CREAT BLD-MCNC: 1.2 MG/DL (ref 0.6–1.3)
GFR SERPL CREATININE-BSD FRML MDRD: 46 ML/MIN/1.73
GLUCOSE BLD-MCNC: 123 MG/DL (ref 74–98)
GLUCOSE BLDC GLUCOMTR-MCNC: 121 MG/DL (ref 70–130)
GLUCOSE BLDC GLUCOMTR-MCNC: 156 MG/DL (ref 70–130)
MAGNESIUM SERPL-MCNC: 1.9 MG/DL (ref 1.6–2.3)
POTASSIUM BLD-SCNC: 3.1 MMOL/L (ref 3.5–5.1)
SODIUM BLD-SCNC: 145 MMOL/L (ref 137–145)

## 2017-06-14 PROCEDURE — 99239 HOSP IP/OBS DSCHRG MGMT >30: CPT | Performed by: NURSE PRACTITIONER

## 2017-06-14 PROCEDURE — 82962 GLUCOSE BLOOD TEST: CPT

## 2017-06-14 PROCEDURE — 83735 ASSAY OF MAGNESIUM: CPT | Performed by: NURSE PRACTITIONER

## 2017-06-14 PROCEDURE — 80048 BASIC METABOLIC PNL TOTAL CA: CPT | Performed by: NURSE PRACTITIONER

## 2017-06-14 PROCEDURE — 25010000002 ENOXAPARIN PER 10 MG: Performed by: INTERNAL MEDICINE

## 2017-06-14 PROCEDURE — 99232 SBSQ HOSP IP/OBS MODERATE 35: CPT | Performed by: SURGERY

## 2017-06-14 PROCEDURE — 94660 CPAP INITIATION&MGMT: CPT

## 2017-06-14 PROCEDURE — 94799 UNLISTED PULMONARY SVC/PX: CPT

## 2017-06-14 RX ORDER — POTASSIUM CHLORIDE 750 MG/1
40 CAPSULE, EXTENDED RELEASE ORAL DAILY
Status: DISCONTINUED | OUTPATIENT
Start: 2017-06-14 | End: 2017-06-14 | Stop reason: HOSPADM

## 2017-06-14 RX ORDER — HYDROCODONE BITARTRATE AND ACETAMINOPHEN 5; 325 MG/1; MG/1
1 TABLET ORAL EVERY 6 HOURS PRN
Qty: 10 TABLET | Refills: 0 | Status: SHIPPED | OUTPATIENT
Start: 2017-06-14 | End: 2017-06-18

## 2017-06-14 RX ORDER — POTASSIUM CHLORIDE 750 MG/1
10 TABLET, EXTENDED RELEASE ORAL DAILY
Start: 2017-06-14

## 2017-06-14 RX ADMIN — POTASSIUM CHLORIDE 40 MEQ: 750 CAPSULE, EXTENDED RELEASE ORAL at 15:07

## 2017-06-14 RX ADMIN — ENOXAPARIN SODIUM 40 MG: 40 INJECTION SUBCUTANEOUS at 10:31

## 2017-06-14 RX ADMIN — LEVOTHYROXINE SODIUM 150 MCG: 150 TABLET ORAL at 05:30

## 2017-06-14 RX ADMIN — CARVEDILOL 25 MG: 25 TABLET, FILM COATED ORAL at 10:33

## 2017-06-14 RX ADMIN — ACETAMINOPHEN 650 MG: 325 TABLET, FILM COATED ORAL at 05:30

## 2017-06-14 RX ADMIN — FAMOTIDINE 20 MG: 20 TABLET, FILM COATED ORAL at 10:33

## 2017-06-14 RX ADMIN — DILTIAZEM HYDROCHLORIDE 480 MG: 240 CAPSULE, COATED, EXTENDED RELEASE ORAL at 10:32

## 2017-06-14 RX ADMIN — OXYBUTYNIN CHLORIDE 5 MG: 5 TABLET, EXTENDED RELEASE ORAL at 10:32

## 2017-06-14 RX ADMIN — BUPROPION HYDROCHLORIDE 150 MG: 150 TABLET, FILM COATED, EXTENDED RELEASE ORAL at 10:33

## 2017-06-14 NOTE — PLAN OF CARE
Problem: NPPV/CPAP (Adult)  Intervention: Monitor/Manage Anxiety Related to NPPV/CPAP    06/14/17 0057   Coping Strategies   Trust Relationship/Rapport care explained;choices provided       Intervention: Prevent Aspiration During Therapy    06/14/17 0057   Positioning   Head Of Bed (HOB) Position HOB elevated         Goal: Signs and Symptoms of Listed Potential Problems Will be Absent or Manageable (NPPV/CPAP)  Outcome: Ongoing (interventions implemented as appropriate)    06/14/17 0057   NPPV/CPAP   Problems Assessed (NPPV/CPAP) hypoxia/hypoxemia;dry mucous membranes;situational response;skin breakdown   Problems Present (NPPV/CPAP) none

## 2017-06-14 NOTE — PLAN OF CARE
Problem: Patient Care Overview (Adult)  Goal: Plan of Care Review  Outcome: Ongoing (interventions implemented as appropriate)    06/14/17 1344   Coping/Psychosocial Response Interventions   Plan Of Care Reviewed With patient   Patient Care Overview   Progress improving         Problem: NPPV/CPAP (Adult)  Intervention: Monitor/Manage Anxiety Related to NPPV/CPAP    06/14/17 1344   Coping Strategies   Trust Relationship/Rapport thoughts/feelings acknowledged;care explained       Intervention: Prevent Aspiration During Therapy    06/14/17 1344   Positioning   Head Of Bed (HOB) Position HOB elevated         Goal: Signs and Symptoms of Listed Potential Problems Will be Absent or Manageable (NPPV/CPAP)  Outcome: Ongoing (interventions implemented as appropriate)    06/14/17 1344   NPPV/CPAP   Problems Assessed (NPPV/CPAP) all   Problems Present (NPPV/CPAP) none

## 2017-06-14 NOTE — PROGRESS NOTES
LOS: 6 days   Patient Care Team:  Jose Katz MD as PCP - General        Subjective  patient with little change, no evidence of arterial disease.  She is to see Dr. Mehta as an outpatient for workup of venous disease.  She will need compression stockings for long-term care of venous stasis disease.  Understand her weight is also an issue.    Interval History:     Patient Complaints: none    History taken from: patient    Vital Signs  Temp:  [97.9 °F (36.6 °C)-98.7 °F (37.1 °C)] 97.9 °F (36.6 °C)  Heart Rate:  [50-53] 50  Resp:  [16-18] 18  BP: (135-150)/(63-83) 138/75    Physical Exam:     General Appearance:    Alert, cooperative, in no acute distress   Head:    Normocephalic, without obvious abnormality, atraumatic   Lungs:     Clear to auscultation,respirations regular, even and                  unlabored    Heart:    Regular rhythm and normal rate, normal S1 and S2, no            murmur, no gallop, no rub, no click   Abdomen:     Normal bowel sounds, no masses, no organomegaly, soft        non-tender, non-distended, no guarding, no rebound                tenderness   Extremities:   Moves all extremities well, no edema, no cyanosis, no             redness   Pulses:   Pulses palpable and equal bilaterally   Skin:   No bleeding, bruising or rash        Results Review:       Lab Results (last 24 hours)     Procedure Component Value Units Date/Time    POC Glucose Fingerstick [[id number]]  (Abnormal) Collected:  06/13/17 1550    Specimen:  Blood Updated:  06/13/17 1555     Glucose 152 (H) mg/dL       Serial Number: [id number]    : [id number]       POC Glucose Fingerstick [[id number]]  (Abnormal) Collected:  06/12/17 1101    Specimen:  Blood Updated:  06/13/17 2021     Glucose 176 (H) mg/dL       Serial Number: [id number]    : [id number]       POC Glucose Fingerstick [[id number]]  (Normal) Collected:  06/13/17 0636    Specimen:  Blood Updated:  06/13/17 2024     Glucose 106 mg/dL       Serial Number:  [id number]    : [id number]       POC Glucose Fingerstick [[id number]]  (Abnormal) Collected:  06/13/17 1033    Specimen:  Blood Updated:  06/13/17 2024     Glucose 172 (H) mg/dL       Serial Number: [id number]    : [id number]       POC Glucose Fingerstick [[id number]]  (Abnormal) Collected:  06/13/17 1958    Specimen:  Blood Updated:  06/14/17 0620     Glucose 156 (H) mg/dL       Serial Number: [id number]    : [id number]       POC Glucose Fingerstick [[id number]]  (Normal) Collected:  06/14/17 0529    Specimen:  Blood Updated:  06/14/17 0620     Glucose 121 mg/dL       Serial Number: [id number]    : [id number]       Basic Metabolic Panel [[id number]]  (Abnormal) Collected:  06/14/17 0524    Specimen:  Blood Updated:  06/14/17 0709     Glucose 123 (H) mg/dL      BUN 10 mg/dL      Creatinine 1.20 mg/dL      Sodium 145 mmol/L      Potassium 3.1 (L) mmol/L      Chloride 106 mmol/L      CO2 29.0 mmol/L      Calcium 9.0 mg/dL      eGFR Non African Amer 46 (L) mL/min/1.73      BUN/Creatinine Ratio 8.3     Anion Gap 13.1 mmol/L     Narrative:       Abnormal estimated GFR should be followed by more specific studies to confirm end stage chronic renal disease. The equation used for calculation may not be accurate for patients less than 19 years old, greater than 70 years old, patients at extremes of weight, malnutrition, or with acute renal dysfunction.    Magnesium [[id number]]  (Normal) Collected:  06/14/17 0524    Specimen:  Blood Updated:  06/14/17 0709     Magnesium 1.9 mg/dL               Assessment/Plan     Principal Problem:    Cellulitis of left lower extremity  Active Problems:    Chronic venous insufficiency    Essential hypertension    Acquired hypothyroidism    Type 2 diabetes mellitus treated without insulin    Morbid obesity with BMI of 60.0-69.9, adult      No further need for surgical intervention.  Agree with follow-up with Dr. Mehta.  Follow-up with me on an as-needed basis.      Real Wright,  MD  06/14/17  8:00 AM

## 2017-06-14 NOTE — PROGRESS NOTES
Continued Stay Note   [facility]     Patient Name: Caty Garcia  MRN: [id number]  Today's Date: 6/14/2017    Admit Date: 6/8/2017          Discharge Plan       06/14/17 1507    Case Management/Social Work Plan    Plan Received call back from Samanta at  and can accept today.  Spoke to pt and will go per pvt car.  Can  personal things from Security when leaves but cannot take anything but ID to  due to infestation with bed bugs. [facility] sec informed and Pt informed.         06/14/17 1425    Case Management/Social Work Plan    Plan Received call from Jessenia  at  and has approved pt for rehab. Called to Daysi at , Samanta not available russel have to call back.                 Discharge Codes     None        Expected Discharge Date and Time     Expected Discharge Date Expected Discharge Time    Jun 14, 2017             Penny Samuels

## 2017-06-14 NOTE — DISCHARGE SUMMARY
[facility]   DISCHARGE SUMMARY    Name:  Caty Garcia   Age:  57 y.o.  Sex:  female  :  1960  MRN:  [id number]   Visit Number:  [id number]  Primary Care Physician:  Jose Katz MD  Date of Discharge:  2017  Admission Date:  2017      Presenting Problem:    Cellulitis [L03.90]  Cellulitis [L03.90]       Discharge Diagnosis:     Principal Problem:    Cellulitis of left lower extremity  Active Problems:    Mood disorder    Chronic venous insufficiency    Essential hypertension    Acquired hypothyroidism    Type 2 diabetes mellitus treated without insulin    Venous stasis dermatitis of both lower extremities    Morbid obesity with BMI of 60.0-69.9, adult        Past Medical History:  Past Medical History:   Diagnosis Date   • Arthritis    • Bipolar 1 disorder    • Diabetes mellitus    • Disease of thyroid gland    • High blood pressure    • High cholesterol    • Migraine    • Sleep apnea          Consults:     Consults     Date and Time Order Name Status Description    2017 1138 Inpatient Consult to General Surgery      2017 1634 Inpatient Consult to Infectious Diseases      2017 1004 Inpatient Consult to Infectious Diseases Completed     2017 1953 Inpatient Consult to Nephrology Completed           Procedures Performed:  None      History of presenting illness:  This is a 57-year-old female with past medical history significant for chronic venous insufficiency, peripheral vascular disease, morbid obesity, diabetes mellitus type 2, dyslipidemia, hypothyroidism, and chronic essential hypertension who was last admitted to this facility and discharged on 17 were at that time she was treated for left lower extremity cellulitis secondary to MRSA. She was discharged home on IV antibiotic in the form of vancomycin. A PICC line was placed at that time. Dr. Mehta did see and evaluate the patient during that admission as well to rule out peripheral  arterial disease for which she underwent abdominal aortogram with runoff and there was no evidence of significant peripheral vascular disease. He felt that this is likely chronic venous insufficiency and had planned to do workup on an outpatient basis.   Patient has been getting IV antibiotic in the form of vancomycin. She went in today to follow-up with Dr. Villarreal in her office. Dr. Villarreal was concerned that her left lower extremity was not showing any improvement and felt that she would likely need admission and further workup.         Hospital Course:  Upon admission to the hospitalist service patient was started on IV antibiotic in the form of vancomycin and Zosyn.  Dr. Wright with general surgery was consulted to evaluate for possible need for debridement.  He did see and evaluate the patient and did not feel that debridement would be necessary at this time.  He did recommend compression stockings for long-term venous stasis disease.  CT scan was done which showed cellulitis but no abscess.  Venous duplex showed no evidence of DVT.  She is to follow-up with Dr. Mehta as an outpatient as well.  He had planned to do a venous workup in office.  Wound cultures were seen at which grew Enterobacter Colace and Alcaligenes species.  The IV Zosyn and vancomycin was discontinued and patient was started on Rocephin 2 g IV daily.  She does have a PICC line already in place.  Wound care was consulted for wound care recommendations.  She is currently getting her wound cleaned with normal saline pat dry and apply nonadherent dressing with gauze wrap followed by an Ace wrap daily.  It is to keep the left lower extremity elevated on at least 2 pillows.  I have had a long discussion with the patient about the need for wound care as well as IV antibiotic's upon discharge.  She is in agreement for rehabilitation placement.  Case management has been consulted and has sent referrals for rehabilitation.  We are currently waiting on  preauthorization from her insurance for [facility] swing bed.  She is stable from the hospitalist standpoint for discharge once we have a bed availability.    Upon admission patient was also noted to have bed bugs present on her clothing.  According to the patient she just had her home fumigated for bedbugs.          Vital Signs:    Temp:  [97.9 °F (36.6 °C)-98.7 °F (37.1 °C)] 98.6 °F (37 °C)  Heart Rate:  [50-54] 53  Resp:  [18] 18  BP: (134-146)/(70-83) 134/75    Physical Exam:  General Appearance:    Alert and cooperative, not in any acute distress.   Head:    Atraumatic and normocephalic, without obvious abnormality.   Eyes:            PERRLA, conjunctivae and sclerae normal, no Icterus. No pallor. Extra-occular movements are within normal limits.   Ears:    Ears appear intact with no abnormalities noted.   Throat:   No oral lesions, no thrush, oral mucosa moist.   Neck:   Supple, trachea midline, no thyromegaly, no carotid bruit.   Back:     No kyphoscoliosis present. No tenderness to palpation,   range of motion normal.   Lungs:     Chest shape is normal. Breath sounds heard bilaterally equally.  No crackles or wheezing. No Pleural rub or bronchial breathing.    Heart:    Normal S1 and S2, no murmur, no gallop, no rub. No JVD   Abdomen:     Normal bowel sounds, no masses, no organomegaly. Soft        non-tender, non-distended, no guarding, no rebound                tenderness   Extremities:   Moves all extremities well, no edema, no cyanosis, no             clubbing   Pulses:   Pulses palpable and equal bilaterally   Skin:   No bleeding, bruising or rash.  LLE with erythema from calf to ankle. She has a large ulcer that is approximately 13cm X 13cm in diameter on anterior lower extremity over her shin with several smaller ulcers on posterior surface. There is yellowish slough on the anterior aspect that is coming off. She has surrounding erythema and edema. Venous stasis dermatits.   Lymph  nodes:  Psychiatric/Behavior:    No palpable adenopathy    Normal mood, normal behavior   Neurologic:   Cranial nerves 2 - 12 grossly intact, sensation intact, Motor power is normal and equal bilaterally.           Pertinent Lab Results:       Results from last 7 days  Lab Units 06/14/17  0524 06/13/17  0550 06/12/17  0602  06/08/17  1655   SODIUM mmol/L 145 146* 146*  < > 146*   POTASSIUM mmol/L 3.1* 3.0* 3.3*  < > 3.9   CHLORIDE mmol/L 106 106 107  < > 103   TOTAL CO2 mmol/L 29.0 31.0* 31.0*  < > 30.0   BUN mg/dL 10 10 9  < > 25*   CREATININE mg/dL 1.20 1.30 1.30  < > 1.60*   CALCIUM mg/dL 9.0 8.9 8.8  < > 9.4   BILIRUBIN mg/dL  --   --   --   --  0.4   ALK PHOS U/L  --   --   --   --  105   ALT (SGPT) U/L  --   --   --   --  67   AST (SGOT) U/L  --   --   --   --  50*   GLUCOSE mg/dL 123* 107* 132*  < > 162*   < > = values in this interval not displayed.    Results from last 7 days  Lab Units 06/13/17  0550 06/12/17  0602 06/10/17  0541   WBC 10*3/mm3 6.08 5.33 6.30   HEMOGLOBIN g/dL 10.2* 10.6* 10.7*   HEMATOCRIT % 32.8* 33.6* 34.2*   PLATELETS 10*3/mm3 247 253 299         Wound Culture   Date Value Ref Range Status   06/09/2017 Scant growth (1+) Enterobacter cloacae (A)  Final   06/09/2017 Alcaligenes species (A)  Final         Pertinent Radiology Results:    Imaging Results (all)     Procedure Component Value Units Date/Time    CT Lower Extremity Left Without Contrast [[id number]] Collected:  06/08/17 2309     Updated:  06/08/17 2311    Narrative:       FINAL REPORT    CLINICAL HISTORY:  LEFT LEG CELLULITIS.    FINDINGS:  Multiple contiguous transaxial slices through the left lower extremity were obtained without the intravenous administration of contrast with coronal and sagittal reformatted images.    There is moderate stranding of the subcutaneous fat diffusely without drainable fluid collection. There is no bony erosion or periostitis. There is no fracture, dislocation or radiopaque foreign body.  Musculature is atrophic. There are mild degenerative   changes of the knee with a small knee joint effusion.    Impression:    Cellulitis without abscess      Impression:       Authenticated by Jael Schroeder MD on 06/08/2017 11:09:10 PM    US Venous Doppler Lower Extremity Left (duplex) [[id number]] Collected:  06/10/17 0914     Updated:  06/10/17 0916    Narrative:       PROCEDURE: US VENOUS DOPPLER LOWER EXTREMITY LEFT (DUPLEX)-     HISTORY: LLE edema     PROCEDURE: Multiple transverse and longitudinal scans were performed of  the femoropopliteal deep venous system, with augmentation and  compression maneuvers.     FINDINGS: Normal phasic flow was noted in the visualized deep venous  system. No intraluminal increased echogenicity is noted to suggest  thrombus. There is normal compression and augmentation of the venous  structures. No abnormal venous collaterals are seen. No venous reflux is  demonstrated.       Impression:       No evidence of deep venous thrombosis.     This report was finalized on 6/10/2017 9:14 AM by Freddy Marquez M.D..          Condition on Discharge:    Stable        Discharge Disposition:        Discharge Medication:     Caty Garcia [facility]   Home Medication Instructions KATHLEEN:[id number]    Printed on:06/14/17 1434   Medication Information                      ARIPiprazole (ABILIFY) 20 MG tablet  Take 30 mg by mouth Every Night.             buPROPion SR (WELLBUTRIN SR) 150 MG 12 hr tablet  Take 150 mg by mouth Daily.             carvedilol (COREG) 6.25 MG tablet  Take 25 mg by mouth 2 (Two) Times a Day With Meals.             cefTRIAXone (ROCEPHIN) 2 g/100 mL 0.9% NS (MBP)  Infuse 100 mL into a venous catheter Daily for 10 days. Indications: Skin and Soft Tissue Infection             diltiaZEM CD (CARDIZEM CD) 240 MG 24 hr capsule  Take 480 mg by mouth Daily.             ergocalciferol (ERGOCALCIFEROL) 66400 UNITS capsule  Take 50,000 Units by mouth 1 (One) Time Per Week.              furosemide (LASIX) 20 MG tablet  Take 20 mg by mouth Daily.             glipiZIDE (GLUCOTROL) 10 MG tablet  Take 10 mg by mouth 2 (Two) Times a Day Before Meals.             HYDROcodone-acetaminophen (NORCO) 5-325 MG per tablet  Take 1 tablet by mouth Every 6 (Six) Hours As Needed for Moderate Pain (4-6) for up to 4 days.             insulin aspart (novoLOG) 100 UNIT/ML injection  Inject 0-9 Units under the skin 4 (Four) Times a Day Before Meals & at Bedtime.             levothyroxine (SYNTHROID, LEVOTHROID) 150 MCG tablet  Take 150 mcg by mouth Daily.             lithium (ESKALITH) 450 MG CR tablet  Take 450 mg by mouth Every Night.             Multiple Vitamin (MULTI-DAY PO)  Take 1 tablet by mouth Daily.             potassium chloride (K-DUR,KLOR-CON) 10 MEQ CR tablet  Take 1 tablet by mouth Daily.             simvastatin (ZOCOR) 40 MG tablet  Take 40 mg by mouth Every Night.             solifenacin (VESICARE) 10 MG tablet  Take 10 mg by mouth Daily.                 Discharge Diet:     Diet Instructions     Diet: Consistent Carbohydrate; Thin Liquids, No Restrictions       Discharge Diet:  Consistent Carbohydrate   Fluid Consistency:  Thin Liquids, No Restrictions                 Activity at Discharge:     Activity Instructions     Discharge Activity       As tolerates  PT/OT evaluation at rehab                 Follow-up Appointments:    Future Appointments  Date Time Provider Department Center   10/6/2017 11:00 AM MD LISS Tyson None     Wound care daily- Clean LLE with wound cleanser, pat dry, apply non adherent dressing, gauze wrap and ace wrap.    Test Results Pending at Discharge:           LAURENCE Eubanks  06/14/17  2:51 PM    Time: 35   minutes were spent reviewing labs, history, evaluating patient and discharge planning.

## 2017-06-14 NOTE — PROGRESS NOTES
Continued Stay Note  TONY Whitley     Patient Name: Caty Garcia  MRN: [id number]  Today's Date: 6/14/2017    Admit Date: 6/8/2017          Discharge Plan       06/14/17 1425    Case Management/Social Work Plan    Plan Received call from Jessenia at [facility], pt has been approved  for rehab. Called to Daysi at [facility] bed director not available will have to call CM back.                 Discharge Codes     None        Expected Discharge Date and Time     Expected Discharge Date Expected Discharge Time    Jun 13, 2017             Penny Samuels

## 2017-06-14 NOTE — PLAN OF CARE
Problem: Fall Risk (Adult)  Goal: Absence of Falls  Outcome: Ongoing (interventions implemented as appropriate)    06/14/17 0107   Fall Risk (Adult)   Absence of Falls making progress toward outcome         Problem: Infection, Risk/Actual (Adult)  Goal: Infection Prevention/Resolution  Outcome: Ongoing (interventions implemented as appropriate)    06/14/17 0107   Infection, Risk/Actual (Adult)   Infection Prevention/Resolution making progress toward outcome

## 2017-06-15 LAB — GLUCOSE BLDC GLUCOMTR-MCNC: 140 MG/DL (ref 70–130)

## 2017-07-03 ENCOUNTER — OFFICE VISIT (OUTPATIENT)
Dept: FAMILY MEDICINE CLINIC | Facility: CLINIC | Age: 57
End: 2017-07-03

## 2017-07-03 VITALS
DIASTOLIC BLOOD PRESSURE: 68 MMHG | OXYGEN SATURATION: 97 % | SYSTOLIC BLOOD PRESSURE: 145 MMHG | HEART RATE: 68 BPM | TEMPERATURE: 99 F | HEIGHT: 68 IN

## 2017-07-03 DIAGNOSIS — L03.116 CELLULITIS OF LEFT LOWER EXTREMITY: Primary | ICD-10-CM

## 2017-07-03 PROCEDURE — 99213 OFFICE O/P EST LOW 20 MIN: CPT | Performed by: INTERNAL MEDICINE

## 2017-07-03 RX ORDER — HYDROXYZINE HYDROCHLORIDE 25 MG/1
50 TABLET, FILM COATED ORAL NIGHTLY PRN
COMMUNITY

## 2017-07-04 RX ORDER — FUROSEMIDE 20 MG/1
40 TABLET ORAL DAILY
Qty: 60 TABLET | Refills: 3 | Status: SHIPPED | OUTPATIENT
Start: 2017-07-04

## 2017-07-04 NOTE — PROGRESS NOTES
Subjective   Caty Garcia is a 57 y.o. female.     Chief Complaint   Patient presents with   • Cellulitis     follow up; patient stated she is doing much better       History of Present Illness   Patient comes in f/u after hospital discharge. She reports marked improvement of left foot cellulitis.    The following portions of the patient's history were reviewed and updated as appropriate: allergies, current medications, past medical history, past social history, past surgical history and problem list.    Past Medical History:   Diagnosis Date   • Arthritis    • Bipolar 1 disorder    • Diabetes mellitus    • Disease of thyroid gland    • High blood pressure    • High cholesterol    • Migraine    • Sleep apnea        Past Surgical History:   Procedure Laterality Date   • CHOLECYSTECTOMY     • COLONOSCOPY     • GALLBLADDER SURGERY     • GASTRIC SLEEVE LAPAROSCOPIC     • INTERVENTIONAL RADIOLOGY PROCEDURE N/A 5/26/2017    Procedure: Abdominal Aortagram with Runoff;  Surgeon: Otf Mehta MD;  Location: [facility] INVASIVE LOC[facility];  Service:    • TOTAL HIP ARTHROPLASTY Right    • TOTAL HIP ARTHROPLASTY Left        Current Outpatient Prescriptions on File Prior to Visit   Medication Sig Dispense Refill   • ARIPiprazole (ABILIFY) 20 MG tablet Take 30 mg by mouth Every Night.     • buPROPion SR (WELLBUTRIN SR) 150 MG 12 hr tablet Take 150 mg by mouth Daily.     • carvedilol (COREG) 6.25 MG tablet Take 25 mg by mouth 2 (Two) Times a Day With Meals.     • diltiaZEM CD (CARDIZEM CD) 240 MG 24 hr capsule Take 480 mg by mouth Daily.     • ergocalciferol (ERGOCALCIFEROL) 42175 UNITS capsule Take 50,000 Units by mouth 1 (One) Time Per Week.     • glipiZIDE (GLUCOTROL) 10 MG tablet Take 10 mg by mouth 2 (Two) Times a Day Before Meals.     • levothyroxine (SYNTHROID, LEVOTHROID) 150 MCG tablet Take 150 mcg by mouth Daily.     • lithium (ESKALITH) 450 MG CR tablet Take 450 mg by mouth Every Night.     • Multiple  "Vitamin (MULTI-DAY PO) Take 1 tablet by mouth Daily.     • potassium chloride (K-DUR,KLOR-CON) 10 MEQ CR tablet Take 1 tablet by mouth Daily.     • simvastatin (ZOCOR) 40 MG tablet Take 40 mg by mouth Every Night.     • solifenacin (VESICARE) 10 MG tablet Take 10 mg by mouth Daily.       No current facility-administered medications on file prior to visit.        Social History     Social History   • Marital status: Single     Spouse name: N/A   • Number of children: N/A   • Years of education: N/A     Occupational History   •  Unemployed     Social History Main Topics   • Smoking status: Never Smoker   • Smokeless tobacco: Not on file   • Alcohol use No   • Drug use: No   • Sexual activity: Defer     Other Topics Concern   • Not on file     Social History Narrative       Review of Systems   Constitutional: Negative for chills, fatigue and fever.   HENT: Negative for congestion, ear pain, rhinorrhea, sinus pressure and sore throat.    Eyes: Negative for visual disturbance.   Respiratory: Negative for cough, chest tightness, shortness of breath and wheezing.    Cardiovascular: Negative for chest pain, palpitations and leg swelling.   Gastrointestinal: Negative for abdominal pain, blood in stool, constipation, diarrhea, nausea and vomiting.   Endocrine: Negative for polydipsia and polyuria.   Genitourinary: Negative for dysuria and hematuria.   Musculoskeletal: Negative for back pain.   Skin:        Left lower extremity mild edema, erythema   Neurological: Negative for dizziness, light-headedness, numbness and headaches.   Psychiatric/Behavioral: Negative for dysphoric mood and sleep disturbance. The patient is not nervous/anxious.        Objective   Blood pressure 145/68, pulse 68, temperature 99 °F (37.2 °C), height 67.5\" (171.5 cm), SpO2 97 %.    Physical Exam   Constitutional: She is oriented to person, place, and time. She appears well-nourished. No distress.   HENT:   Head: Atraumatic.   Right Ear: External ear " normal.   Left Ear: External ear normal.   Eyes: Conjunctivae are normal. Right eye exhibits no discharge. Left eye exhibits no discharge.   Neck: Normal range of motion. Neck supple.   Cardiovascular: Normal rate and regular rhythm.    No murmur heard.  Pulmonary/Chest: Effort normal and breath sounds normal. She has no wheezes. She has no rales.   Abdominal: Soft. Bowel sounds are normal. She exhibits no distension. There is no tenderness.   Neurological: She is alert and oriented to person, place, and time.   Skin: She is not diaphoretic.   Mild left lower extremity erythema, minimal edema   Psychiatric: She has a normal mood and affect.   Nursing note and vitals reviewed.    Assessment/Plan   Caty was seen today for cellulitis.    Diagnoses and all orders for this visit:    Cellulitis of left lower extremity    Other orders  -     furosemide (LASIX) 20 MG tablet; Take 2 tablets by mouth Daily.      Patient has completed antibiotic therapy with good clinical response for treatment of osteomyelitis.  No further antibiotic therapy is indicated.           Return if symptoms worsen or fail to improve.    There are no Patient Instructions on file for this visit.

## 2017-07-06 ENCOUNTER — LAB REQUISITION (OUTPATIENT)
Dept: LAB | Facility: HOSPITAL | Age: 57
End: 2017-07-06

## 2017-07-06 DIAGNOSIS — E11.22 TYPE 2 DIABETES MELLITUS WITH DIABETIC CHRONIC KIDNEY DISEASE (HCC): ICD-10-CM

## 2017-07-06 LAB — HBA1C MFR BLD: 5.9 % (ref 3–6)

## 2017-07-06 PROCEDURE — 83036 HEMOGLOBIN GLYCOSYLATED A1C: CPT | Performed by: INTERNAL MEDICINE

## 2017-08-31 ENCOUNTER — HOSPITAL ENCOUNTER (OUTPATIENT)
Dept: DIABETES SERVICES | Facility: HOSPITAL | Age: 57
Discharge: HOME | End: 2017-08-31
Admitting: INTERNAL MEDICINE

## 2017-08-31 PROCEDURE — G0109 DIAB MANAGE TRN IND/GROUP: HCPCS

## 2017-10-06 ENCOUNTER — OFFICE VISIT (OUTPATIENT)
Dept: UROLOGY | Facility: CLINIC | Age: 57
End: 2017-10-06

## 2017-10-06 VITALS
HEIGHT: 68 IN | BODY MASS INDEX: 44.41 KG/M2 | OXYGEN SATURATION: 93 % | HEART RATE: 54 BPM | WEIGHT: 293 LBS | TEMPERATURE: 97.3 F

## 2017-10-06 DIAGNOSIS — N32.81 OVERACTIVE BLADDER: ICD-10-CM

## 2017-10-06 DIAGNOSIS — Z87.440 HX: UTI (URINARY TRACT INFECTION): Primary | ICD-10-CM

## 2017-10-06 LAB
BILIRUB BLD-MCNC: NEGATIVE MG/DL
CLARITY, POC: CLEAR
COLOR UR: YELLOW
GLUCOSE UR STRIP-MCNC: NEGATIVE MG/DL
KETONES UR QL: NEGATIVE
LEUKOCYTE EST, POC: ABNORMAL
NITRITE UR-MCNC: NEGATIVE MG/ML
PH UR: 5.5 [PH] (ref 5–8)
PROT UR STRIP-MCNC: NEGATIVE MG/DL
RBC # UR STRIP: NEGATIVE /UL
SP GR UR: 1.03 (ref 1–1.03)
UROBILINOGEN UR QL: NORMAL

## 2017-10-06 PROCEDURE — 51703 INSERT BLADDER CATH COMPLEX: CPT | Performed by: UROLOGY

## 2017-10-06 PROCEDURE — 81003 URINALYSIS AUTO W/O SCOPE: CPT | Performed by: UROLOGY

## 2017-10-06 PROCEDURE — 99213 OFFICE O/P EST LOW 20 MIN: CPT | Performed by: UROLOGY

## 2017-10-06 NOTE — PROGRESS NOTES
Chief Complaint  Annual Exam (PATIENT IS HERE FOR A YEARLY FOLLOW UP HX OF UTI'S )      HPI  Caty Garcia is a 57 y.o.female who returns today for annual checkup with a previous history of urinary tract infection with pyonephrosis requiring drainage with a stent.  Some of her infections are from contaminated voided specimens.  For example today her voided urine appears infected but a catheter specimen is clear.  She has only 1 ounce of postvoid residual indicating no problems with retention.  She does have overactive bladder and takes both Ditropan and Vesicare.    Vitals:    10/06/17 1136   Pulse: 54   Temp: 97.3 °F (36.3 °C)   SpO2: 93%       Past Medical History  Past Medical History:   Diagnosis Date   • Arthritis    • Bipolar 1 disorder    • Diabetes mellitus    • Disease of thyroid gland    • High blood pressure    • High cholesterol    • Migraine    • Sleep apnea        Past Surgical History  Past Surgical History:   Procedure Laterality Date   • CHOLECYSTECTOMY     • COLONOSCOPY     • GALLBLADDER SURGERY     • GASTRIC SLEEVE LAPAROSCOPIC     • INTERVENTIONAL RADIOLOGY PROCEDURE N/A 5/26/2017    Procedure: Abdominal Aortagram with Runoff;  Surgeon: Otf Mehta MD;  Location: [facility] INVASIVE LOC[facility];  Service:    • TOTAL HIP ARTHROPLASTY Right    • TOTAL HIP ARTHROPLASTY Left        Medications  has a current medication list which includes the following prescription(s): aripiprazole, bupropion sr, carvedilol, diltiazem cd, ergocalciferol, furosemide, glipizide, hydroxyzine, levothyroxine, lithium, multiple vitamin, potassium chloride, simvastatin, and solifenacin.    Allergies  Allergies   Allergen Reactions   • Allegra [Fexofenadine]    • Lamotrigine    • Nortriptyline    • Prozac [Fluoxetine]    • Trazodone    • Trazodone And Nefazodone        Social History  Social History     Social History   • Marital status: Single     Spouse name: N/A   • Number of children: N/A   • Years of  education: N/A     Occupational History   •  Unemployed     Social History Main Topics   • Smoking status: Never Smoker   • Smokeless tobacco: Not on file   • Alcohol use No   • Drug use: No   • Sexual activity: Defer     Other Topics Concern   • Not on file     Social History Narrative       Family History  Family History   Problem Relation Age of Onset   • Hypertension Father    • Hyperlipidemia Father    • Diabetes Father    • Heart attack Father    • Liver cancer Father        Review of Systems  Review of Systems    Physical Exam  Physical Exam   Constitutional: She is oriented to person, place, and time. She appears well-developed and well-nourished.   HENT:   Head: Normocephalic.   Eyes: EOM are normal. Pupils are equal, round, and reactive to light.   Neck: Normal range of motion. Neck supple.   Cardiovascular: Normal rate, regular rhythm and normal heart sounds.    Pulmonary/Chest: Effort normal.   Abdominal: Soft. Bowel sounds are normal.   Genitourinary:         Neurological: She is alert and oriented to person, place, and time.   Skin: Skin is warm and dry.   Psychiatric: She has a normal mood and affect.       Labs recent and today in the office:  Results for orders placed or performed in visit on 10/06/17   POC Urinalysis Dipstick, Automated   Result Value Ref Range    Color Yellow Yellow, Straw, Dark Yellow, Demi    Clarity, UA Clear Clear    Glucose, UA Negative Negative, 1000 mg/dL (3+) mg/dL    Bilirubin Negative Negative    Ketones, UA Negative Negative    Specific Gravity  1.030 1.005 - 1.030    Blood, UA Negative Negative    pH, Urine 5.5 5.0 - 8.0    Protein, POC Negative Negative mg/dL    Urobilinogen, UA Normal Normal    Leukocytes 75 Lucian/ul (A) Negative    Nitrite, UA Negative Negative         Assessment & Plan  Her morbid obesity causes voided urine samples to appear infected since his contaminated with vaginal fluid but her catheter specimen is clear with no signs of infection.  She'll  continue the anticholinergic medication for her overactive bladder.

## 2018-01-04 ENCOUNTER — LAB (OUTPATIENT)
Dept: LAB | Facility: HOSPITAL | Age: 58
End: 2018-01-04

## 2018-01-04 ENCOUNTER — TRANSCRIBE ORDERS (OUTPATIENT)
Dept: LAB | Facility: HOSPITAL | Age: 58
End: 2018-01-04

## 2018-01-04 DIAGNOSIS — E11.9 DIABETES MELLITUS WITHOUT COMPLICATION (HCC): ICD-10-CM

## 2018-01-04 DIAGNOSIS — E78.49 FAMILIAL COMBINED HYPERLIPIDEMIA: ICD-10-CM

## 2018-01-04 DIAGNOSIS — E03.9 MYXEDEMA HEART DISEASE: ICD-10-CM

## 2018-01-04 DIAGNOSIS — N18.30 CHRONIC KIDNEY DISEASE, STAGE III (MODERATE) (HCC): ICD-10-CM

## 2018-01-04 DIAGNOSIS — R53.83 TIREDNESS: Primary | ICD-10-CM

## 2018-01-04 DIAGNOSIS — I51.9 MYXEDEMA HEART DISEASE: ICD-10-CM

## 2018-01-04 DIAGNOSIS — R53.83 TIREDNESS: ICD-10-CM

## 2018-01-04 DIAGNOSIS — Z79.899 NEED FOR PROPHYLACTIC CHEMOTHERAPY: Primary | ICD-10-CM

## 2018-01-04 DIAGNOSIS — Z79.899 NEED FOR PROPHYLACTIC CHEMOTHERAPY: ICD-10-CM

## 2018-01-04 DIAGNOSIS — N18.30 CHRONIC KIDNEY DISEASE, STAGE III (MODERATE) (HCC): Primary | ICD-10-CM

## 2018-01-04 LAB
ALBUMIN SERPL-MCNC: 4.3 G/DL (ref 3.5–5)
ALBUMIN/GLOB SERPL: 1.6 G/DL (ref 1–2)
ALP SERPL-CCNC: 100 U/L (ref 38–126)
ALT SERPL W P-5'-P-CCNC: 110 U/L (ref 13–69)
ANION GAP SERPL CALCULATED.3IONS-SCNC: 13.7 MMOL/L
AST SERPL-CCNC: 50 U/L (ref 15–46)
BILIRUB SERPL-MCNC: 0.4 MG/DL (ref 0.2–1.3)
BUN BLD-MCNC: 17 MG/DL (ref 7–20)
BUN/CREAT SERPL: 15.5 (ref 7.1–23.5)
CALCIUM SPEC-SCNC: 9.5 MG/DL (ref 8.4–10.2)
CHLORIDE SERPL-SCNC: 110 MMOL/L (ref 98–107)
CHOLEST SERPL-MCNC: 140 MG/DL (ref 0–199)
CO2 SERPL-SCNC: 23 MMOL/L (ref 26–30)
CREAT BLD-MCNC: 1.1 MG/DL (ref 0.6–1.3)
GFR SERPL CREATININE-BSD FRML MDRD: 51 ML/MIN/1.73
GLOBULIN UR ELPH-MCNC: 2.7 GM/DL
GLUCOSE BLD-MCNC: 95 MG/DL (ref 74–98)
HBA1C MFR BLD: 6.1 % (ref 3–6)
HDLC SERPL-MCNC: 60 MG/DL (ref 40–60)
LDLC SERPL CALC-MCNC: 52 MG/DL (ref 0–99)
LDLC/HDLC SERPL: 0.87 {RATIO}
PHOSPHATE SERPL-MCNC: 3.9 MG/DL (ref 2.5–4.5)
POTASSIUM BLD-SCNC: 3.7 MMOL/L (ref 3.5–5.1)
PROT SERPL-MCNC: 7 G/DL (ref 6.3–8.2)
SODIUM BLD-SCNC: 143 MMOL/L (ref 137–145)
TRIGL SERPL-MCNC: 139 MG/DL
TSH SERPL DL<=0.05 MIU/L-ACNC: 1.62 MIU/ML (ref 0.47–4.68)
URATE SERPL-MCNC: 8 MG/DL (ref 2.5–8.5)
VLDLC SERPL-MCNC: 27.8 MG/DL

## 2018-01-04 PROCEDURE — 80061 LIPID PANEL: CPT

## 2018-01-04 PROCEDURE — 80053 COMPREHEN METABOLIC PANEL: CPT

## 2018-01-04 PROCEDURE — 83970 ASSAY OF PARATHORMONE: CPT

## 2018-01-04 PROCEDURE — 84550 ASSAY OF BLOOD/URIC ACID: CPT

## 2018-01-04 PROCEDURE — 80178 ASSAY OF LITHIUM: CPT

## 2018-01-04 PROCEDURE — 84443 ASSAY THYROID STIM HORMONE: CPT

## 2018-01-04 PROCEDURE — 83036 HEMOGLOBIN GLYCOSYLATED A1C: CPT

## 2018-01-04 PROCEDURE — 36415 COLL VENOUS BLD VENIPUNCTURE: CPT

## 2018-01-04 PROCEDURE — 84100 ASSAY OF PHOSPHORUS: CPT

## 2018-01-05 LAB
LITHIUM SERPL-SCNC: 0.7 MMOL/L (ref 0.6–1.2)
PTH-INTACT SERPL-MCNC: 40 PG/ML (ref 15–65)

## 2018-01-08 ENCOUNTER — TRANSCRIBE ORDERS (OUTPATIENT)
Dept: MAMMOGRAPHY | Facility: HOSPITAL | Age: 58
End: 2018-01-08

## 2018-01-08 DIAGNOSIS — Z12.39 BREAST CANCER SCREENING: Primary | ICD-10-CM

## 2018-01-16 ENCOUNTER — PREP FOR SURGERY (OUTPATIENT)
Dept: OTHER | Facility: HOSPITAL | Age: 58
End: 2018-01-16

## 2018-01-16 ENCOUNTER — OFFICE VISIT (OUTPATIENT)
Dept: GASTROENTEROLOGY | Facility: CLINIC | Age: 58
End: 2018-01-16

## 2018-01-16 VITALS
TEMPERATURE: 97 F | BODY MASS INDEX: 44.41 KG/M2 | HEART RATE: 60 BPM | DIASTOLIC BLOOD PRESSURE: 83 MMHG | RESPIRATION RATE: 18 BRPM | WEIGHT: 293 LBS | HEIGHT: 68 IN | SYSTOLIC BLOOD PRESSURE: 133 MMHG

## 2018-01-16 DIAGNOSIS — Z12.11 COLON CANCER SCREENING: Primary | ICD-10-CM

## 2018-01-16 PROBLEM — E87.6 HYPOKALEMIA: Status: ACTIVE | Noted: 2017-06-24

## 2018-01-16 PROBLEM — E66.01 MORBID OBESITY (HCC): Status: ACTIVE | Noted: 2017-06-15

## 2018-01-16 PROBLEM — R53.81 DECLINING FUNCTIONAL STATUS: Status: ACTIVE | Noted: 2017-06-15

## 2018-01-16 PROCEDURE — S0260 H&P FOR SURGERY: HCPCS | Performed by: NURSE PRACTITIONER

## 2018-01-16 RX ORDER — ARIPIPRAZOLE 30 MG/1
TABLET ORAL
Refills: 0 | COMMUNITY
Start: 2018-01-08

## 2018-01-16 RX ORDER — SODIUM CHLORIDE 9 MG/ML
70 INJECTION, SOLUTION INTRAVENOUS CONTINUOUS PRN
Status: CANCELLED | OUTPATIENT
Start: 2018-01-16

## 2018-01-16 RX ORDER — OXYBUTYNIN CHLORIDE 15 MG/1
TABLET, EXTENDED RELEASE ORAL
Refills: 2 | COMMUNITY
Start: 2017-12-26 | End: 2018-09-04 | Stop reason: ALTCHOICE

## 2018-01-16 RX ORDER — CARVEDILOL 25 MG/1
TABLET ORAL
Refills: 1 | COMMUNITY
Start: 2017-12-27

## 2018-01-16 NOTE — PROGRESS NOTES
Chief Complaint   Patient presents with   • Colon Cancer Screening     HPI     The patient denies recent change in bowel habits. There is no diarrhea or constipation. There is no history of abdominal pain. There is no history of overt GI bleed (hematemesis melena or hematochezia). The patient denies nausea or vomiting. There is no history of reflux. The patient denies dysphagia or odynophagia. There is no history of recent significant weight loss. There is no history of liver disease in the past. There is a family history of colon cancer in the patient's grandfather. The patient's last colonoscopy was in 2014 with polyps.    Review of Systems   Constitutional: Negative for appetite change, chills, fatigue, fever and unexpected weight change.   HENT: Negative for mouth sores, nosebleeds and trouble swallowing.    Eyes: Negative for discharge and redness.   Respiratory: Positive for apnea. Negative for cough and shortness of breath.    Cardiovascular: Negative for chest pain, palpitations and leg swelling.   Gastrointestinal: Negative for abdominal distention, abdominal pain, anal bleeding, blood in stool, constipation, diarrhea, nausea and vomiting.   Endocrine: Negative for cold intolerance, heat intolerance and polydipsia.   Genitourinary: Negative for dysuria, hematuria and urgency.   Musculoskeletal: Positive for arthralgias. Negative for joint swelling and myalgias.   Skin: Negative for rash.   Allergic/Immunologic: Negative for food allergies and immunocompromised state.   Neurological: Negative for dizziness, seizures, syncope and headaches.   Hematological: Negative for adenopathy. Does not bruise/bleed easily.   Psychiatric/Behavioral: Negative for dysphoric mood. The patient is not nervous/anxious and is not hyperactive.      Patient Active Problem List   Diagnosis   • Rash   • Back pain   • Mood changes   • Bipolar disorder   • Acute renal failure   • Cellulitis of left lower extremity   • Chronic venous  insufficiency   • Essential hypertension   • Acquired hypothyroidism   • Dyslipidemia   • Type 2 diabetes mellitus without complication, without long-term current use of insulin   • Venous stasis dermatitis of both lower extremities   • Overactive bladder   • GERD with esophagitis   • Cellulitis   • Morbid obesity with BMI of 60.0-69.9, adult   • Declining functional status   • Hypokalemia   • Morbid obesity     Past Medical History:   Diagnosis Date   • Arthritis    • Bipolar 1 disorder    • Colon polyp 2014   • Diabetes mellitus    • Disease of thyroid gland    • High blood pressure    • High cholesterol    • Hypothyroid    • Migraine    • Sleep apnea      Past Surgical History:   Procedure Laterality Date   • CHOLECYSTECTOMY  2007   • COLONOSCOPY  2014   • GALLBLADDER SURGERY     • GASTRIC SLEEVE LAPAROSCOPIC  2014   • INTERVENTIONAL RADIOLOGY PROCEDURE N/A 5/26/2017    Procedure: Abdominal Aortagram with Runoff;  Surgeon: Otf Mehta MD;  Location: [facility] INVASIVE LOCATION;  Service:    • TOTAL HIP ARTHROPLASTY Right 2008   • TOTAL HIP ARTHROPLASTY Left 2015   • UPPER GASTROINTESTINAL ENDOSCOPY       Family History   Problem Relation Age of Onset   • Hypertension Father    • Hyperlipidemia Father    • Diabetes Father    • Heart attack Father    • Liver cancer Father    • Diabetes Brother    • Colon cancer Maternal Grandfather      Social History   Substance Use Topics   • Smoking status: Never Smoker   • Smokeless tobacco: Never Used   • Alcohol use No       Current Outpatient Prescriptions:   •  ARIPiprazole (ABILIFY) 30 MG tablet, TAKE ONE (1) TABLET BY MOUTH AT BEDTIME, Disp: , Rfl: 0  •  buPROPion SR (WELLBUTRIN SR) 150 MG 12 hr tablet, Take 150 mg by mouth Daily., Disp: , Rfl:   •  carvedilol (COREG) 25 MG tablet, TAKE 1 TABLET BY MOUTH TWICE A DAY, Disp: , Rfl: 1  •  diltiaZEM CD (CARDIZEM CD) 240 MG 24 hr capsule, Take 480 mg by mouth Daily., Disp: , Rfl:   •  ergocalciferol  "(ERGOCALCIFEROL) 62055 UNITS capsule, Take 50,000 Units by mouth 1 (One) Time Per Week., Disp: , Rfl:   •  glipiZIDE (GLUCOTROL) 10 MG tablet, Take 10 mg by mouth 2 (Two) Times a Day Before Meals., Disp: , Rfl:   •  hydrOXYzine (ATARAX) 25 MG tablet, Take 25 mg by mouth 3 (Three) Times a Day As Needed for Itching., Disp: , Rfl:   •  levothyroxine (SYNTHROID, LEVOTHROID) 150 MCG tablet, Take 150 mcg by mouth Daily., Disp: , Rfl:   •  lithium (ESKALITH) 450 MG CR tablet, Take 450 mg by mouth Every Night., Disp: , Rfl:   •  Multiple Vitamin (MULTI-DAY PO), Take 1 tablet by mouth Daily., Disp: , Rfl:   •  nystatin (MYCOSTATIN) 197202 UNIT/ML suspension, SWISH AND SWALLOW 5 MILLILITERS BY MOUTH 4 TIMES DAILY., Disp: , Rfl: 0  •  oxybutynin XL (DITROPAN XL) 15 MG 24 hr tablet, TAKE 1 TABLET BY MOUTH AT BEDTIME, Disp: , Rfl: 2  •  simvastatin (ZOCOR) 40 MG tablet, Take 40 mg by mouth Every Night., Disp: , Rfl:   •  solifenacin (VESICARE) 10 MG tablet, Take 10 mg by mouth Daily., Disp: , Rfl:   •  furosemide (LASIX) 20 MG tablet, Take 2 tablets by mouth Daily., Disp: 60 tablet, Rfl: 3  •  potassium chloride (K-DUR,KLOR-CON) 10 MEQ CR tablet, Take 1 tablet by mouth Daily., Disp: , Rfl:     Allergies   Allergen Reactions   • Allegra [Fexofenadine]    • Fluoxetine Hcl    • Lamotrigine    • Nortriptyline    • Prozac [Fluoxetine]    • Trazodone    • Trazodone And Nefazodone      /83  Pulse 60  Temp 97 °F (36.1 °C)  Resp 18  Ht 172.7 cm (67.99\")  Wt (!) 171 kg (378 lb)  BMI 57.49 kg/m2    Physical Exam   Constitutional: She is oriented to person, place, and time. She appears well-developed and well-nourished. No distress.   HENT:   Head: Normocephalic and atraumatic.   Right Ear: Hearing and external ear normal.   Left Ear: Hearing and external ear normal.   Nose: Nose normal.   Mouth/Throat: Oropharynx is clear and moist and mucous membranes are normal. Mucous membranes are not pale, not dry and not cyanotic. No oral " lesions. No oropharyngeal exudate.   Eyes: Conjunctivae and EOM are normal. Right eye exhibits no discharge. Left eye exhibits no discharge.   Neck: Trachea normal. Neck supple. No JVD present. No edema present. No thyroid mass and no thyromegaly present.   Cardiovascular: Normal rate, regular rhythm, S2 normal and normal heart sounds.  Exam reveals no gallop, no S3 and no friction rub.    No murmur heard.  Pulmonary/Chest: Effort normal and breath sounds normal. No respiratory distress. She exhibits no tenderness.   Abdominal: Normal appearance and bowel sounds are normal. She exhibits no distension, no ascites and no mass. There is no splenomegaly or hepatomegaly. There is no tenderness. There is no rigidity, no rebound and no guarding. No hernia.       Vascular Status -  Her exam exhibits no right foot edema. Her exam exhibits no left foot edema.  Lymphadenopathy:     She has no cervical adenopathy.        Left: No supraclavicular adenopathy present.   Neurological: She is alert and oriented to person, place, and time. She has normal strength. No cranial nerve deficit or sensory deficit.   Skin: No rash noted. She is not diaphoretic. No cyanosis. No pallor. Nails show no clubbing.   Psychiatric: She has a normal mood and affect.   Nursing note and vitals reviewed.  Stigmata of chronic liver disease:  None.  Asterixis:  None.    Laboratory Tests:   Upon review of records:     Dated 10/3/2017 sodium 145 potassium 4.4 chloride 112 CO2 24 glucose 101 BUN 40 creatinine 1.6 calcium 9.3 phosphorous 4.6 albumin 3.8 WBC 9.4 hemoglobin 12.4 hematocrit 39.0 platelet count 307 MCV 90.9 TSH 3.770 vitamin D 33 hemoglobin A1c 6.4    Procedures:    Upon review of records:    Colonoscopy dated 2/26/2014 reveals scant early diverticular change involving the left colon and ascending colon.  Scant vascular ectasia.  Colon polyp.  Internal hemorrhoids.  Rectal polyp biopsy reveals hyperplastic polyp.    Assessment and Plan:     Caty was seen today for colon cancer screening.    Diagnoses and all orders for this visit:    Colon cancer screening  Comments:  Last colonoscopy was in 2014. There is a family history of colon cancer in the patient's grandfather.        Plan  and Patient Instructions:  Patient Instructions   1. Colonoscopy: Description of the procedure, risks, benefits, alternatives and options, including nonoperative options, were discussed with the patient in detail. The patient understands and wishes to proceed.    Kenn Orellana, APRN

## 2018-01-17 PROBLEM — Z12.11 COLON CANCER SCREENING: Status: ACTIVE | Noted: 2018-01-17

## 2018-01-31 ENCOUNTER — HOSPITAL ENCOUNTER (OUTPATIENT)
Dept: MAMMOGRAPHY | Facility: HOSPITAL | Age: 58
Discharge: HOME | End: 2018-01-31
Admitting: INTERNAL MEDICINE

## 2018-01-31 DIAGNOSIS — Z12.39 BREAST CANCER SCREENING: ICD-10-CM

## 2018-01-31 PROCEDURE — 77063 BREAST TOMOSYNTHESIS BI: CPT

## 2018-01-31 PROCEDURE — 77067 SCR MAMMO BI INCL CAD: CPT

## 2018-02-05 ENCOUNTER — APPOINTMENT (OUTPATIENT)
Dept: LAB | Facility: HOSPITAL | Age: 58
End: 2018-02-05

## 2018-02-05 ENCOUNTER — TRANSCRIBE ORDERS (OUTPATIENT)
Dept: ADMINISTRATIVE | Facility: HOSPITAL | Age: 58
End: 2018-02-05

## 2018-02-05 DIAGNOSIS — R74.8 ACID PHOSPHATASE ELEVATED: ICD-10-CM

## 2018-02-05 DIAGNOSIS — R53.83 FATIGUE, UNSPECIFIED TYPE: Primary | ICD-10-CM

## 2018-02-05 LAB
ALBUMIN SERPL-MCNC: 4.4 G/DL (ref 3.5–5)
ALBUMIN/GLOB SERPL: 1.5 G/DL (ref 1–2)
ALP SERPL-CCNC: 99 U/L (ref 38–126)
ALT SERPL W P-5'-P-CCNC: 69 U/L (ref 13–69)
ANION GAP SERPL CALCULATED.3IONS-SCNC: 17 MMOL/L
AST SERPL-CCNC: 33 U/L (ref 15–46)
BILIRUB SERPL-MCNC: 0.5 MG/DL (ref 0.2–1.3)
BUN BLD-MCNC: 19 MG/DL (ref 7–20)
BUN/CREAT SERPL: 14.6 (ref 7.1–23.5)
CALCIUM SPEC-SCNC: 9.5 MG/DL (ref 8.4–10.2)
CHLORIDE SERPL-SCNC: 110 MMOL/L (ref 98–107)
CO2 SERPL-SCNC: 24 MMOL/L (ref 26–30)
CREAT BLD-MCNC: 1.3 MG/DL (ref 0.6–1.3)
GFR SERPL CREATININE-BSD FRML MDRD: 42 ML/MIN/1.73
GGT SERPL-CCNC: 20 U/L (ref 12–58)
GLOBULIN UR ELPH-MCNC: 3 GM/DL
GLUCOSE BLD-MCNC: 123 MG/DL (ref 74–98)
POTASSIUM BLD-SCNC: 4 MMOL/L (ref 3.5–5.1)
PROT SERPL-MCNC: 7.4 G/DL (ref 6.3–8.2)
SODIUM BLD-SCNC: 147 MMOL/L (ref 137–145)

## 2018-02-05 PROCEDURE — 80053 COMPREHEN METABOLIC PANEL: CPT | Performed by: INTERNAL MEDICINE

## 2018-02-05 PROCEDURE — 36415 COLL VENOUS BLD VENIPUNCTURE: CPT | Performed by: INTERNAL MEDICINE

## 2018-02-05 PROCEDURE — 82977 ASSAY OF GGT: CPT | Performed by: INTERNAL MEDICINE

## 2018-02-20 ENCOUNTER — ANESTHESIA (OUTPATIENT)
Dept: GASTROENTEROLOGY | Facility: HOSPITAL | Age: 58
End: 2018-02-20

## 2018-02-20 ENCOUNTER — ANESTHESIA EVENT (OUTPATIENT)
Dept: GASTROENTEROLOGY | Facility: HOSPITAL | Age: 58
End: 2018-02-20

## 2018-02-20 ENCOUNTER — HOSPITAL ENCOUNTER (OUTPATIENT)
Facility: HOSPITAL | Age: 58
Setting detail: HOSPITAL OUTPATIENT SURGERY
Discharge: HOME | End: 2018-02-20
Attending: INTERNAL MEDICINE | Admitting: INTERNAL MEDICINE

## 2018-02-20 VITALS
OXYGEN SATURATION: 95 % | WEIGHT: 293 LBS | HEART RATE: 57 BPM | DIASTOLIC BLOOD PRESSURE: 71 MMHG | BODY MASS INDEX: 44.41 KG/M2 | RESPIRATION RATE: 20 BRPM | HEIGHT: 68 IN | TEMPERATURE: 98.6 F | SYSTOLIC BLOOD PRESSURE: 132 MMHG

## 2018-02-20 DIAGNOSIS — Z12.11 COLON CANCER SCREENING: ICD-10-CM

## 2018-02-20 LAB — GLUCOSE BLDC GLUCOMTR-MCNC: 112 MG/DL (ref 70–130)

## 2018-02-20 PROCEDURE — 45380 COLONOSCOPY AND BIOPSY: CPT | Performed by: INTERNAL MEDICINE

## 2018-02-20 PROCEDURE — 88305 TISSUE EXAM BY PATHOLOGIST: CPT | Performed by: INTERNAL MEDICINE

## 2018-02-20 PROCEDURE — 25010000002 PROPOFOL 200 MG/20ML EMULSION: Performed by: NURSE ANESTHETIST, CERTIFIED REGISTERED

## 2018-02-20 PROCEDURE — 45385 COLONOSCOPY W/LESION REMOVAL: CPT | Performed by: INTERNAL MEDICINE

## 2018-02-20 PROCEDURE — 45381 COLONOSCOPY SUBMUCOUS NJX: CPT | Performed by: INTERNAL MEDICINE

## 2018-02-20 PROCEDURE — 25010000002 ONDANSETRON PER 1 MG: Performed by: NURSE ANESTHETIST, CERTIFIED REGISTERED

## 2018-02-20 PROCEDURE — S0260 H&P FOR SURGERY: HCPCS | Performed by: INTERNAL MEDICINE

## 2018-02-20 PROCEDURE — 82962 GLUCOSE BLOOD TEST: CPT

## 2018-02-20 RX ORDER — PROPOFOL 10 MG/ML
INJECTION, EMULSION INTRAVENOUS AS NEEDED
Status: DISCONTINUED | OUTPATIENT
Start: 2018-02-20 | End: 2018-02-20 | Stop reason: SURG

## 2018-02-20 RX ORDER — ONDANSETRON 2 MG/ML
INJECTION INTRAMUSCULAR; INTRAVENOUS AS NEEDED
Status: DISCONTINUED | OUTPATIENT
Start: 2018-02-20 | End: 2018-02-20 | Stop reason: SURG

## 2018-02-20 RX ORDER — SODIUM CHLORIDE 9 MG/ML
70 INJECTION, SOLUTION INTRAVENOUS CONTINUOUS PRN
Status: DISCONTINUED | OUTPATIENT
Start: 2018-02-20 | End: 2018-02-20 | Stop reason: HOSPADM

## 2018-02-20 RX ADMIN — LIDOCAINE HYDROCHLORIDE 60 MG: 20 INJECTION, SOLUTION INTRAVENOUS at 09:25

## 2018-02-20 RX ADMIN — PROPOFOL 50 MG: 10 INJECTION, EMULSION INTRAVENOUS at 09:50

## 2018-02-20 RX ADMIN — PROPOFOL 50 MG: 10 INJECTION, EMULSION INTRAVENOUS at 09:55

## 2018-02-20 RX ADMIN — PROPOFOL 50 MG: 10 INJECTION, EMULSION INTRAVENOUS at 09:46

## 2018-02-20 RX ADMIN — PROPOFOL 50 MG: 10 INJECTION, EMULSION INTRAVENOUS at 09:33

## 2018-02-20 RX ADMIN — ONDANSETRON 4 MG: 2 INJECTION INTRAMUSCULAR; INTRAVENOUS at 09:25

## 2018-02-20 RX ADMIN — PROPOFOL 50 MG: 10 INJECTION, EMULSION INTRAVENOUS at 09:30

## 2018-02-20 RX ADMIN — PROPOFOL 50 MG: 10 INJECTION, EMULSION INTRAVENOUS at 09:35

## 2018-02-20 RX ADMIN — PROPOFOL 50 MG: 10 INJECTION, EMULSION INTRAVENOUS at 09:43

## 2018-02-20 RX ADMIN — SODIUM CHLORIDE 70 ML/HR: 9 INJECTION, SOLUTION INTRAVENOUS at 08:45

## 2018-02-20 RX ADMIN — PROPOFOL 50 MG: 10 INJECTION, EMULSION INTRAVENOUS at 09:40

## 2018-02-20 RX ADMIN — PROPOFOL 50 MG: 10 INJECTION, EMULSION INTRAVENOUS at 10:10

## 2018-02-20 RX ADMIN — PROPOFOL 50 MG: 10 INJECTION, EMULSION INTRAVENOUS at 09:37

## 2018-02-20 RX ADMIN — PROPOFOL 50 MG: 10 INJECTION, EMULSION INTRAVENOUS at 10:15

## 2018-02-20 RX ADMIN — PROPOFOL 50 MG: 10 INJECTION, EMULSION INTRAVENOUS at 10:00

## 2018-02-20 RX ADMIN — PROPOFOL 50 MG: 10 INJECTION, EMULSION INTRAVENOUS at 10:05

## 2018-02-20 NOTE — ANESTHESIA PREPROCEDURE EVALUATION
Anesthesia Evaluation     Patient summary reviewed and Nursing notes reviewed   no history of anesthetic complications:  NPO Solid Status: > 8 hours  NPO Liquid Status: > 8 hours           Airway   Mallampati: II  TM distance: <3 FB  Neck ROM: full  possible difficult intubation  Dental - normal exam   (+) poor dentition    Pulmonary - normal exam   (+) sleep apnea,   Cardiovascular - normal exam    ECG reviewed  Rhythm: regular  Rate: normal    (+) hypertension well controlled, valvular problems/murmurs murmur, PVD, hyperlipidemia      Neuro/Psych  (+) headaches, psychiatric history Anxiety and Depression,     GI/Hepatic/Renal/Endo    (+) morbid obesity (BMI 57), diabetes mellitus (FSBS 112) type 2 well controlled, hypothyroidism,     Musculoskeletal     (+) back pain,   Abdominal  - normal exam  (+) obese,     Abdomen: soft.  Bowel sounds: normal.   Substance History      OB/GYN negative ob/gyn ROS         Other   (+) arthritis                     Anesthesia Plan    ASA 4     MAC   (Risks and benefits discussed including risk of aspiration, recall and dental damage. All patient questions answered. Will continue with POC.)  intravenous induction   Anesthetic plan and risks discussed with patient.

## 2018-02-20 NOTE — ANESTHESIA POSTPROCEDURE EVALUATION
Patient: Caty Garcia    Procedure Summary     Date Anesthesia Start Anesthesia Stop Room / Location    02/20/18 0925 1019 [facility] ENDOSCOPY 2 / [facility] ENDOSCOPY       Procedure Diagnosis Surgeon Provider    COLONOSCOPY with hot  snare polypectomies , biopsies, and submucosal injection katy ink (N/A Anus) Colon cancer screening  (Colon cancer screening [Z12.11]) MD Ta Briceno CRNA          Anesthesia Type: MAC  Last vitals  BP   132/71 (02/20/18 1057)   Temp   98.6 °F (37 °C) (02/20/18 1027)   Pulse   57 (02/20/18 1057)   Resp   20 (02/20/18 1057)     SpO2   95 % (02/20/18 1057)     Post Anesthesia Care and Evaluation    Patient location during evaluation: bedside  Patient participation: complete - patient participated  Level of consciousness: awake  Pain score: 0  Pain management: adequate  Airway patency: patent  Anesthetic complications: No anesthetic complications  PONV Status: controlled  Cardiovascular status: acceptable and stable  Respiratory status: acceptable and room air  Hydration status: acceptable

## 2018-02-26 ENCOUNTER — OFFICE VISIT (OUTPATIENT)
Dept: GASTROENTEROLOGY | Facility: CLINIC | Age: 58
End: 2018-02-26

## 2018-02-26 VITALS
SYSTOLIC BLOOD PRESSURE: 136 MMHG | HEIGHT: 68 IN | BODY MASS INDEX: 44.41 KG/M2 | RESPIRATION RATE: 20 BRPM | DIASTOLIC BLOOD PRESSURE: 88 MMHG | TEMPERATURE: 97 F | WEIGHT: 293 LBS | HEART RATE: 57 BPM

## 2018-02-26 DIAGNOSIS — D12.3 ADENOMATOUS POLYP OF TRANSVERSE COLON: ICD-10-CM

## 2018-02-26 DIAGNOSIS — D64.9 ANEMIA, UNSPECIFIED TYPE: ICD-10-CM

## 2018-02-26 DIAGNOSIS — K63.9 NODULE OF COLON: ICD-10-CM

## 2018-02-26 DIAGNOSIS — R19.7 DIARRHEA, UNSPECIFIED TYPE: Primary | ICD-10-CM

## 2018-02-26 DIAGNOSIS — K52.9 COLITIS: ICD-10-CM

## 2018-02-26 LAB
LAB AP CASE REPORT: NORMAL
Lab: NORMAL
PATH REPORT.FINAL DX SPEC: NORMAL

## 2018-02-26 PROCEDURE — 99214 OFFICE O/P EST MOD 30 MIN: CPT | Performed by: INTERNAL MEDICINE

## 2018-02-26 NOTE — PATIENT INSTRUCTIONS
1. Low-fat-low lactose-consistent carbohydrate diet.  2. Weight reduction.  3. The patient should continue to watch for worsening of diarrhea, bleeding or abdominal pain.  4. It may be prudent to repeat a colonoscopy in 6 months to a year's time to reevaluate the areas. The patient will call back.  5. Avoid NSAIDs.  6. Discussed with the patient in detail. Opportunity was given to ask questions.

## 2018-02-26 NOTE — PROGRESS NOTES
Chief Complaint   Patient presents with   • GI Problem       History of Present Illness     The patient came back for follow visit today. She feels okay.  The patient admits to episodes of diarrhea perhaps once or twice a month. The diarrhea is described as mild to moderate in severity. These episodes may last for 1 to 2 days.  During the episodes the patient may have 3-4 loose and at times watery stools a day. There is no nocturnal element of diarrhea. She denies tenesmus.  There is no history of overt GI bleed (Hematemesis, melena or hematochezia). the patient denies abdominal pain. There is no nausea or vomiting.  The patient denies reflux. There is no dysphagia or odynophagia. No recent weight loss. She denies fever or chills. There is no history of liver or pancreatic disease.      Review of Systems   Constitutional: Negative for appetite change, chills, fatigue, fever and unexpected weight change.   HENT: Negative for mouth sores, nosebleeds and trouble swallowing.    Eyes: Negative for discharge and redness.   Respiratory: Positive for apnea. Negative for cough and shortness of breath.    Cardiovascular: Negative for chest pain, palpitations and leg swelling.   Gastrointestinal: Negative for abdominal distention, abdominal pain, anal bleeding, blood in stool, constipation, diarrhea, nausea and vomiting.   Endocrine: Negative for cold intolerance, heat intolerance and polydipsia.   Genitourinary: Negative for dysuria, hematuria and urgency.   Musculoskeletal: Positive for arthralgias. Negative for joint swelling and myalgias.   Skin: Negative for rash.   Allergic/Immunologic: Negative for food allergies and immunocompromised state.   Neurological: Negative for dizziness, seizures, syncope and headaches.   Hematological: Negative for adenopathy. Does not bruise/bleed easily.   Psychiatric/Behavioral: Negative for dysphoric mood. The patient is not nervous/anxious and is not hyperactive.      Patient Active  Problem List   Diagnosis   • Rash   • Back pain   • Mood changes   • Bipolar disorder   • Acute renal failure   • Cellulitis of left lower extremity   • Chronic venous insufficiency   • Essential hypertension   • Acquired hypothyroidism   • Dyslipidemia   • Type 2 diabetes mellitus without complication, without long-term current use of insulin   • Venous stasis dermatitis of both lower extremities   • Overactive bladder   • GERD with esophagitis   • Cellulitis   • Morbid obesity with BMI of 60.0-69.9, adult   • Declining functional status   • Hypokalemia   • Morbid obesity   • Colon cancer screening     Past Medical History:   Diagnosis Date   • Anxiety and depression    • Arthritis     BOTHERSOM IN RIGHT SHOULDER   • Bipolar 1 disorder    • Blind     left eye   • Colon polyp 2014   • Diabetes mellitus    • Disease of thyroid gland    • Heart murmur    • High blood pressure    • High cholesterol    • History of nuclear stress test     REPORTS APX. 6 YEARS AGO AND THAT ALL WAS WNL'S   • Hypothyroid    • Migraine     REPORTS NONE FOR 10+ YEARS   • MRSA (methicillin resistant Staphylococcus aureus) 06/2017    REPORTS OF LEFT ANKLE AND THAT SHE WAS TREATED   • Sleep apnea 2011    USES BIPAP HS, TO BRING IN DOS   • Wears glasses      Past Surgical History:   Procedure Laterality Date   • CHOLECYSTECTOMY  2007   • COLONOSCOPY  2014   • COLONOSCOPY     • COLONOSCOPY N/A 2/20/2018    Procedure: COLONOSCOPY with hot  snare polypectomies , biopsies, and submucosal injection katy ink;  Surgeon: Shravan Tolliver MD;  Location: [facility] ENDOSCOPY;  Service:    • GASTRIC SLEEVE LAPAROSCOPIC  2014   • INTERVENTIONAL RADIOLOGY PROCEDURE N/A 5/26/2017    Procedure: Abdominal Aortagram with Runoff;  Surgeon: Otf Mehta MD;  Location: [facility] CATH INVASIVE LOCATION;  Service:    • JOINT REPLACEMENT Right 2008    HIP REPLACEMENT   • JOINT REPLACEMENT Left 2015    HIP REPLACEMENT   • SKIN BIOPSY Right     SHOULDER   • TOTAL HIP  ARTHROPLASTY Right 2008   • UPPER GASTROINTESTINAL ENDOSCOPY     • WISDOM TOOTH EXTRACTION       Family History   Problem Relation Age of Onset   • Hypertension Father    • Hyperlipidemia Father    • Diabetes Father    • Heart attack Father    • Liver cancer Father    • Diabetes Brother    • Colon cancer Maternal Grandfather    • Breast cancer Mother      Social History   Substance Use Topics   • Smoking status: Never Smoker   • Smokeless tobacco: Never Used   • Alcohol use No       Current Outpatient Prescriptions:   •  ARIPiprazole (ABILIFY) 30 MG tablet, TAKE ONE (1) TABLET BY MOUTH AT BEDTIME, Disp: , Rfl: 0  •  buPROPion SR (WELLBUTRIN SR) 150 MG 12 hr tablet, Take 150 mg by mouth Daily., Disp: , Rfl:   •  carvedilol (COREG) 25 MG tablet, TAKE 1 TABLET BY MOUTH TWICE A DAY, Disp: , Rfl: 1  •  diltiaZEM CD (CARDIZEM CD) 240 MG 24 hr capsule, Take 480 mg by mouth Daily., Disp: , Rfl:   •  ergocalciferol (ERGOCALCIFEROL) 53408 UNITS capsule, Take 50,000 Units by mouth 1 (One) Time Per Week. TAKES MEDICATION ON Sunday, Disp: , Rfl:   •  furosemide (LASIX) 20 MG tablet, Take 2 tablets by mouth Daily. (Patient taking differently: Take 40 mg by mouth Daily As Needed (MAY TAKE FOR SWELLING AS NEEDED AND CONTACT MD IF NEEDS TO TAKE).), Disp: 60 tablet, Rfl: 3  •  glipiZIDE (GLUCOTROL) 10 MG tablet, Take 10 mg by mouth 2 (Two) Times a Day Before Meals., Disp: , Rfl:   •  hydrOXYzine (ATARAX) 25 MG tablet, Take 50 mg by mouth Every Night., Disp: , Rfl:   •  levothyroxine (SYNTHROID, LEVOTHROID) 150 MCG tablet, Take 150 mcg by mouth Daily., Disp: , Rfl:   •  lithium (ESKALITH) 450 MG CR tablet, Take 450 mg by mouth Every Night., Disp: , Rfl:   •  Multiple Vitamin (MULTI-DAY PO), Take 1 tablet by mouth Daily., Disp: , Rfl:   •  oxybutynin XL (DITROPAN XL) 15 MG 24 hr tablet, TAKE 1 TABLET BY MOUTH AT BEDTIME, Disp: , Rfl: 2  •  potassium chloride (K-DUR,KLOR-CON) 10 MEQ CR tablet, Take 1 tablet by mouth Daily. (Patient  "taking differently: Take 10 mEq by mouth Daily As Needed (TAKES THIS SUPPLEMENT ONLY WHEN TAKING LASIX).), Disp: , Rfl:   •  simvastatin (ZOCOR) 40 MG tablet, Take 20 mg by mouth Every Night., Disp: , Rfl:   •  solifenacin (VESICARE) 10 MG tablet, Take 10 mg by mouth Daily., Disp: , Rfl:     Allergies   Allergen Reactions   • Adhesive Tape Other (See Comments)     \"TEARS MY SKIN\"   • Allegra [Fexofenadine] Other (See Comments)     MIGRAINES   • Fluoxetine Hcl Other (See Comments)     MIGRAINES   • Nortriptyline Other (See Comments)     MIGRAINES   • Prozac [Fluoxetine] Other (See Comments)     MIGRAINES     • Trazodone Other (See Comments)     MIGRAINES   • Trazodone And Nefazodone Other (See Comments)     MIGRAINES     • Lamotrigine Rash       Blood pressure 136/88, pulse 57, temperature 97 °F (36.1 °C), resp. rate 20, height 172.7 cm (67.99\"), weight (!) 173 kg (382 lb).    Physical Exam   Constitutional: She is oriented to person, place, and time. She appears well-developed and well-nourished. No distress.   HENT:   Head: Normocephalic and atraumatic.   Right Ear: Hearing and external ear normal.   Left Ear: Hearing and external ear normal.   Nose: Nose normal.   Mouth/Throat: Oropharynx is clear and moist and mucous membranes are normal. Mucous membranes are not pale, not dry and not cyanotic. No oral lesions. No oropharyngeal exudate.   Eyes: Conjunctivae and EOM are normal. Right eye exhibits no discharge. Left eye exhibits no discharge. No scleral icterus.   Neck: Trachea normal. Neck supple. No JVD present. No edema present. No thyroid mass and no thyromegaly present.   Cardiovascular: Normal rate, regular rhythm, S2 normal and normal heart sounds.  Exam reveals no gallop, no S3 and no friction rub.    No murmur heard.  Pulmonary/Chest: Effort normal and breath sounds normal. No respiratory distress. She has no wheezes. She has no rales. She exhibits no tenderness.   Abdominal: Soft. Normal appearance and " bowel sounds are normal. She exhibits no distension, no ascites and no mass. There is no splenomegaly or hepatomegaly. There is no tenderness. There is no rigidity, no rebound and no guarding. No hernia.   Musculoskeletal: She exhibits no tenderness or deformity.       Vascular Status -  Her exam exhibits no right foot edema. Her exam exhibits no left foot edema.  Lymphadenopathy:     She has no cervical adenopathy.        Left: No supraclavicular adenopathy present.   Neurological: She is alert and oriented to person, place, and time. She has normal strength. No cranial nerve deficit or sensory deficit. She exhibits normal muscle tone. Coordination normal.   Skin: No rash noted. She is not diaphoretic. No cyanosis. No pallor. Nails show no clubbing.   Psychiatric: She has a normal mood and affect. Her behavior is normal. Judgment and thought content normal.   Nursing note and vitals reviewed.    Stigmata of chronic liver disease:  None.  Asterixis:  None.    Laboratory Testing:  Upon review of medical records:      Dated June 22, 2017 white count 7.9 hemoglobin 11.1 hematocrit 35 and platelet count 305.    Dated January 4, 2018 TSH 1.620    Dated February 5, 2018 sodium 147 potassium 4.0 chloride 110 CO2 24 BUN 19 serum creatinine 1.30 glucose 123.  Calcium 9.5.  Total protein 7.4.  Albumin 4.40.  T bili 0.5 AST 33 ALT 69 alkaline phosphatase 99.  GGT 20.     Procedures:    Upon review of records:     Dated February 20, 2018 the patient underwent a colonoscopy to the terminal ileum which revealed: Diverticulosis involving the left colon and transverse colon.  Focal areas of colitis-erythema and granularity in the cecum and ascending colon.  Scant vascular ectasia in the right colon.  Colon polyps.  5 mm submucosal nodule in the descending colon at 40 cm from anal verge which may represent carcinoid nodule.  Multiple biopsies were obtained to include submucosal tissue.  Internal hemorrhoids.  Cecum, biopsies  revealed chronic active colitis with crypt abscesses compatible with idiopathic inflammatory bowel disease.  Ascending colon, biopsies revealed chronic active colitis compatible with idiopathic inflammatory bowel disease.  Transverse colon, polyps, biopsies revealed tubular adenomas.  Chronic active colitis with crypt abscesses compatible with idiopathic inflammatory bowel disease.  Proximal descending colon, some mucosal nodule at 40 cm, biopsies revealed serrated polyp compatible with sessile serrated adenoma.  Negative for submucosa.  Distal rectum, polypoid area, biopsies revealed hyperplastic polyp.  Rectal polyp, biopsies revealed hyperplastic polyp.  Transverse colon, polyp #2, biopsies revealed tubular adenoma.    Assessment and Plan:      Caty was seen today for gi problem.    Diagnoses and all orders for this visit:    Diarrhea, unspecified type  Comments:  History of occasional episodic diarrhea.    Anemia, unspecified type  Comments:  Possibly related to focal erosive colitis.    Colitis  Comments:  Biopsies are suggestive of underlying inflammatory bowel disease.    Adenomatous polyp of transverse colon  Comments:  Tubular adenomata.    Nodule of colon  Comments:  At 40 cm from anal verge. Overall appearance is suggestive of possible carcinoid. Biopsies however negative. Interestingly no submucosal tissue.              Plan  and Patient Instructions:    Patient Instructions   1. Low-fat-low lactose-consistent carbohydrate diet.  2. Weight reduction.  3. The patient should continue to watch for worsening of diarrhea, bleeding or abdominal pain.  4. It may be prudent to repeat a colonoscopy in 6 months to a year's time to reevaluate the areas. The patient will call back.  5. Avoid NSAIDs.  6. Discussed with the patient in detail. Opportunity was given to ask questions.        Shravan Tolliver MD

## 2018-03-06 ENCOUNTER — PREP FOR SURGERY (OUTPATIENT)
Dept: OTHER | Facility: HOSPITAL | Age: 58
End: 2018-03-06

## 2018-03-06 ENCOUNTER — OFFICE VISIT (OUTPATIENT)
Dept: OBSTETRICS AND GYNECOLOGY | Facility: CLINIC | Age: 58
End: 2018-03-06

## 2018-03-06 VITALS
BODY MASS INDEX: 44.41 KG/M2 | HEIGHT: 68 IN | DIASTOLIC BLOOD PRESSURE: 82 MMHG | SYSTOLIC BLOOD PRESSURE: 138 MMHG | WEIGHT: 293 LBS

## 2018-03-06 DIAGNOSIS — N84.1 CERVICAL POLYP: Primary | ICD-10-CM

## 2018-03-06 PROCEDURE — 99214 OFFICE O/P EST MOD 30 MIN: CPT | Performed by: PHYSICIAN ASSISTANT

## 2018-03-06 RX ORDER — SODIUM CHLORIDE 0.9 % (FLUSH) 0.9 %
1-10 SYRINGE (ML) INJECTION AS NEEDED
Status: CANCELLED | OUTPATIENT
Start: 2018-03-06

## 2018-03-06 NOTE — PROGRESS NOTES
Subjective   Chief Complaint   Patient presents with   • Gynecologic Exam     Had pap done -WNL but PCP seen a cervical polyp; here today to have it removed.       Caty Garcia is a 57 y.o. year old  presenting to be seen for cervical polyp.  Reports had pap in January per Dr Katz and noted to have cervical polyp  Pap returned normal per patient  Denies any vaginal bleeding, spotting or pelvic pain  Last period 8-10 years ago    Past Medical History:   Diagnosis Date   • Anxiety and depression    • Arthritis     BOTHERSOM IN RIGHT SHOULDER   • Bipolar 1 disorder    • Blind     left eye   • Colon polyp    • Diabetes mellitus    • Disease of thyroid gland    • Heart murmur    • High blood pressure    • High cholesterol    • History of nuclear stress test     REPORTS APX. 6 YEARS AGO AND THAT ALL WAS WNL'S   • Hypothyroid    • Migraine     REPORTS NONE FOR 10+ YEARS   • MRSA (methicillin resistant Staphylococcus aureus) 2017    REPORTS OF LEFT ANKLE AND THAT SHE WAS TREATED   • Sleep apnea     USES BIPAP HS, TO BRING IN DOS   • Wears glasses         Current Outpatient Prescriptions:   •  ARIPiprazole (ABILIFY) 30 MG tablet, TAKE ONE (1) TABLET BY MOUTH AT BEDTIME, Disp: , Rfl: 0  •  buPROPion SR (WELLBUTRIN SR) 150 MG 12 hr tablet, Take 150 mg by mouth Daily., Disp: , Rfl:   •  carvedilol (COREG) 25 MG tablet, TAKE 1 TABLET BY MOUTH TWICE A DAY, Disp: , Rfl: 1  •  diltiaZEM CD (CARDIZEM CD) 240 MG 24 hr capsule, Take 480 mg by mouth Daily., Disp: , Rfl:   •  ergocalciferol (ERGOCALCIFEROL) 69707 UNITS capsule, Take 50,000 Units by mouth 1 (One) Time Per Week. TAKES MEDICATION ON , Disp: , Rfl:   •  furosemide (LASIX) 20 MG tablet, Take 2 tablets by mouth Daily. (Patient taking differently: Take 40 mg by mouth Daily As Needed (MAY TAKE FOR SWELLING AS NEEDED AND CONTACT MD IF NEEDS TO TAKE).), Disp: 60 tablet, Rfl: 3  •  glipiZIDE (GLUCOTROL) 10 MG tablet, Take 10 mg by mouth 2  "(Two) Times a Day Before Meals., Disp: , Rfl:   •  hydrOXYzine (ATARAX) 25 MG tablet, Take 50 mg by mouth Every Night., Disp: , Rfl:   •  levothyroxine (SYNTHROID, LEVOTHROID) 150 MCG tablet, Take 150 mcg by mouth Daily., Disp: , Rfl:   •  lithium (ESKALITH) 450 MG CR tablet, Take 450 mg by mouth Every Night., Disp: , Rfl:   •  Multiple Vitamin (MULTI-DAY PO), Take 1 tablet by mouth Daily., Disp: , Rfl:   •  oxybutynin XL (DITROPAN XL) 15 MG 24 hr tablet, TAKE 1 TABLET BY MOUTH AT BEDTIME, Disp: , Rfl: 2  •  potassium chloride (K-DUR,KLOR-CON) 10 MEQ CR tablet, Take 1 tablet by mouth Daily. (Patient taking differently: Take 10 mEq by mouth Daily As Needed (TAKES THIS SUPPLEMENT ONLY WHEN TAKING LASIX).), Disp: , Rfl:   •  simvastatin (ZOCOR) 40 MG tablet, Take 20 mg by mouth Every Night., Disp: , Rfl:   •  solifenacin (VESICARE) 10 MG tablet, Take 10 mg by mouth Daily., Disp: , Rfl:    Allergies   Allergen Reactions   • Adhesive Tape Other (See Comments)     \"TEARS MY SKIN\"   • Allegra [Fexofenadine] Other (See Comments)     MIGRAINES   • Fluoxetine Hcl Other (See Comments)     MIGRAINES   • Nortriptyline Other (See Comments)     MIGRAINES   • Prozac [Fluoxetine] Other (See Comments)     MIGRAINES     • Trazodone Other (See Comments)     MIGRAINES   • Trazodone And Nefazodone Other (See Comments)     MIGRAINES     • Lamotrigine Rash      Past Surgical History:   Procedure Laterality Date   • CHOLECYSTECTOMY  2007   • COLONOSCOPY  2014   • COLONOSCOPY     • COLONOSCOPY N/A 2/20/2018    Procedure: COLONOSCOPY with hot  snare polypectomies , biopsies, and submucosal injection katy ink;  Surgeon: Shravan Tolliver MD;  Location: [facility] ENDOSCOPY;  Service:    • GASTRIC SLEEVE LAPAROSCOPIC  2014   • INTERVENTIONAL RADIOLOGY PROCEDURE N/A 5/26/2017    Procedure: Abdominal Aortagram with Runoff;  Surgeon: Otf Mehta MD;  Location: [facility] CATH INVASIVE LOCATION;  Service:    • JOINT REPLACEMENT Right 2008    HIP " "REPLACEMENT   • JOINT REPLACEMENT Left 2015    HIP REPLACEMENT   • SKIN BIOPSY Right     SHOULDER   • TOTAL HIP ARTHROPLASTY Right 2008   • UPPER GASTROINTESTINAL ENDOSCOPY     • WISDOM TOOTH EXTRACTION        Social History     Social History   • Marital status: Single     Spouse name: N/A   • Number of children: N/A   • Years of education: N/A     Occupational History   •  Unemployed     Social History Main Topics   • Smoking status: Never Smoker   • Smokeless tobacco: Never Used   • Alcohol use No   • Drug use: No   • Sexual activity: No     Other Topics Concern   • Not on file     Social History Narrative      Family History   Problem Relation Age of Onset   • Hypertension Father    • Hyperlipidemia Father    • Diabetes Father    • Heart attack Father    • Liver cancer Father    • Diabetes Brother    • Colon cancer Maternal Grandfather    • Breast cancer Mother        Review of Systems   Eyes: Positive for visual disturbance.   Gastrointestinal: Positive for diarrhea.   Genitourinary: Positive for frequency.   Psychiatric/Behavioral: Positive for dysphoric mood and sleep disturbance.   All other systems reviewed and are negative.          Objective   /82  Ht 172.7 cm (68\")  Wt (!) 172 kg (380 lb)  LMP  (LMP Unknown)  Breastfeeding? No  BMI 57.78 kg/m2    Physical Exam   Constitutional: No distress.   Morbidly obese   Eyes: Conjunctivae and lids are normal.   Neck: No thyroid mass and no thyromegaly present.   Cardiovascular: Normal rate, regular rhythm and normal heart sounds.    Pulmonary/Chest: Effort normal.   Abdominal: Bowel sounds are normal. There is no tenderness. There is no rigidity and no guarding.   Genitourinary: There is no tenderness or lesion on the right labia. There is no tenderness or lesion on the left labia.   Genitourinary Comments: Large cervical polyp extending from cervix approx 2.5 cm. Cannot see base of polyp in cervix   Neurological: She is alert.            Assessment and " Mayra Byrne was seen today for gynecologic exam.    Diagnoses and all orders for this visit:    Cervical polyp      Patient Instructions   Patient with large cervical polyp but unable to see base of polyp therefore not comfortable attempting  Removal in office  Will schedule removal under sedation with hysteroscopy D&C also-patient in agreement                This note was electronically signed.    Adri Valentine PA-C   March 6, 2018

## 2018-03-06 NOTE — PATIENT INSTRUCTIONS
Patient with large cervical polyp but unable to see base of polyp therefore not comfortable attempting  Removal in office  Will schedule removal under sedation with hysteroscopy D&C also-patient in agreement

## 2018-03-07 PROBLEM — N84.1 CERVICAL POLYP: Status: ACTIVE | Noted: 2018-03-07

## 2018-03-08 ENCOUNTER — APPOINTMENT (OUTPATIENT)
Dept: PREADMISSION TESTING | Facility: HOSPITAL | Age: 58
End: 2018-03-08

## 2018-03-08 VITALS
WEIGHT: 293 LBS | SYSTOLIC BLOOD PRESSURE: 158 MMHG | HEIGHT: 67 IN | DIASTOLIC BLOOD PRESSURE: 88 MMHG | BODY MASS INDEX: 45.99 KG/M2 | OXYGEN SATURATION: 96 % | HEART RATE: 59 BPM

## 2018-03-08 DIAGNOSIS — Z79.899 NEED FOR PROPHYLACTIC CHEMOTHERAPY: ICD-10-CM

## 2018-03-08 DIAGNOSIS — N84.1 CERVICAL POLYP: ICD-10-CM

## 2018-03-08 LAB
ALBUMIN SERPL-MCNC: 4.1 G/DL (ref 3.5–5)
ALBUMIN/GLOB SERPL: 1.5 G/DL (ref 1–2)
ALP SERPL-CCNC: 85 U/L (ref 38–126)
ALT SERPL W P-5'-P-CCNC: 63 U/L (ref 13–69)
ANION GAP SERPL CALCULATED.3IONS-SCNC: 17 MMOL/L
AST SERPL-CCNC: 44 U/L (ref 15–46)
BACTERIA UR QL AUTO: ABNORMAL /HPF
BASOPHILS # BLD AUTO: 0.07 10*3/MM3 (ref 0–0.2)
BASOPHILS NFR BLD AUTO: 0.8 % (ref 0–2.5)
BILIRUB SERPL-MCNC: 0.3 MG/DL (ref 0.2–1.3)
BILIRUB UR QL STRIP: NEGATIVE
BUN BLD-MCNC: 18 MG/DL (ref 7–20)
BUN/CREAT SERPL: 16.4 (ref 7.1–23.5)
CALCIUM SPEC-SCNC: 9.4 MG/DL (ref 8.4–10.2)
CHLORIDE SERPL-SCNC: 112 MMOL/L (ref 98–107)
CLARITY UR: CLEAR
CO2 SERPL-SCNC: 25 MMOL/L (ref 26–30)
COLOR UR: YELLOW
CREAT BLD-MCNC: 1.1 MG/DL (ref 0.6–1.3)
DEPRECATED RDW RBC AUTO: 45.1 FL (ref 37–54)
EOSINOPHIL # BLD AUTO: 0.32 10*3/MM3 (ref 0–0.7)
EOSINOPHIL NFR BLD AUTO: 3.8 % (ref 0–7)
ERYTHROCYTE [DISTWIDTH] IN BLOOD BY AUTOMATED COUNT: 13.7 % (ref 11.5–14.5)
GFR SERPL CREATININE-BSD FRML MDRD: 51 ML/MIN/1.73
GLOBULIN UR ELPH-MCNC: 2.7 GM/DL
GLUCOSE BLD-MCNC: 78 MG/DL (ref 74–98)
GLUCOSE UR STRIP-MCNC: NEGATIVE MG/DL
HCT VFR BLD AUTO: 38.3 % (ref 37–47)
HGB BLD-MCNC: 12.3 G/DL (ref 12–16)
HGB UR QL STRIP.AUTO: NEGATIVE
HYALINE CASTS UR QL AUTO: ABNORMAL /LPF
IMM GRANULOCYTES # BLD: 0.02 10*3/MM3 (ref 0–0.06)
IMM GRANULOCYTES NFR BLD: 0.2 % (ref 0–0.6)
KETONES UR QL STRIP: NEGATIVE
LEUKOCYTE ESTERASE UR QL STRIP.AUTO: ABNORMAL
LYMPHOCYTES # BLD AUTO: 2.17 10*3/MM3 (ref 0.6–3.4)
LYMPHOCYTES NFR BLD AUTO: 25.6 % (ref 10–50)
MCH RBC QN AUTO: 29.1 PG (ref 27–31)
MCHC RBC AUTO-ENTMCNC: 32.1 G/DL (ref 30–37)
MCV RBC AUTO: 90.8 FL (ref 81–99)
MONOCYTES # BLD AUTO: 0.53 10*3/MM3 (ref 0–0.9)
MONOCYTES NFR BLD AUTO: 6.3 % (ref 0–12)
NEUTROPHILS # BLD AUTO: 5.36 10*3/MM3 (ref 2–6.9)
NEUTROPHILS NFR BLD AUTO: 63.3 % (ref 37–80)
NITRITE UR QL STRIP: NEGATIVE
NRBC BLD MANUAL-RTO: 0 /100 WBC (ref 0–0)
PH UR STRIP.AUTO: <=5 [PH] (ref 5–8)
PLATELET # BLD AUTO: 305 10*3/MM3 (ref 130–400)
PMV BLD AUTO: 10 FL (ref 6–12)
POTASSIUM BLD-SCNC: 4 MMOL/L (ref 3.5–5.1)
PROT SERPL-MCNC: 6.8 G/DL (ref 6.3–8.2)
PROT UR QL STRIP: NEGATIVE
RBC # BLD AUTO: 4.22 10*6/MM3 (ref 4.2–5.4)
RBC # UR: ABNORMAL /HPF
REF LAB TEST METHOD: ABNORMAL
SODIUM BLD-SCNC: 150 MMOL/L (ref 137–145)
SP GR UR STRIP: 1.02 (ref 1–1.03)
SQUAMOUS #/AREA URNS HPF: ABNORMAL /HPF
UROBILINOGEN UR QL STRIP: ABNORMAL
WBC NRBC COR # BLD: 8.47 10*3/MM3 (ref 4.8–10.8)
WBC UR QL AUTO: ABNORMAL /HPF

## 2018-03-08 PROCEDURE — 81001 URINALYSIS AUTO W/SCOPE: CPT | Performed by: PHYSICIAN ASSISTANT

## 2018-03-08 PROCEDURE — 85025 COMPLETE CBC W/AUTO DIFF WBC: CPT | Performed by: PHYSICIAN ASSISTANT

## 2018-03-08 PROCEDURE — 80053 COMPREHEN METABOLIC PANEL: CPT | Performed by: NURSE PRACTITIONER

## 2018-03-08 PROCEDURE — 36415 COLL VENOUS BLD VENIPUNCTURE: CPT

## 2018-03-08 PROCEDURE — 87086 URINE CULTURE/COLONY COUNT: CPT | Performed by: PHYSICIAN ASSISTANT

## 2018-03-08 PROCEDURE — 93005 ELECTROCARDIOGRAM TRACING: CPT | Performed by: OBSTETRICS & GYNECOLOGY

## 2018-03-08 NOTE — PAT
DISCUSSED WITH MELINDA GARCIA CRNA PATIENT ABNORMAL EKG , MEDICAL HISTORY. CRNA STATES PATIENT IS ACCEPTABLE FOR SURGERY.

## 2018-03-08 NOTE — DISCHARGE INSTRUCTIONS
DO NOT EAT, DRINK, SMOKE, USE SMOKELESS TOBACCO OR CHEW GUM AFTER MIDNIGHT THE NIGHT BEFORE SURGERY.  THIS ALSO INCLUDES HARD CANDIES AND MINTS.    DO NOT SHAVE THE AREA TO BE OPERATED ON AT LEAST 48 HOURS PRIOR TO THE PROCEDURE.  DO NOT WEAR MAKE UP OR NAIL POLISH.  DO NOT LEAVE IN ANY PIERCING OR WEAR JEWELRY THE DAY OF SURGERY.      DO NOT USE ADHESIVES IF YOU WEAR DENTURES.    DO NOT WEAR EYE CONTACTS; BRING IN YOUR GLASSES.    ONLY TAKE MEDICATION THE MORNING OF YOUR PROCEDURE IF INSTRUCTED BY YOUR SURGEON WITH ENOUGH WATER TO SWALLOW THE MEDICATION.  IF YOUR SURGEON DID NOT SPECIFY WHICH MEDICATIONS TO TAKE, YOU WILL NEED TO CALL THEIR OFFICE FOR FURTHER INSTRUCTIONS AND DO AS THEY INSTRUCT.    LEAVE ANYTHING YOU CONSIDER VALUABLE AT HOME.    YOU WILL NEED TO ARRANGE FOR SOMEONE TO DRIVE YOU HOME AFTER SURGERY.  IT IS RECOMMENDED THAT YOU DO NOT DRIVE, WORK, DRINK ALCOHOL OR MAKE MAJOR DECISIONS FOR AT LEAST 24 HOURS AFTER YOUR PROCEDURE IS COMPLETE.      THE DAY OF YOUR PROCEDURE, BRING IN THE FOLLOWING IF APPLICABLE:   PICTURE ID AND INSURANCE/MEDICARE OR MEDICAID CARDS   COPY OF ADVANCED DIRECTIVE/LIVING WILL/POWER OR    CPAP/BIPAP/INHALERS   SKIN PREP SHEET   YOUR PREADMISSION TESTING PASS (IF NOT A PHONE HISTORY)

## 2018-03-10 LAB — BACTERIA SPEC AEROBE CULT: NORMAL

## 2018-03-19 ENCOUNTER — OFFICE VISIT (OUTPATIENT)
Dept: BARIATRICS/WEIGHT MGMT | Facility: CLINIC | Age: 58
End: 2018-03-19

## 2018-03-19 VITALS
BODY MASS INDEX: 45.99 KG/M2 | TEMPERATURE: 97.2 F | OXYGEN SATURATION: 99 % | DIASTOLIC BLOOD PRESSURE: 85 MMHG | WEIGHT: 293 LBS | RESPIRATION RATE: 18 BRPM | HEIGHT: 67 IN | SYSTOLIC BLOOD PRESSURE: 143 MMHG | HEART RATE: 59 BPM

## 2018-03-19 DIAGNOSIS — Z98.84 STATUS POST BARIATRIC SURGERY: ICD-10-CM

## 2018-03-19 DIAGNOSIS — G47.33 OBSTRUCTIVE SLEEP APNEA SYNDROME: ICD-10-CM

## 2018-03-19 DIAGNOSIS — E11.69 DIABETES MELLITUS TYPE 2 IN OBESE (HCC): ICD-10-CM

## 2018-03-19 DIAGNOSIS — E55.9 HYPOVITAMINOSIS D: ICD-10-CM

## 2018-03-19 DIAGNOSIS — M54.9 BACK PAIN, UNSPECIFIED BACK LOCATION, UNSPECIFIED BACK PAIN LATERALITY, UNSPECIFIED CHRONICITY: ICD-10-CM

## 2018-03-19 DIAGNOSIS — E78.5 HYPERLIPIDEMIA, UNSPECIFIED HYPERLIPIDEMIA TYPE: ICD-10-CM

## 2018-03-19 DIAGNOSIS — K91.2 POSTGASTRECTOMY MALABSORPTION: ICD-10-CM

## 2018-03-19 DIAGNOSIS — Z13.0 SCREENING, IRON DEFICIENCY ANEMIA: ICD-10-CM

## 2018-03-19 DIAGNOSIS — R53.83 FATIGUE, UNSPECIFIED TYPE: Primary | ICD-10-CM

## 2018-03-19 DIAGNOSIS — Z90.3 POSTGASTRECTOMY MALABSORPTION: ICD-10-CM

## 2018-03-19 DIAGNOSIS — Z13.21 MALNUTRITION SCREEN: ICD-10-CM

## 2018-03-19 DIAGNOSIS — I10 ESSENTIAL HYPERTENSION: ICD-10-CM

## 2018-03-19 DIAGNOSIS — E66.9 DIABETES MELLITUS TYPE 2 IN OBESE (HCC): ICD-10-CM

## 2018-03-19 PROCEDURE — 99214 OFFICE O/P EST MOD 30 MIN: CPT | Performed by: SURGERY

## 2018-03-19 NOTE — PROGRESS NOTES
[facility]  [address]  [facility] [id number] [phone]        Patient Name:  Caty Garcia.  :  1960      Date of Visit: 3/19/2018      Reason for Visit:   4 years postop      HPI: Caty Garcia is a 57 y.o. female s/p LSG/HHR 9/15/2014 by Dr. Godfrey (info per patient, op note not available for review).    Doing well.  No issues/concerns. Denies dysphagia, reflux, nausea, vomiting and abdominal pain.  Complains of liquid bowel movements since surgery, gets up at night to go to the bathroom.  Tries to eat high fiber foods.  Has not spoken to her doctor about it.  Had recent colonoscopy, repeat in 4 months for followup of cancerous polyps.  Getting 50-75g prot/day.    Taking MVI.  Exercise: water aerobics 4-5 days per week.  Also does 1 hour of volleyball daily.  Upcoming DNC and cervical polyp removal.       Presurgery weight: 427 pounds.  Today's weight is (!) 173 kg (381 lb 0.2 oz) pounds, today's  Body mass index is 59.67 kg/m²., and her weight loss since surgery is 46 pounds.  Lowest weight after surgery was 313. Gained back to 381.    Breakfast: grits, oatmeal, peanut butter  Lunch: chicken breast or ground beef, potatoes or rice  Dinner: leftover from lunch, potatoes or rice    Has snacked on crunchy granola mix with honey, but is currently out.  Has 2 cups easily per day.    Drinks Crystal Light.       Past Medical History:   Diagnosis Date   • Anxiety and depression    • Arthritis     BOTHERSOM IN RIGHT SHOULDER   • Bipolar 1 disorder    • Blind     left eye   • Chronic kidney disease     sees dr narvaez last seen 2 to 3 months ago   • Colon polyp    • Diabetes mellitus    • Disease of thyroid gland    • Heart murmur    • High blood pressure    • High cholesterol    • History of nuclear stress test     REPORTS APX. 6 YEARS AGO AND THAT ALL WAS WNL'S   • Hypothyroid    • Migraine     REPORTS NONE FOR 10+ YEARS   • MRSA  (methicillin resistant Staphylococcus aureus) 06/2017    REPORTS OF LEFT ANKLE AND THAT SHE WAS TREATED   • Sleep apnea 2011    USES BIPAP HS, TO BRING IN DOS   • Wears glasses      Past Surgical History:   Procedure Laterality Date   • CHOLECYSTECTOMY  2007   • COLONOSCOPY  2014   • COLONOSCOPY     • COLONOSCOPY N/A 2/20/2018    Procedure: COLONOSCOPY with hot  snare polypectomies , biopsies, and submucosal injection katy ink;  Surgeon: Shravan Tolliver MD;  Location: [facility] ENDOSCOPY;  Service:    • GASTRIC SLEEVE LAPAROSCOPIC  2014   • INTERVENTIONAL RADIOLOGY PROCEDURE N/A 5/26/2017    Procedure: Abdominal Aortagram with Runoff;  Surgeon: Otf Mehta MD;  Location: [facility] CATH INVASIVE LOCATION;  Service:    • JOINT REPLACEMENT Right 2008    HIP REPLACEMENT   • JOINT REPLACEMENT Left 2015    HIP REPLACEMENT   • SKIN BIOPSY Right     SHOULDER   • TOTAL HIP ARTHROPLASTY Right 2008   • UPPER GASTROINTESTINAL ENDOSCOPY     • WISDOM TOOTH EXTRACTION       Outpatient Prescriptions Marked as Taking for the 3/19/18 encounter (Office Visit) with Rachael Verde MD   Medication Sig Dispense Refill   • ARIPiprazole (ABILIFY) 30 MG tablet TAKE ONE (1) TABLET BY MOUTH AT BEDTIME  0   • buPROPion SR (WELLBUTRIN SR) 150 MG 12 hr tablet Take 150 mg by mouth Daily.     • carvedilol (COREG) 25 MG tablet TAKE 1 TABLET BY MOUTH TWICE A DAY  1   • diltiaZEM CD (CARDIZEM CD) 240 MG 24 hr capsule Take 480 mg by mouth Daily.     • ergocalciferol (ERGOCALCIFEROL) 29125 UNITS capsule Take 50,000 Units by mouth 1 (One) Time Per Week. TAKES MEDICATION ON Sunday     • furosemide (LASIX) 20 MG tablet Take 2 tablets by mouth Daily. (Patient taking differently: Take 40 mg by mouth Daily As Needed (MAY TAKE FOR SWELLING AS NEEDED AND CONTACT MD IF NEEDS TO TAKE).) 60 tablet 3   • glipiZIDE (GLUCOTROL) 10 MG tablet Take 10 mg by mouth 2 (Two) Times a Day Before Meals.     • hydrOXYzine (ATARAX) 25 MG tablet Take 50 mg by mouth  "At Night As Needed for Itching.     • levothyroxine (SYNTHROID, LEVOTHROID) 150 MCG tablet Take 150 mcg by mouth Daily.     • lithium (ESKALITH) 450 MG CR tablet Take 450 mg by mouth Every Night.     • Multiple Vitamin (MULTI-DAY PO) Take 1 tablet by mouth Daily.     • oxybutynin XL (DITROPAN XL) 15 MG 24 hr tablet TAKE 1 TABLET BY MOUTH AT BEDTIME  2   • potassium chloride (K-DUR,KLOR-CON) 10 MEQ CR tablet Take 1 tablet by mouth Daily. (Patient taking differently: Take 10 mEq by mouth Daily As Needed (TAKES THIS SUPPLEMENT ONLY WHEN TAKING LASIX).)     • simvastatin (ZOCOR) 40 MG tablet Take 20 mg by mouth Every Night.     • solifenacin (VESICARE) 10 MG tablet Take 10 mg by mouth Daily.         Allergies   Allergen Reactions   • Adhesive Tape Other (See Comments)     \"TEARS MY SKIN\"   • Allegra [Fexofenadine] Other (See Comments)     MIGRAINES   • Fluoxetine Hcl Other (See Comments)     MIGRAINES   • Nortriptyline Other (See Comments)     MIGRAINES   • Prozac [Fluoxetine] Other (See Comments)     MIGRAINES     • Trazodone Other (See Comments)     MIGRAINES   • Trazodone And Nefazodone Other (See Comments)     MIGRAINES     • Lamotrigine Rash       Social History     Social History   • Marital status: Single     Spouse name: N/A   • Number of children: N/A   • Years of education: N/A     Occupational History   •  Unemployed     Social History Main Topics   • Smoking status: Never Smoker   • Smokeless tobacco: Never Used   • Alcohol use No   • Drug use: No   • Sexual activity: No     Other Topics Concern   • Not on file     Social History Narrative   • No narrative on file       /85 (BP Location: Left arm, Patient Position: Sitting, Cuff Size: Large Adult)   Pulse 59   Temp 97.2 °F (36.2 °C) (Temporal Artery )   Resp 18   Ht 170.2 cm (67\")   Wt (!) 173 kg (381 lb 0.2 oz)   LMP  (LMP Unknown)   SpO2 99%   BMI 59.67 kg/m²     Physical Exam   Constitutional: She is oriented to person, place, and time. She " appears well-developed and well-nourished. No distress.   HENT:   Head: Normocephalic and atraumatic.   Mouth/Throat: No oropharyngeal exudate.   Eyes: Conjunctivae and EOM are normal. Pupils are equal, round, and reactive to light.   Pulmonary/Chest: Effort normal. No respiratory distress.   Abdominal: Soft. She exhibits no distension. There is no tenderness.   Incisions well-healed   Neurological: She is alert and oriented to person, place, and time. No cranial nerve deficit.   Skin: Skin is warm and dry. No rash noted. She is not diaphoretic. No erythema.   Psychiatric: She has a normal mood and affect. Her behavior is normal. Judgment and thought content normal.         Assessment:  4 years s/p LSG/HHR 9/15/2014 by Dr. Godfrey    ICD-10-CM ICD-9-CM   1. Fatigue, unspecified type R53.83 780.79   2. Hypovitaminosis D E55.9 268.9   3. Postgastrectomy malabsorption K91.2 579.3    Z90.3    4. Screening, iron deficiency anemia Z13.0 V78.0   5. Malnutrition screen Z13.21 V77.2   6. Status post bariatric surgery Z98.84 V45.86   7. Obstructive sleep apnea syndrome G47.33 327.23   8. Essential hypertension I10 401.9   9. Hyperlipidemia, unspecified hyperlipidemia type E78.5 272.4   10. Diabetes mellitus type 2 in obese E11.69 250.00    E66.9 278.00   11. Back pain, unspecified back location, unspecified back pain laterality, unspecified chronicity M54.9 724.5   12. Body mass index (BMI) of 50-59.9 in adult Z68.43 V85.43         Plan:  Doing fairly well, +weight regain. Will schedule her to speak with dietician and do BMR.  Strive for protein 100 g/day.      Continue w/ good food choices and healthy habits.  Continue routine exercise.  Routine bariatric labs ordered.  Continue vitamins w/ adjustments pending lab results.  Call w/ problems/concerns.     The patient was instructed to follow up in 6 months, sooner if needed.    note: approx 15 of the 25 minute visit was spent counseling on nutrition and necessary  dietary/lifestyle modifications.    Rachael Verde MD

## 2018-03-20 NOTE — PAT
EKG FROM 3/8/18 WAS READ BY DR REYES AND STATES CORRELATE CLINICALLY . DISCUSSED READING WITH MAURICIO GARCIA CRNA . PATIENT STILL ACCEPTABLE FOR SURGERY. CRNA INSTRUCTED RN TO FAX COPY OF EKG TO PATIENTS PRM DR . RN ATTEMPTED TO CALL MD OFFICE FOR FAX NUMBER OFFICE CLOSED FOR LUNCH WILL CALL BACK AFTER LUNCH. FAXED TO DR MACIEL'S OFFICE ABNORMAL EKG.

## 2018-03-21 PROCEDURE — S0260 H&P FOR SURGERY: HCPCS | Performed by: OBSTETRICS & GYNECOLOGY

## 2018-03-21 NOTE — H&P
TONY [facility]lye  Caty Garcia  : 1960  MRN: [id number]  CSN: [id number]    History and Physical    Subjective   Caty Garcia is a 57 y.o. year old  who present for surgery due to presence of large endocervical polyp.  The base of the polyp was unable to be seen in the office.  Pt is here for removal with D&C and dx hysteroscopy.    History  Past Medical History:   Diagnosis Date   • Anxiety and depression    • Arthritis     BOTHERSOM IN RIGHT SHOULDER   • Bipolar 1 disorder    • Blind     left eye   • Chronic kidney disease     sees dr narvaez last seen 2 to 3 months ago   • Colon polyp    • Diabetes mellitus    • Disease of thyroid gland    • Heart murmur    • High blood pressure    • High cholesterol    • History of nuclear stress test     REPORTS APX. 6 YEARS AGO AND THAT ALL WAS WNL'S   • Hypothyroid    • Migraine     REPORTS NONE FOR 10+ YEARS   • MRSA (methicillin resistant Staphylococcus aureus) 2017    REPORTS OF LEFT ANKLE AND THAT SHE WAS TREATED   • Sleep apnea     USES BIPAP HS, TO BRING IN DOS   • Wears glasses      No current facility-administered medications on file prior to encounter.      Current Outpatient Prescriptions on File Prior to Encounter   Medication Sig Dispense Refill   • ARIPiprazole (ABILIFY) 30 MG tablet TAKE ONE (1) TABLET BY MOUTH AT BEDTIME  0   • buPROPion SR (WELLBUTRIN SR) 150 MG 12 hr tablet Take 150 mg by mouth Daily.     • carvedilol (COREG) 25 MG tablet TAKE 1 TABLET BY MOUTH TWICE A DAY  1   • diltiaZEM CD (CARDIZEM CD) 240 MG 24 hr capsule Take 480 mg by mouth Daily.     • ergocalciferol (ERGOCALCIFEROL) 57098 UNITS capsule Take 50,000 Units by mouth 1 (One) Time Per Week. TAKES MEDICATION ON      • furosemide (LASIX) 20 MG tablet Take 2 tablets by mouth Daily. (Patient taking differently: Take 40 mg by mouth Daily As Needed (MAY TAKE FOR SWELLING AS NEEDED AND CONTACT MD IF NEEDS TO TAKE).) 60 tablet 3   • glipiZIDE (GLUCOTROL)  "10 MG tablet Take 10 mg by mouth 2 (Two) Times a Day Before Meals.     • hydrOXYzine (ATARAX) 25 MG tablet Take 50 mg by mouth At Night As Needed for Itching.     • levothyroxine (SYNTHROID, LEVOTHROID) 150 MCG tablet Take 150 mcg by mouth Daily.     • lithium (ESKALITH) 450 MG CR tablet Take 450 mg by mouth Every Night.     • Multiple Vitamin (MULTI-DAY PO) Take 1 tablet by mouth Daily.     • oxybutynin XL (DITROPAN XL) 15 MG 24 hr tablet TAKE 1 TABLET BY MOUTH AT BEDTIME  2   • potassium chloride (K-DUR,KLOR-CON) 10 MEQ CR tablet Take 1 tablet by mouth Daily. (Patient taking differently: Take 10 mEq by mouth Daily As Needed (TAKES THIS SUPPLEMENT ONLY WHEN TAKING LASIX).)     • simvastatin (ZOCOR) 40 MG tablet Take 20 mg by mouth Every Night.     • solifenacin (VESICARE) 10 MG tablet Take 10 mg by mouth Daily.       Allergies   Allergen Reactions   • Adhesive Tape Other (See Comments)     \"TEARS MY SKIN\"   • Allegra [Fexofenadine] Other (See Comments)     MIGRAINES   • Fluoxetine Hcl Other (See Comments)     MIGRAINES   • Nortriptyline Other (See Comments)     MIGRAINES   • Prozac [Fluoxetine] Other (See Comments)     MIGRAINES     • Trazodone Other (See Comments)     MIGRAINES   • Trazodone And Nefazodone Other (See Comments)     MIGRAINES     • Lamotrigine Rash     Past Surgical History:   Procedure Laterality Date   • CHOLECYSTECTOMY  2007   • COLONOSCOPY  2014   • COLONOSCOPY     • COLONOSCOPY N/A 2/20/2018    Procedure: COLONOSCOPY with hot  snare polypectomies , biopsies, and submucosal injection katy ink;  Surgeon: Shravan Tolliver MD;  Location: [facility] ENDOSCOPY;  Service:    • GASTRIC SLEEVE LAPAROSCOPIC  2014   • INTERVENTIONAL RADIOLOGY PROCEDURE N/A 5/26/2017    Procedure: Abdominal Aortagram with Runoff;  Surgeon: Otf Mehta MD;  Location: [facility] CATH INVASIVE LOCATION;  Service:    • JOINT REPLACEMENT Right 2008    HIP REPLACEMENT   • JOINT REPLACEMENT Left 2015    HIP REPLACEMENT   • " SKIN BIOPSY Right     SHOULDER   • TOTAL HIP ARTHROPLASTY Right 2008   • UPPER GASTROINTESTINAL ENDOSCOPY     • WISDOM TOOTH EXTRACTION       Family History   Problem Relation Age of Onset   • Hypertension Father    • Hyperlipidemia Father    • Diabetes Father    • Heart attack Father    • Liver cancer Father    • Diabetes Brother    • Colon cancer Maternal Grandfather    • Breast cancer Mother      Social History     Social History   • Marital status: Single     Occupational History   •  Unemployed     Social History Main Topics   • Smoking status: Never Smoker   • Smokeless tobacco: Never Used   • Alcohol use No   • Drug use: No   • Sexual activity: No     Other Topics Concern   • Not on file     Review of Systems  The following systems were reviewed and negative:  constitution, eyes, ENT, respiratory, cardiovascular, gastrointestinal, genitourinary, integument, breast, hematologic / lymphatic, musculoskeletal, neurological, behavioral/psych, endocrine and allergies / immunologic     Objective  Recent Vitals       2/20/2018 2/26/2018 3/6/2018       BP: 132/71 136/88 138/82     Pulse: 57 57 -     Temp: - 97 °F (36.1 °C) -     Weight: - (!)  173 kg (382 lb) (!)  172 kg (380 lb)     BMI (Calculated): - 58.1 57.8         Physical Exam:  General Appearance: alert, appears stated age and cooperative  Head: normocephalic, without obvious abnormality and atraumatic  Eyes: lids and lashes normal, conjunctivae and sclerae normal, no icterus, no pallor and corneas clear  Lungs: clear to auscultation, respirations regular, respirations even and respirations unlabored  Heart: regular rhythm and normal rate, normal S1, S2, no murmur, gallop, or rubs and no click  Abdomen: normal bowel sounds, no masses, no hepatomegaly, no splenomegaly, soft non-tender, no guarding and no rebound tenderness  Extremities: moves extremities well, no edema, no cyanosis and no redness  Skin: no bleeding, bruising or rash and no lesions  noted  Psych: normal mood and affect, oriented to person, time and place, thought content organized and appropriate judgment    Labs  Lab Results   Component Value Date     03/08/2018    HGB 12.3 03/08/2018    HCT 38.3 03/08/2018    WBC 8.47 03/08/2018     (H) 03/08/2018    K 4.0 03/08/2018     (H) 03/08/2018    CO2 25.0 (L) 03/08/2018    BUN 18 03/08/2018    CREATININE 1.10 03/08/2018    GLUCOSE 78 03/08/2018    ALBUMIN 4.10 03/08/2018    CALCIUM 9.4 03/08/2018    AST 44 03/08/2018    ALT 63 03/08/2018    BILITOT 0.3 03/08/2018      Lab Results   Component Value Date    SQUAMEPIUA 3-6 (A) 03/08/2018    SPECGRAVUR 1.020 03/08/2018    KETONESU Negative 03/08/2018    BLOODU Negative 03/08/2018    LEUKOCYTESUR Small (1+) (A) 03/08/2018    NITRITEU Negative 03/08/2018    RBCUA 0-2 (A) 03/08/2018    WBCUA 13-20 (A) 03/08/2018    BACTERIA 1+ (A) 03/08/2018          Lab Results   Component Value Date    URINECX Mixed Suzy Isolated 03/08/2018        Assessment   1. Large endocervical polyp.     Plan   1. D&C with dx hysteroscopy and polypectomy.  2. ABx and DVT prophylaxis as indicated.  3. Risks, complications, benefits, and other alternatives discussed.  4. All questions answered and pt in agreement with plan.    Talita Roman M.D.  3/21/2018  8:56 AM

## 2018-03-22 ENCOUNTER — HOSPITAL ENCOUNTER (OUTPATIENT)
Facility: HOSPITAL | Age: 58
Setting detail: HOSPITAL OUTPATIENT SURGERY
Discharge: HOME | End: 2018-03-22
Attending: OBSTETRICS & GYNECOLOGY | Admitting: OBSTETRICS & GYNECOLOGY

## 2018-03-22 ENCOUNTER — ANESTHESIA (OUTPATIENT)
Dept: PERIOP | Facility: HOSPITAL | Age: 58
End: 2018-03-22

## 2018-03-22 ENCOUNTER — ANESTHESIA EVENT (OUTPATIENT)
Dept: PERIOP | Facility: HOSPITAL | Age: 58
End: 2018-03-22

## 2018-03-22 VITALS
DIASTOLIC BLOOD PRESSURE: 77 MMHG | HEIGHT: 67 IN | HEART RATE: 70 BPM | OXYGEN SATURATION: 95 % | RESPIRATION RATE: 20 BRPM | WEIGHT: 293 LBS | SYSTOLIC BLOOD PRESSURE: 132 MMHG | TEMPERATURE: 96.9 F | BODY MASS INDEX: 45.99 KG/M2

## 2018-03-22 DIAGNOSIS — N84.1 CERVICAL POLYP: ICD-10-CM

## 2018-03-22 PROCEDURE — 25010000002 METOCLOPRAMIDE PER 10 MG: Performed by: NURSE ANESTHETIST, CERTIFIED REGISTERED

## 2018-03-22 PROCEDURE — 25010000002 PROPOFOL 10 MG/ML EMULSION: Performed by: NURSE ANESTHETIST, CERTIFIED REGISTERED

## 2018-03-22 PROCEDURE — 25010000002 ONDANSETRON PER 1 MG: Performed by: NURSE ANESTHETIST, CERTIFIED REGISTERED

## 2018-03-22 PROCEDURE — 88305 TISSUE EXAM BY PATHOLOGIST: CPT | Performed by: OBSTETRICS & GYNECOLOGY

## 2018-03-22 PROCEDURE — 58558 HYSTEROSCOPY BIOPSY: CPT | Performed by: OBSTETRICS & GYNECOLOGY

## 2018-03-22 PROCEDURE — 25010000002 DEXAMETHASONE PER 1 MG: Performed by: NURSE ANESTHETIST, CERTIFIED REGISTERED

## 2018-03-22 RX ORDER — PROMETHAZINE HYDROCHLORIDE 25 MG/1
25 TABLET ORAL EVERY 6 HOURS PRN
Qty: 30 TABLET | Refills: 0 | Status: SHIPPED | OUTPATIENT
Start: 2018-03-22 | End: 2018-09-04

## 2018-03-22 RX ORDER — ONDANSETRON 2 MG/ML
4 INJECTION INTRAMUSCULAR; INTRAVENOUS ONCE AS NEEDED
Status: CANCELLED | OUTPATIENT
Start: 2018-03-22 | End: 2018-03-22

## 2018-03-22 RX ORDER — DEXAMETHASONE SODIUM PHOSPHATE 4 MG/ML
INJECTION, SOLUTION INTRA-ARTICULAR; INTRALESIONAL; INTRAMUSCULAR; INTRAVENOUS; SOFT TISSUE AS NEEDED
Status: DISCONTINUED | OUTPATIENT
Start: 2018-03-22 | End: 2018-03-22 | Stop reason: SURG

## 2018-03-22 RX ORDER — LIDOCAINE HYDROCHLORIDE 20 MG/ML
INJECTION, SOLUTION INFILTRATION; PERINEURAL AS NEEDED
Status: DISCONTINUED | OUTPATIENT
Start: 2018-03-22 | End: 2018-03-22 | Stop reason: SURG

## 2018-03-22 RX ORDER — LIDOCAINE HYDROCHLORIDE 20 MG/ML
INJECTION, SOLUTION INFILTRATION; PERINEURAL
Status: DISCONTINUED
Start: 2018-03-22 | End: 2018-03-22 | Stop reason: HOSPADM

## 2018-03-22 RX ORDER — ONDANSETRON 2 MG/ML
INJECTION INTRAMUSCULAR; INTRAVENOUS AS NEEDED
Status: DISCONTINUED | OUTPATIENT
Start: 2018-03-22 | End: 2018-03-22 | Stop reason: SURG

## 2018-03-22 RX ORDER — METOCLOPRAMIDE HYDROCHLORIDE 5 MG/ML
INJECTION INTRAMUSCULAR; INTRAVENOUS AS NEEDED
Status: DISCONTINUED | OUTPATIENT
Start: 2018-03-22 | End: 2018-03-22 | Stop reason: SURG

## 2018-03-22 RX ORDER — SODIUM CHLORIDE 0.9 % (FLUSH) 0.9 %
1-10 SYRINGE (ML) INJECTION AS NEEDED
Status: DISCONTINUED | OUTPATIENT
Start: 2018-03-22 | End: 2018-03-22 | Stop reason: HOSPADM

## 2018-03-22 RX ORDER — SODIUM CHLORIDE, SODIUM LACTATE, POTASSIUM CHLORIDE, CALCIUM CHLORIDE 600; 310; 30; 20 MG/100ML; MG/100ML; MG/100ML; MG/100ML
1000 INJECTION, SOLUTION INTRAVENOUS CONTINUOUS PRN
Status: DISCONTINUED | OUTPATIENT
Start: 2018-03-22 | End: 2018-03-22 | Stop reason: HOSPADM

## 2018-03-22 RX ORDER — SODIUM CHLORIDE 0.9 % (FLUSH) 0.9 %
3 SYRINGE (ML) INJECTION AS NEEDED
Status: DISCONTINUED | OUTPATIENT
Start: 2018-03-22 | End: 2018-03-22 | Stop reason: HOSPADM

## 2018-03-22 RX ORDER — MEPERIDINE HYDROCHLORIDE 50 MG/ML
25 INJECTION INTRAMUSCULAR; INTRAVENOUS; SUBCUTANEOUS
Status: CANCELLED | OUTPATIENT
Start: 2018-03-22 | End: 2018-03-22

## 2018-03-22 RX ORDER — LIDOCAINE HYDROCHLORIDE 20 MG/ML
INJECTION, SOLUTION INFILTRATION; PERINEURAL AS NEEDED
Status: DISCONTINUED | OUTPATIENT
Start: 2018-03-22 | End: 2018-03-22 | Stop reason: HOSPADM

## 2018-03-22 RX ORDER — MORPHINE SULFATE 2 MG/ML
2 INJECTION, SOLUTION INTRAMUSCULAR; INTRAVENOUS
Status: CANCELLED | OUTPATIENT
Start: 2018-03-22

## 2018-03-22 RX ORDER — PROPOFOL 10 MG/ML
VIAL (ML) INTRAVENOUS AS NEEDED
Status: DISCONTINUED | OUTPATIENT
Start: 2018-03-22 | End: 2018-03-22 | Stop reason: SURG

## 2018-03-22 RX ADMIN — METOCLOPRAMIDE 10 MG: 5 INJECTION, SOLUTION INTRAMUSCULAR; INTRAVENOUS at 11:06

## 2018-03-22 RX ADMIN — PROPOFOL 300 MG: 10 INJECTION, EMULSION INTRAVENOUS at 11:26

## 2018-03-22 RX ADMIN — DEXAMETHASONE SODIUM PHOSPHATE 8 MG: 4 INJECTION, SOLUTION INTRAMUSCULAR; INTRAVENOUS at 11:06

## 2018-03-22 RX ADMIN — ONDANSETRON 4 MG: 2 INJECTION INTRAMUSCULAR; INTRAVENOUS at 11:06

## 2018-03-22 RX ADMIN — SODIUM CHLORIDE, POTASSIUM CHLORIDE, SODIUM LACTATE AND CALCIUM CHLORIDE: 600; 310; 30; 20 INJECTION, SOLUTION INTRAVENOUS at 10:56

## 2018-03-22 RX ADMIN — LIDOCAINE HYDROCHLORIDE 100 MG: 20 INJECTION, SOLUTION INFILTRATION; PERINEURAL at 11:07

## 2018-03-22 NOTE — ANESTHESIA POSTPROCEDURE EVALUATION
Patient: Caty Garcia    Procedure Summary     Date:  03/22/18 Room / Location:  [facility] OR 2 /  [facility]    Anesthesia Start:  1056 Anesthesia Stop:  1132    Procedure:  DILATATION AND CURETTAGE HYSTEROSCOPY with removal large cervical polyp (N/A Uterus) Diagnosis:       Cervical polyp      (Cervical polyp [N84.1])    Surgeon:  Talita Roman MD Provider:  Jagjit Biggs CRNA    Anesthesia Type:  MAC ASA Status:  2          Anesthesia Type: MAC  Last vitals  BP   132/77 (03/22/18 1207)   Temp   96.9 °F (36.1 °C) (03/22/18 1137)   Pulse   70 (03/22/18 1207)   Resp   20 (03/22/18 1207)     SpO2   95 % (03/22/18 1207)     Post Anesthesia Care and Evaluation    Patient location during evaluation: PACU  Patient participation: complete - patient participated  Level of consciousness: awake  Pain score: 1  Pain management: adequate  Airway patency: patent  Anesthetic complications: No anesthetic complications  PONV Status: controlled  Cardiovascular status: acceptable and stable  Respiratory status: acceptable and room air  Hydration status: acceptable

## 2018-03-22 NOTE — OP NOTE
Gutierrez Garcia  : 1960  MRN: [id number]  CSN: [id number]  Date: 3/22/2018    Operative Report    DILATATION AND CURETTAGE HYSTEROSCOPY      Pre-op Diagnosis:  Cervical polyp [N84.1]   Post-op Diagnosis:  Post-Op Diagnosis Codes:     * Cervical polyp [N84.1]   Procedure: Procedure(s):  DILATATION AND CURETTAGE HYSTEROSCOPY with removal large cervical polyp   Surgeon: Talita Roman M.D.   Assist: KARINA Dodd   Anesthesia: IV sedation with 1% lidocaine paracervical block   Estimated Blood Loss: 10 mls   ABx: none   Specimens:  Endometrial and endocervical polyp   Findings: Large prolapsed endocervical polyp with grossly normal endometrium   Complications: none   Indications:  See H&P     Description of Procedure:  After the appropriate time out and adequate dosing of her anesthesia, the patient had been prepped and draped in the usual sterile fashion.  She was placed in the dorsal lithotomy position using Sen stirrups.  The bladder had been drained with a red rubber catheter per nursing.  A weighted speculum was placed in the vagina.  The anterior lip of the cervix was grasped with a single-tooth tenaculum.  The cervix was injected at the 3 o'clock and 9 o'clock position with 1% lidocaine plain without any complications.  A polyp forceps was used to remove the endocervical polyp.  The cervix was then progressively dilated using Galindo dilators.  Rigid hysteroscopy was then performed with the above findings noted.  Sharp curettings were then obtained with a good cry throughout with tissue retrieved and sent for pathologic specimen.  The cervical tenaculum was removed and the cervix was noted to be hemostatic after the application of 2-0 chromic suture in a figure-of-eight fashion.  All instrument and sponge counts were correct at the end of the procedure.  The patient tolerated the procedure well.  There were no complications.  She was taken to the postoperative recovery room in stable  condition.    Talita Roman M.D.  3/22/2018  11:26 AM

## 2018-03-22 NOTE — DISCHARGE INSTRUCTIONS
Please follow all post op instructions and follow up appointment time from your physician's office included in your discharge packet.  .   Pelvic rest is best described as not putting anything in your vagina. This includes tampons, douching, tub bathing or sexual activity.  No pushing, pulling, tugging,  heavy lifting, or strenuous activity.    No major decision making, driving, or drinking alcoholic beverages for 24 hours. ( due to the medications you have  received)  Always use good hand hygiene/washing techniques.  NO driving while taking pain medications.    To assist you in voiding:  Drink plenty of fluids  Listen to running water while attempting to void.    If you are unable to urinate and you have an uncomfortable urge to void or it has been   6 hours since you were discharged, return to the Emergency Room

## 2018-03-22 NOTE — ANESTHESIA PREPROCEDURE EVALUATION
Anesthesia Evaluation     Patient summary reviewed and Nursing notes reviewed   no history of anesthetic complications:  NPO Solid Status: > 8 hours  NPO Liquid Status: > 8 hours           Airway   Mallampati: III  TM distance: >3 FB  Neck ROM: full  no difficulty expected  Dental - normal exam     Pulmonary - normal exam   (+) sleep apnea on CPAP,   Cardiovascular - normal exam    Rhythm: regular  Rate: normal    (+) hypertension well controlled, valvular problems/murmurs murmur, PVD, hyperlipidemia,       Neuro/Psych  (+) headaches, psychiatric history Anxiety and Depression,     GI/Hepatic/Renal/Endo    (+) obesity, morbid obesity,  diabetes mellitus type 2 well controlled, hypothyroidism,     Musculoskeletal     (+) back pain, chronic pain,   Abdominal    Substance History - negative use     OB/GYN negative ob/gyn ROS         Other   (+) arthritis                     Anesthesia Plan    ASA 2     MAC   (Pt told that intravenous sedation will be used as the primary anesthetic along with local anesthesia if necessary. Every effort will be made to make sure the patient is comfortable.     The patient was told they may or may not have recall for the procedure. It was further explained that if the MAC was not adequate that a general anesthetic with either an LMA or endotracheal tube would be required.     Will proceed with the plan of care.)  intravenous induction   Anesthetic plan and risks discussed with patient.

## 2018-03-23 LAB
25(OH)D3+25(OH)D2 SERPL-MCNC: 32.9 NG/ML (ref 30–100)
A-TOCOPHEROL VIT E SERPL-MCNC: 10.1 MG/L (ref 5.3–16.8)
ALBUMIN SERPL-MCNC: 4.2 G/DL (ref 3.5–5.5)
ALBUMIN/GLOB SERPL: 1.8 {RATIO} (ref 1.2–2.2)
ALP SERPL-CCNC: 98 IU/L (ref 39–117)
ALT SERPL-CCNC: 53 IU/L (ref 0–32)
AST SERPL-CCNC: 23 IU/L (ref 0–40)
BASOPHILS # BLD AUTO: 0.1 X10E3/UL (ref 0–0.2)
BASOPHILS NFR BLD AUTO: 1 %
BILIRUB SERPL-MCNC: 0.3 MG/DL (ref 0–1.2)
BUN SERPL-MCNC: 14 MG/DL (ref 6–24)
BUN/CREAT SERPL: 12 (ref 9–23)
CALCIUM SERPL-MCNC: 9.5 MG/DL (ref 8.7–10.2)
CHLORIDE SERPL-SCNC: 108 MMOL/L (ref 96–106)
CO2 SERPL-SCNC: 23 MMOL/L (ref 18–29)
CREAT SERPL-MCNC: 1.15 MG/DL (ref 0.57–1)
EOSINOPHIL # BLD AUTO: 0.3 X10E3/UL (ref 0–0.4)
EOSINOPHIL NFR BLD AUTO: 3 %
ERYTHROCYTE [DISTWIDTH] IN BLOOD BY AUTOMATED COUNT: 13.8 % (ref 12.3–15.4)
FERRITIN SERPL-MCNC: 45 NG/ML (ref 15–150)
FOLATE SERPL-MCNC: >20 NG/ML
GFR SERPLBLD CREATININE-BSD FMLA CKD-EPI: 53 ML/MIN/1.73
GFR SERPLBLD CREATININE-BSD FMLA CKD-EPI: 61 ML/MIN/1.73
GLOBULIN SER CALC-MCNC: 2.3 G/DL (ref 1.5–4.5)
GLUCOSE SERPL-MCNC: 73 MG/DL (ref 65–99)
HCT VFR BLD AUTO: 40.2 % (ref 34–46.6)
HGB BLD-MCNC: 12.7 G/DL (ref 11.1–15.9)
IMM GRANULOCYTES # BLD: 0 X10E3/UL (ref 0–0.1)
IMM GRANULOCYTES NFR BLD: 0 %
IRON SERPL-MCNC: 66 UG/DL (ref 27–159)
LYMPHOCYTES # BLD AUTO: 2.4 X10E3/UL (ref 0.7–3.1)
LYMPHOCYTES NFR BLD AUTO: 26 %
MAGNESIUM SERPL-MCNC: 2.1 MG/DL (ref 1.6–2.3)
MCH RBC QN AUTO: 28.8 PG (ref 26.6–33)
MCHC RBC AUTO-ENTMCNC: 31.6 G/DL (ref 31.5–35.7)
MCV RBC AUTO: 91 FL (ref 79–97)
METHYLMALONATE SERPL-SCNC: 185 NMOL/L (ref 0–378)
MONOCYTES # BLD AUTO: 0.5 X10E3/UL (ref 0.1–0.9)
MONOCYTES NFR BLD AUTO: 5 %
NEUTROPHILS # BLD AUTO: 6.2 X10E3/UL (ref 1.4–7)
NEUTROPHILS NFR BLD AUTO: 65 %
PHOSPHATE SERPL-MCNC: 3.7 MG/DL (ref 2.5–4.5)
PLATELET # BLD AUTO: 338 X10E3/UL (ref 150–379)
POTASSIUM SERPL-SCNC: 3.7 MMOL/L (ref 3.5–5.2)
PREALB SERPL-MCNC: 25 MG/DL (ref 10–36)
PROT SERPL-MCNC: 6.5 G/DL (ref 6–8.5)
PTH-INTACT SERPL-MCNC: 30 PG/ML (ref 15–65)
RBC # BLD AUTO: 4.41 X10E6/UL (ref 3.77–5.28)
SODIUM SERPL-SCNC: 145 MMOL/L (ref 134–144)
VIT A SERPL-MCNC: 79 UG/DL (ref 20–65)
VIT B1 BLD-SCNC: 176.2 NMOL/L (ref 66.5–200)
WBC # BLD AUTO: 9.6 X10E3/UL (ref 3.4–10.8)
ZINC SERPL-MCNC: 131 UG/DL (ref 56–134)

## 2018-03-27 LAB
LAB AP CASE REPORT: NORMAL
Lab: NORMAL
PATH REPORT.FINAL DX SPEC: NORMAL

## 2018-03-29 ENCOUNTER — OFFICE VISIT (OUTPATIENT)
Dept: OBSTETRICS AND GYNECOLOGY | Facility: CLINIC | Age: 58
End: 2018-03-29

## 2018-03-29 VITALS
BODY MASS INDEX: 45.99 KG/M2 | HEIGHT: 67 IN | SYSTOLIC BLOOD PRESSURE: 126 MMHG | WEIGHT: 293 LBS | DIASTOLIC BLOOD PRESSURE: 80 MMHG

## 2018-03-29 DIAGNOSIS — Z09 POSTOP CHECK: Primary | ICD-10-CM

## 2018-03-29 PROCEDURE — 99212 OFFICE O/P EST SF 10 MIN: CPT | Performed by: PHYSICIAN ASSISTANT

## 2018-03-29 NOTE — PROGRESS NOTES
Subjective   Chief Complaint   Patient presents with   • Follow-up     one week post op D&C Hysteroscopy; doing well       Caty Garcia is a 57 y.o. year old  presenting to be seen for post op visit  Is one week post op D&C hysteroscopy and removal large cervical polyp  Pathology benign  Has done well post op normal bowel and bladder function  No vaginal bleeding  Has no complaints today    Past Medical History:   Diagnosis Date   • Anxiety and depression    • Arthritis     BOTHERSOM IN RIGHT SHOULDER   • Bipolar 1 disorder    • Blind     left eye   • Chronic kidney disease     sees dr narvaez last seen 2 to 3 months ago   • Colon polyp    • Diabetes mellitus    • Disease of thyroid gland    • Heart murmur    • High blood pressure    • High cholesterol    • History of nuclear stress test     REPORTS APX. 6 YEARS AGO AND THAT ALL WAS WNL'S   • Hypothyroid    • Migraine     REPORTS NONE FOR 10+ YEARS   • MRSA (methicillin resistant Staphylococcus aureus) 2017    REPORTS OF LEFT ANKLE AND THAT SHE WAS TREATED   • Sleep apnea     USES BIPAP HS, TO BRING IN DOS   • Wears glasses         Current Outpatient Prescriptions:   •  ARIPiprazole (ABILIFY) 30 MG tablet, TAKE ONE (1) TABLET BY MOUTH AT BEDTIME, Disp: , Rfl: 0  •  buPROPion SR (WELLBUTRIN SR) 150 MG 12 hr tablet, Take 150 mg by mouth Daily., Disp: , Rfl:   •  carvedilol (COREG) 25 MG tablet, TAKE 1 TABLET BY MOUTH TWICE A DAY, Disp: , Rfl: 1  •  diltiaZEM CD (CARDIZEM CD) 240 MG 24 hr capsule, Take 480 mg by mouth Daily., Disp: , Rfl:   •  ergocalciferol (ERGOCALCIFEROL) 81991 UNITS capsule, Take 50,000 Units by mouth 1 (One) Time Per Week. TAKES MEDICATION ON , Disp: , Rfl:   •  furosemide (LASIX) 20 MG tablet, Take 2 tablets by mouth Daily. (Patient taking differently: Take 40 mg by mouth Daily As Needed (MAY TAKE FOR SWELLING AS NEEDED AND CONTACT MD IF NEEDS TO TAKE).), Disp: 60 tablet, Rfl: 3  •  glipiZIDE (GLUCOTROL) 10 MG  "tablet, Take 10 mg by mouth 2 (Two) Times a Day Before Meals., Disp: , Rfl:   •  hydrOXYzine (ATARAX) 25 MG tablet, Take 50 mg by mouth At Night As Needed for Itching., Disp: , Rfl:   •  levothyroxine (SYNTHROID, LEVOTHROID) 150 MCG tablet, Take 150 mcg by mouth Daily., Disp: , Rfl:   •  lithium (ESKALITH) 450 MG CR tablet, Take 450 mg by mouth Every Night., Disp: , Rfl:   •  Multiple Vitamin (MULTI-DAY PO), Take 1 tablet by mouth Daily., Disp: , Rfl:   •  oxybutynin XL (DITROPAN XL) 15 MG 24 hr tablet, TAKE 1 TABLET BY MOUTH AT BEDTIME, Disp: , Rfl: 2  •  potassium chloride (K-DUR,KLOR-CON) 10 MEQ CR tablet, Take 1 tablet by mouth Daily. (Patient taking differently: Take 10 mEq by mouth Daily As Needed (TAKES THIS SUPPLEMENT ONLY WHEN TAKING LASIX).), Disp: , Rfl:   •  promethazine (PHENERGAN) 25 MG tablet, Take 1 tablet by mouth Every 6 (Six) Hours As Needed for Nausea or Vomiting., Disp: 30 tablet, Rfl: 0  •  promethazine (PHENERGAN) 25 MG tablet, Take 1 tablet by mouth every 6 (six) hours as needed for nausea and vomiting., Disp: 30 tablet, Rfl: 0  •  simvastatin (ZOCOR) 40 MG tablet, Take 20 mg by mouth Every Night., Disp: , Rfl:   •  solifenacin (VESICARE) 10 MG tablet, Take 10 mg by mouth Daily., Disp: , Rfl:    Allergies   Allergen Reactions   • Allegra [Fexofenadine] Other (See Comments)     MIGRAINES   • Fluoxetine Hcl Other (See Comments)     MIGRAINES   • Nortriptyline Other (See Comments)     MIGRAINES   • Prozac [Fluoxetine] Other (See Comments)     MIGRAINES     • Trazodone Other (See Comments)     MIGRAINES   • Trazodone And Nefazodone Other (See Comments)     MIGRAINES     • Adhesive Tape Other (See Comments)     \"TEARS MY SKIN\"   • Lamotrigine Rash      Past Surgical History:   Procedure Laterality Date   • CHOLECYSTECTOMY  2007   • COLONOSCOPY  2014   • COLONOSCOPY     • COLONOSCOPY N/A 2/20/2018    Procedure: COLONOSCOPY with hot  snare polypectomies , biopsies, and submucosal injection katy " "ink;  Surgeon: Shravan Tolliver MD;  Location: [facility] ENDOSCOPY;  Service:    • D&C HYSTEROSCOPY N/A 3/22/2018    Procedure: DILATATION AND CURETTAGE HYSTEROSCOPY with removal large cervical polyp;  Surgeon: Talita Roman MD;  Location: [facility] OR;  Service: Obstetrics/Gynecology   • GASTRIC SLEEVE LAPAROSCOPIC  2014   • INTERVENTIONAL RADIOLOGY PROCEDURE N/A 5/26/2017    Procedure: Abdominal Aortagram with Runoff;  Surgeon: Otf Mehta MD;  Location: [facility] CATH INVASIVE LOCATION;  Service:    • JOINT REPLACEMENT Right 2008    HIP REPLACEMENT   • JOINT REPLACEMENT Left 2015    HIP REPLACEMENT   • SKIN BIOPSY Right     SHOULDER   • TOTAL HIP ARTHROPLASTY Right 2008   • UPPER GASTROINTESTINAL ENDOSCOPY     • WISDOM TOOTH EXTRACTION        Social History     Social History   • Marital status: Single     Spouse name: N/A   • Number of children: N/A   • Years of education: N/A     Occupational History   •  Unemployed     Social History Main Topics   • Smoking status: Never Smoker   • Smokeless tobacco: Never Used   • Alcohol use No   • Drug use: No   • Sexual activity: No     Other Topics Concern   • Not on file     Social History Narrative   • No narrative on file      Family History   Problem Relation Age of Onset   • Hypertension Father    • Hyperlipidemia Father    • Diabetes Father    • Heart attack Father    • Liver cancer Father    • Diabetes Brother    • Colon cancer Maternal Grandfather    • Breast cancer Mother        Review of Systems   Constitutional: Negative.    Gastrointestinal: Negative.    Genitourinary: Negative.            Objective   /80   Ht 170.2 cm (67\")   Wt (!) 174 kg (383 lb)   LMP  (LMP Unknown)   Breastfeeding? No   BMI 59.99 kg/m²     Physical Exam         Assessment and Plan  Caty was seen today for follow-up.    Diagnoses and all orders for this visit:    Postop check      There are no Patient Instructions on file for this visit.           This note was " electronically signed.    Adri Valentine PA-C   March 29, 2018

## 2018-04-30 ENCOUNTER — DOCUMENTATION (OUTPATIENT)
Dept: DIABETES SERVICES | Facility: HOSPITAL | Age: 58
End: 2018-04-30

## 2018-07-18 ENCOUNTER — TRANSCRIBE ORDERS (OUTPATIENT)
Dept: LAB | Facility: HOSPITAL | Age: 58
End: 2018-07-18

## 2018-07-18 ENCOUNTER — HOSPITAL ENCOUNTER (OUTPATIENT)
Dept: CARDIOLOGY | Facility: HOSPITAL | Age: 58
Discharge: HOME | End: 2018-07-18
Admitting: NURSE PRACTITIONER

## 2018-07-18 ENCOUNTER — LAB (OUTPATIENT)
Dept: LAB | Facility: HOSPITAL | Age: 58
End: 2018-07-18

## 2018-07-18 DIAGNOSIS — N18.30 CHRONIC KIDNEY DISEASE, STAGE III (MODERATE) (HCC): ICD-10-CM

## 2018-07-18 DIAGNOSIS — Z79.899 DRUG THERAPY: Primary | ICD-10-CM

## 2018-07-18 DIAGNOSIS — N18.30 CHRONIC KIDNEY DISEASE, STAGE III (MODERATE) (HCC): Primary | ICD-10-CM

## 2018-07-18 DIAGNOSIS — Z79.899 DRUG THERAPY: ICD-10-CM

## 2018-07-18 LAB
25(OH)D3 SERPL-MCNC: 25.4 NG/ML
ALBUMIN SERPL-MCNC: 4.3 G/DL (ref 3.5–5)
ALBUMIN/GLOB SERPL: 1.4 G/DL (ref 1–2)
ALP SERPL-CCNC: 114 U/L (ref 38–126)
ALT SERPL W P-5'-P-CCNC: 122 U/L (ref 13–69)
ANION GAP SERPL CALCULATED.3IONS-SCNC: 12 MMOL/L (ref 10–20)
AST SERPL-CCNC: 84 U/L (ref 15–46)
BILIRUB SERPL-MCNC: 0.4 MG/DL (ref 0.2–1.3)
BUN BLD-MCNC: 17 MG/DL (ref 7–20)
BUN/CREAT SERPL: 17 (ref 7.1–23.5)
CALCIUM SPEC-SCNC: 9.7 MG/DL (ref 8.4–10.2)
CHLORIDE SERPL-SCNC: 107 MMOL/L (ref 98–107)
CHOLEST SERPL-MCNC: 174 MG/DL (ref 0–199)
CO2 SERPL-SCNC: 28 MMOL/L (ref 26–30)
CREAT BLD-MCNC: 1 MG/DL (ref 0.6–1.3)
DEPRECATED RDW RBC AUTO: 44.8 FL (ref 37–54)
ERYTHROCYTE [DISTWIDTH] IN BLOOD BY AUTOMATED COUNT: 13.9 % (ref 11.5–14.5)
GFR SERPL CREATININE-BSD FRML MDRD: 57 ML/MIN/1.73
GLOBULIN UR ELPH-MCNC: 3.1 GM/DL
GLUCOSE BLD-MCNC: 97 MG/DL (ref 74–98)
HBA1C MFR BLD: 6.3 % (ref 3–6)
HCT VFR BLD AUTO: 41 % (ref 37–47)
HDLC SERPL-MCNC: 74 MG/DL (ref 40–60)
HGB BLD-MCNC: 13.3 G/DL (ref 12–16)
LDLC SERPL CALC-MCNC: 70 MG/DL (ref 0–99)
LDLC/HDLC SERPL: 0.95 {RATIO}
MCH RBC QN AUTO: 28.8 PG (ref 27–31)
MCHC RBC AUTO-ENTMCNC: 32.4 G/DL (ref 30–37)
MCV RBC AUTO: 88.7 FL (ref 81–99)
PHOSPHATE SERPL-MCNC: 4.1 MG/DL (ref 2.5–4.5)
PLATELET # BLD AUTO: 278 10*3/MM3 (ref 130–400)
PMV BLD AUTO: 9.3 FL (ref 6–12)
POTASSIUM BLD-SCNC: 5 MMOL/L (ref 3.5–5.1)
PROT SERPL-MCNC: 7.4 G/DL (ref 6.3–8.2)
RBC # BLD AUTO: 4.62 10*6/MM3 (ref 4.2–5.4)
SODIUM BLD-SCNC: 142 MMOL/L (ref 137–145)
TRIGL SERPL-MCNC: 148 MG/DL
TSH SERPL DL<=0.05 MIU/L-ACNC: 1.83 MIU/ML (ref 0.47–4.68)
VIT B12 BLD-MCNC: 733 PG/ML (ref 239–931)
VLDLC SERPL-MCNC: 29.6 MG/DL
WBC NRBC COR # BLD: 9.63 10*3/MM3 (ref 4.8–10.8)

## 2018-07-18 PROCEDURE — 80061 LIPID PANEL: CPT

## 2018-07-18 PROCEDURE — 85027 COMPLETE CBC AUTOMATED: CPT

## 2018-07-18 PROCEDURE — 36415 COLL VENOUS BLD VENIPUNCTURE: CPT

## 2018-07-18 PROCEDURE — 84100 ASSAY OF PHOSPHORUS: CPT

## 2018-07-18 PROCEDURE — 82607 VITAMIN B-12: CPT

## 2018-07-18 PROCEDURE — 93005 ELECTROCARDIOGRAM TRACING: CPT | Performed by: NURSE PRACTITIONER

## 2018-07-18 PROCEDURE — 80053 COMPREHEN METABOLIC PANEL: CPT

## 2018-07-18 PROCEDURE — 80178 ASSAY OF LITHIUM: CPT

## 2018-07-18 PROCEDURE — 83036 HEMOGLOBIN GLYCOSYLATED A1C: CPT

## 2018-07-18 PROCEDURE — 84443 ASSAY THYROID STIM HORMONE: CPT

## 2018-07-18 PROCEDURE — 83970 ASSAY OF PARATHORMONE: CPT

## 2018-07-18 PROCEDURE — 82306 VITAMIN D 25 HYDROXY: CPT

## 2018-07-19 LAB
LITHIUM SERPL-SCNC: 0.6 MMOL/L (ref 0.6–1.2)
PTH-INTACT SERPL-MCNC: 65 PG/ML (ref 15–65)

## 2018-09-04 ENCOUNTER — OFFICE VISIT (OUTPATIENT)
Dept: GASTROENTEROLOGY | Facility: CLINIC | Age: 58
End: 2018-09-04

## 2018-09-04 ENCOUNTER — PREP FOR SURGERY (OUTPATIENT)
Dept: OTHER | Facility: HOSPITAL | Age: 58
End: 2018-09-04

## 2018-09-04 VITALS
WEIGHT: 293 LBS | RESPIRATION RATE: 16 BRPM | BODY MASS INDEX: 45.99 KG/M2 | DIASTOLIC BLOOD PRESSURE: 64 MMHG | HEIGHT: 67 IN | HEART RATE: 59 BPM | TEMPERATURE: 96.2 F | SYSTOLIC BLOOD PRESSURE: 133 MMHG

## 2018-09-04 DIAGNOSIS — K63.9 NODULE OF COLON: ICD-10-CM

## 2018-09-04 DIAGNOSIS — K63.9 NODULE OF COLON: Chronic | ICD-10-CM

## 2018-09-04 DIAGNOSIS — Z12.11 COLON CANCER SCREENING: ICD-10-CM

## 2018-09-04 DIAGNOSIS — R19.7 DIARRHEA, UNSPECIFIED TYPE: Primary | Chronic | ICD-10-CM

## 2018-09-04 DIAGNOSIS — R79.89 ELEVATED LIVER FUNCTION TESTS: Chronic | ICD-10-CM

## 2018-09-04 DIAGNOSIS — R19.7 DIARRHEA, UNSPECIFIED TYPE: Primary | ICD-10-CM

## 2018-09-04 DIAGNOSIS — K52.9 COLITIS: Chronic | ICD-10-CM

## 2018-09-04 DIAGNOSIS — K52.9 COLITIS: ICD-10-CM

## 2018-09-04 PROCEDURE — 99214 OFFICE O/P EST MOD 30 MIN: CPT | Performed by: NURSE PRACTITIONER

## 2018-09-04 RX ORDER — FLUTICASONE PROPIONATE 50 MCG
2 SPRAY, SUSPENSION (ML) NASAL DAILY
COMMUNITY

## 2018-09-04 RX ORDER — SODIUM CHLORIDE 9 MG/ML
70 INJECTION, SOLUTION INTRAVENOUS CONTINUOUS PRN
Status: CANCELLED | OUTPATIENT
Start: 2018-09-04

## 2018-09-04 NOTE — PATIENT INSTRUCTIONS
1. Possible non-invasive liver work up in the future.  2. Obtain labs from PCP office.  3. Colonoscopy: Description of the procedure, risks, benefits, alternatives and options, including nonoperative options, were discussed with the patient in detail. The patient understands and wishes to proceed.

## 2018-09-04 NOTE — PROGRESS NOTES
Chief Complaint   Patient presents with   • Follow-up   • Diarrhea     Increased     The patient has a long standing history of diarrhea. The patient has diarrhea 2-3 days per week with 7-10 watery episodes per day. The rest of the week she may have 1-2 soft/loose stools per day. Diarrhea seemed to start after she had gastric bypass. The patient takes Imodium as needed with moderate control of diarrhea. There is urgency and tenesmus at times.The patient denies bright red blood per rectum or melena.    The patient denies abdominal pain, nausea or vomiting. There is no history of heartburn or difficulty swallowing.    Labs in July 2018 with mild elevation of liver enzymes. The patient has not been aware of liver disease in the past. The patient denies IVDA, alcohol use, tattoos or blood transfusions. She is scheduled to have labs in the morning by her PCP. Of interest, the patient has gained 13 pounds since her visit in February 2018.    The patient's last colonoscopy was in February 2018 with colitis. There is a family history of colon cancer in the patient's grandfather.    Diarrhea    This is a chronic problem. Episode onset: 9/2014. Episode frequency: 2-3 days per week with 7-10 episodes per day. The problem has been gradually worsening. Diarrhea characteristics: soft/loose/watery. The patient states that diarrhea awakens her from sleep. Pertinent negatives include no abdominal pain, arthralgias, chills, coughing, fever, headaches, myalgias or vomiting. Nothing aggravates the symptoms. There are no known risk factors. She has tried anti-motility drug for the symptoms. The treatment provided moderate relief.     Review of Systems   Constitutional: Negative for appetite change, chills, fatigue, fever and unexpected weight change.   HENT: Negative for mouth sores, nosebleeds and trouble swallowing.    Eyes: Negative for discharge and redness.        Blind in left eye     Respiratory: Positive for apnea (bipap  machine). Negative for cough and shortness of breath.    Cardiovascular: Negative for chest pain, palpitations and leg swelling.   Gastrointestinal: Positive for diarrhea. Negative for abdominal distention, abdominal pain, anal bleeding, blood in stool, constipation, nausea and vomiting.   Endocrine: Negative for cold intolerance, heat intolerance and polydipsia.   Genitourinary: Negative for dysuria, hematuria and urgency.        Yeast infection due to recent antibx.   Musculoskeletal: Negative for arthralgias, joint swelling and myalgias.   Skin: Negative for rash.   Allergic/Immunologic: Negative for food allergies and immunocompromised state.   Neurological: Negative for dizziness, seizures, syncope and headaches.   Hematological: Negative for adenopathy. Does not bruise/bleed easily.   Psychiatric/Behavioral: Negative for dysphoric mood. The patient is not nervous/anxious and is not hyperactive.      Patient Active Problem List   Diagnosis   • Rash   • Back pain   • Mood changes (CMS/HCC)   • Bipolar disorder (CMS/HCC)   • Acute renal failure (CMS/HCC)   • Cellulitis of left lower extremity   • Chronic venous insufficiency   • Essential hypertension   • Acquired hypothyroidism   • Dyslipidemia   • Type 2 diabetes mellitus without complication, without long-term current use of insulin (CMS/HCC)   • Venous stasis dermatitis of both lower extremities   • Overactive bladder   • GERD with esophagitis   • Cellulitis   • Morbid obesity with BMI of 60.0-69.9, adult (CMS/HCC)   • Declining functional status   • Hypokalemia   • Morbid obesity (CMS/HCC)   • Colon cancer screening   • Diarrhea   • Colitis   • Nodule of colon   • Elevated liver function tests     Past Medical History:   Diagnosis Date   • Anxiety and depression    • Arthritis     BOTHERSOM IN RIGHT SHOULDER   • Bipolar 1 disorder (CMS/HCC)    • Blind     left eye   • Chronic kidney disease     sees dr narvaez last seen 2 to 3 months ago   • Colon polyp 2014    • Colon polyp 02/20/2018   • Diabetes mellitus (CMS/HCC)    • Disease of thyroid gland    • Heart murmur    • High blood pressure    • High cholesterol    • History of nuclear stress test     REPORTS APX. 6 YEARS AGO AND THAT ALL WAS WNL'S   • Hypothyroid    • Migraine     REPORTS NONE FOR 10+ YEARS   • MRSA (methicillin resistant Staphylococcus aureus) 06/2017    REPORTS OF LEFT ANKLE AND THAT SHE WAS TREATED   • Sleep apnea 2011    USES BIPAP HS, TO BRING IN DOS   • Wears glasses      Past Surgical History:   Procedure Laterality Date   • CHOLECYSTECTOMY  2007   • COLONOSCOPY  2014   • COLONOSCOPY     • COLONOSCOPY N/A 2/20/2018    Procedure: COLONOSCOPY with hot  snare polypectomies , biopsies, and submucosal injection katy ink;  Surgeon: Shravan Tolliver MD;  Location: [facility] ENDOSCOPY;  Service:    • D&C HYSTEROSCOPY N/A 3/22/2018    Procedure: DILATATION AND CURETTAGE HYSTEROSCOPY with removal large cervical polyp;  Surgeon: Talita Roman MD;  Location: [facility] OR;  Service: Obstetrics/Gynecology   • GASTRIC SLEEVE LAPAROSCOPIC  2014   • INTERVENTIONAL RADIOLOGY PROCEDURE N/A 5/26/2017    Procedure: Abdominal Aortagram with Runoff;  Surgeon: Otf Mehta MD;  Location: [facility] CATH INVASIVE LOCATION;  Service:    • JOINT REPLACEMENT Right 2008    HIP REPLACEMENT   • JOINT REPLACEMENT Left 2015    HIP REPLACEMENT   • SKIN BIOPSY Right     SHOULDER   • TOTAL HIP ARTHROPLASTY Right 2008   • UPPER GASTROINTESTINAL ENDOSCOPY     • WISDOM TOOTH EXTRACTION       Family History   Problem Relation Age of Onset   • Hypertension Father    • Hyperlipidemia Father    • Diabetes Father    • Heart attack Father    • Liver cancer Father    • Diabetes Brother    • Colon cancer Maternal Grandfather    • Breast cancer Mother      Social History   Substance Use Topics   • Smoking status: Never Smoker   • Smokeless tobacco: Never Used   • Alcohol use No       Current Outpatient Prescriptions:   •  ARIPiprazole (ABILIFY)  30 MG tablet, TAKE ONE (1) TABLET BY MOUTH AT BEDTIME, Disp: , Rfl: 0  •  buPROPion SR (WELLBUTRIN SR) 150 MG 12 hr tablet, Take 150 mg by mouth Daily., Disp: , Rfl:   •  carvedilol (COREG) 25 MG tablet, TAKE 1 TABLET BY MOUTH TWICE A DAY, Disp: , Rfl: 1  •  diltiaZEM CD (CARDIZEM CD) 240 MG 24 hr capsule, Take 480 mg by mouth Daily., Disp: , Rfl:   •  ergocalciferol (ERGOCALCIFEROL) 81458 UNITS capsule, Take 50,000 Units by mouth 1 (One) Time Per Week. TAKES MEDICATION ON Sunday, Disp: , Rfl:   •  fluticasone (FLONASE) 50 MCG/ACT nasal spray, 2 sprays into the nostril(s) as directed by provider Daily., Disp: , Rfl:   •  furosemide (LASIX) 20 MG tablet, Take 2 tablets by mouth Daily. (Patient taking differently: Take 40 mg by mouth Daily As Needed (MAY TAKE FOR SWELLING AS NEEDED AND CONTACT MD IF NEEDS TO TAKE).), Disp: 60 tablet, Rfl: 3  •  glipiZIDE (GLUCOTROL) 10 MG tablet, Take 10 mg by mouth 2 (Two) Times a Day Before Meals., Disp: , Rfl:   •  hydrOXYzine (ATARAX) 25 MG tablet, Take 50 mg by mouth At Night As Needed for Itching., Disp: , Rfl:   •  levothyroxine (SYNTHROID, LEVOTHROID) 150 MCG tablet, Take 150 mcg by mouth Daily., Disp: , Rfl:   •  lithium (ESKALITH) 450 MG CR tablet, Take 450 mg by mouth Every Night., Disp: , Rfl:   •  Mirabegron (MYRBETRIQ PO), Take 1 capsule by mouth Daily., Disp: , Rfl:   •  Multiple Vitamin (MULTI-DAY PO), Take 1 tablet by mouth Daily., Disp: , Rfl:   •  potassium chloride (K-DUR,KLOR-CON) 10 MEQ CR tablet, Take 1 tablet by mouth Daily. (Patient taking differently: Take 10 mEq by mouth Daily As Needed (TAKES THIS SUPPLEMENT ONLY WHEN TAKING LASIX).), Disp: , Rfl:   •  simvastatin (ZOCOR) 40 MG tablet, Take 20 mg by mouth Every Night., Disp: , Rfl:     Allergies   Allergen Reactions   • Allegra [Fexofenadine] Other (See Comments)     MIGRAINES   • Fluoxetine Hcl Other (See Comments)     MIGRAINES   • Nortriptyline Other (See Comments)     MIGRAINES   • Prozac [Fluoxetine]  "Other (See Comments)     MIGRAINES     • Trazodone Other (See Comments)     MIGRAINES   • Trazodone And Nefazodone Other (See Comments)     MIGRAINES     • Adhesive Tape Other (See Comments)     \"TEARS MY SKIN\"   • Lamotrigine Rash     Blood pressure 133/64, pulse 59, temperature 96.2 °F (35.7 °C), resp. rate 16, height 170.2 cm (67\"), weight (!) 179 kg (395 lb), not currently breastfeeding.    Physical Exam   Constitutional: She is oriented to person, place, and time. She appears well-developed and well-nourished. No distress.   HENT:   Head: Normocephalic and atraumatic.   Right Ear: Hearing and external ear normal.   Left Ear: Hearing and external ear normal.   Nose: Nose normal.   Mouth/Throat: Oropharynx is clear and moist and mucous membranes are normal. Mucous membranes are not pale, not dry and not cyanotic. No oral lesions. No oropharyngeal exudate.   Eyes: Conjunctivae and EOM are normal. Right eye exhibits no discharge. Left eye exhibits no discharge.   Neck: Trachea normal. Neck supple. No JVD present. No edema present. No thyroid mass and no thyromegaly present.   Cardiovascular: Normal rate, regular rhythm, S2 normal and normal heart sounds.  Exam reveals no gallop, no S3 and no friction rub.    No murmur heard.  Pulmonary/Chest: Effort normal and breath sounds normal. No respiratory distress. She exhibits no tenderness.   Abdominal: Normal appearance and bowel sounds are normal. She exhibits no distension, no ascites and no mass. There is no splenomegaly or hepatomegaly. There is no tenderness. There is no rigidity, no rebound and no guarding. No hernia.     Vascular Status -  Her right foot exhibits no edema. Her left foot exhibits no edema.  Lymphadenopathy:     She has no cervical adenopathy.        Left: No supraclavicular adenopathy present.   Neurological: She is alert and oriented to person, place, and time. She has normal strength. No cranial nerve deficit or sensory deficit.   Skin: No rash " noted. She is not diaphoretic. No cyanosis. No pallor. Nails show no clubbing.   Psychiatric: She has a normal mood and affect.   Nursing note and vitals reviewed.  Stigmata of chronic liver disease:  None.  Asterixis:  None.    Laboratory Testing:  Upon review of medical records:        Dated February 5, 2018 sodium 147 potassium 4.0 chloride 110 CO2 24 BUN 19 serum creatinine 1.30 glucose 123.  Calcium 9.5.  Total protein 7.4.  Albumin 4.40.  T bili 0.5 AST 33 ALT 69 alkaline phosphatase 99.  GGT 20.     Dated 7/18/2018 glucose 97 BUN 17 creatinine 1.0 sodium 142 potassium 5.0 chloride 107 CO2 28 calcium 9.7 albumin 4.3  AST 80 alkaline phosphatase 114 total bilirubin 0.4 WBC 9.63 hemoglobin 13.3 hematocrit 41.0 platelet count 278 MCV 88.7 PTH 65 lithium 0.6 TSH 1.830 hemoglobin A1c 6.3 vitamin B12 733 vitamin D 25.4 phosphorus 4.1     Procedures:    Upon review of records:     Dated February 20, 2018 the patient underwent a colonoscopy to the terminal ileum which revealed: Diverticulosis involving the left colon and transverse colon.  Focal areas of colitis-erythema and granularity in the cecum and ascending colon.  Scant vascular ectasia in the right colon.  Colon polyps.  5 mm submucosal nodule in the descending colon at 40 cm from anal verge which may represent carcinoid nodule. Multiple biopsies were obtained to include submucosal tissue.  Internal hemorrhoids.  Cecum, biopsies revealed chronic active colitis with crypt abscesses compatible with idiopathic inflammatory bowel disease.  Ascending colon, biopsies revealed chronic active colitis compatible with idiopathic inflammatory bowel disease. Transverse colon, polyps, biopsies revealed tubular adenomas.  Chronic active colitis with crypt abscesses compatible with idiopathic inflammatory bowel disease.  Proximal descending colon, submucosal nodule at 40 cm, biopsies revealed serrated polyp compatible with sessile serrated adenoma.  Negative for  submucosa.  Distal rectum, polypoid area, biopsies revealed hyperplastic polyp.  Rectal polyp, biopsies revealed hyperplastic polyp.  Transverse colon, polyp #2, biopsies revealed tubular adenoma.    Assessment:      ICD-10-CM ICD-9-CM   1. Diarrhea, unspecified type R19.7 787.91   2. Colitis K52.9 558.9   3. Nodule of colon K63.9 569.89   4. Elevated liver function tests R79.89 790.6   5. Colon cancer screening Z12.11 V76.51       Discussion:  1. Long-standing history of intermittent episodic diarrhea.  Biopsies suggestive of underlying inflammatory bowel disease.  2. Nodule at 40 cm from anal verge suggestive of possible carcinoid.  Biopsies negative at that time.  3. Recent labs with elevated liver function tests.  He has not been aware of elevated liver enzymes in the past.  Of interest, patient has gained 13 pounds since her visit in February 2018.    Plan/  Patient Instructions   1. Possible non-invasive liver work up in the future.  2. Obtain labs from PCP office.  3. Colonoscopy: Description of the procedure, risks, benefits, alternatives and options, including nonoperative options, were discussed with the patient in detail. The patient understands and wishes to proceed.     LAURENCE García

## 2018-09-17 ENCOUNTER — OFFICE VISIT (OUTPATIENT)
Dept: BARIATRICS/WEIGHT MGMT | Facility: CLINIC | Age: 58
End: 2018-09-17

## 2018-09-17 VITALS
RESPIRATION RATE: 18 BRPM | HEIGHT: 67 IN | HEART RATE: 73 BPM | DIASTOLIC BLOOD PRESSURE: 80 MMHG | BODY MASS INDEX: 45.99 KG/M2 | SYSTOLIC BLOOD PRESSURE: 128 MMHG | OXYGEN SATURATION: 99 % | TEMPERATURE: 97.5 F | WEIGHT: 293 LBS

## 2018-09-17 DIAGNOSIS — I10 ESSENTIAL HYPERTENSION: Chronic | ICD-10-CM

## 2018-09-17 DIAGNOSIS — R53.83 FATIGUE, UNSPECIFIED TYPE: ICD-10-CM

## 2018-09-17 DIAGNOSIS — E66.01 MORBID OBESITY WITH BMI OF 60.0-69.9, ADULT (HCC): ICD-10-CM

## 2018-09-17 DIAGNOSIS — E66.9 DIABETES MELLITUS TYPE 2 IN OBESE (HCC): ICD-10-CM

## 2018-09-17 DIAGNOSIS — R79.0 ABNORMAL BLOOD LEVEL OF IRON: ICD-10-CM

## 2018-09-17 DIAGNOSIS — E67.0 HIGH VITAMIN A LEVEL: Primary | ICD-10-CM

## 2018-09-17 DIAGNOSIS — E55.9 VITAMIN D DEFICIENCY: ICD-10-CM

## 2018-09-17 DIAGNOSIS — E11.69 DIABETES MELLITUS TYPE 2 IN OBESE (HCC): ICD-10-CM

## 2018-09-17 DIAGNOSIS — Z98.84 S/P BARIATRIC SURGERY: ICD-10-CM

## 2018-09-17 PROCEDURE — 99214 OFFICE O/P EST MOD 30 MIN: CPT | Performed by: PHYSICIAN ASSISTANT

## 2018-09-17 PROCEDURE — 94690 O2 UPTK REST INDIRECT: CPT | Performed by: PHYSICIAN ASSISTANT

## 2018-09-17 NOTE — PROGRESS NOTES
[facility] Bariatric Surgery  [address]  [facility] [id number] [phone]        Patient Name: Caty Garcia.  YOB: 1960      Date of Visit: 9/17/2018      Reason for Visit:  Annual Eval - 4 years postop    HPI:  Caty Garcia is a 58 y.o. female s/p LSG w reduction/repair of paraesophageal lipomatous HH / liver bx (mild steatosis) by GDW on 9/10/14    LOV 3/2018 - saw Dr. Verde.  Discussed guidelines/goals for getting back on track.  Unfortunately continues to gain.  Says has made dietary/lifestyle modifications since last visit, but remains frustrated.      [facility]  Has increased fruits/vegetables.   Getting 75g prot/day.  Not tracking calories.    Last bariatric labs 3/2018 - high Vit A but o/w okay.  Recent labs 7/2018 - low Vit D (25).  Taking MVI.    Presurgery weight: 428 pounds. Today's weight is (!) 177 kg (391 lb 0.2 oz) pounds, today's  Body mass index is 61.24 kg/m²., and her weight loss since surgery is 37 pounds.         Past Medical History:   Diagnosis Date   • Anxiety and depression    • Arthritis     BOTHERSOM IN RIGHT SHOULDER   • Bipolar 1 disorder (CMS/HCC)    • Blind     left eye   • Chronic kidney disease     sees dr narvaez last seen 2 to 3 months ago   • Colon polyp 2014   • Colon polyp 02/20/2018   • Diabetes mellitus (CMS/HCC)    • Disease of thyroid gland    • Heart murmur    • High blood pressure    • High cholesterol    • History of nuclear stress test     REPORTS APX. 6 YEARS AGO AND THAT ALL WAS WNL'S   • Hypothyroid    • Migraine     REPORTS NONE FOR 10+ YEARS   • MRSA (methicillin resistant Staphylococcus aureus) 06/2017    REPORTS OF LEFT ANKLE AND THAT SHE WAS TREATED   • Sleep apnea 2011    USES BIPAP HS, TO BRING IN DOS   • Wears glasses      Past Surgical History:   Procedure Laterality Date   • CHOLECYSTECTOMY  2007   • COLONOSCOPY  2014   • COLONOSCOPY     • COLONOSCOPY N/A  2/20/2018    Procedure: COLONOSCOPY with hot  snare polypectomies , biopsies, and submucosal injection katy ink;  Surgeon: Shravan Tolliver MD;  Location: [facility] ENDOSCOPY;  Service:    • D&C HYSTEROSCOPY N/A 3/22/2018    Procedure: DILATATION AND CURETTAGE HYSTEROSCOPY with removal large cervical polyp;  Surgeon: Talita Roman MD;  Location: [facility] OR;  Service: Obstetrics/Gynecology   • GASTRIC SLEEVE LAPAROSCOPIC  2014   • INTERVENTIONAL RADIOLOGY PROCEDURE N/A 5/26/2017    Procedure: Abdominal Aortagram with Runoff;  Surgeon: Otf Mehta MD;  Location: [facility] CATH INVASIVE LOCATION;  Service:    • JOINT REPLACEMENT Right 2008    HIP REPLACEMENT   • JOINT REPLACEMENT Left 2015    HIP REPLACEMENT   • SKIN BIOPSY Right     SHOULDER   • TOTAL HIP ARTHROPLASTY Right 2008   • UPPER GASTROINTESTINAL ENDOSCOPY     • WISDOM TOOTH EXTRACTION       Outpatient Prescriptions Marked as Taking for the 9/17/18 encounter (Office Visit) with Camille Jules PA   Medication Sig Dispense Refill   • ARIPiprazole (ABILIFY) 30 MG tablet TAKE ONE (1) TABLET BY MOUTH AT BEDTIME  0   • buPROPion SR (WELLBUTRIN SR) 150 MG 12 hr tablet Take 150 mg by mouth Daily.     • carvedilol (COREG) 25 MG tablet TAKE 1 TABLET BY MOUTH TWICE A DAY  1   • diltiaZEM CD (CARDIZEM CD) 240 MG 24 hr capsule Take 480 mg by mouth Daily.     • ergocalciferol (ERGOCALCIFEROL) 49815 UNITS capsule Take 50,000 Units by mouth 1 (One) Time Per Week. TAKES MEDICATION ON Sunday     • fluticasone (FLONASE) 50 MCG/ACT nasal spray 2 sprays into the nostril(s) as directed by provider Daily.     • furosemide (LASIX) 20 MG tablet Take 2 tablets by mouth Daily. (Patient taking differently: Take 40 mg by mouth Daily As Needed (MAY TAKE FOR SWELLING AS NEEDED AND CONTACT MD IF NEEDS TO TAKE).) 60 tablet 3   • glipiZIDE (GLUCOTROL) 10 MG tablet Take 10 mg by mouth 2 (Two) Times a Day Before Meals.     • hydrOXYzine (ATARAX) 25 MG tablet Take 50 mg by mouth At Night  "As Needed for Itching.     • levothyroxine (SYNTHROID, LEVOTHROID) 150 MCG tablet Take 150 mcg by mouth Daily.     • lithium (ESKALITH) 450 MG CR tablet Take 450 mg by mouth Every Night.     • Mirabegron (MYRBETRIQ PO) Take 1 capsule by mouth Daily.     • Multiple Vitamin (MULTI-DAY PO) Take 1 tablet by mouth Daily.     • potassium chloride (K-DUR,KLOR-CON) 10 MEQ CR tablet Take 1 tablet by mouth Daily. (Patient taking differently: Take 10 mEq by mouth Daily As Needed (TAKES THIS SUPPLEMENT ONLY WHEN TAKING LASIX).)     • simvastatin (ZOCOR) 40 MG tablet Take 20 mg by mouth Every Night.       Allergies   Allergen Reactions   • Allegra [Fexofenadine] Other (See Comments)     MIGRAINES   • Fluoxetine Hcl Other (See Comments)     MIGRAINES   • Nortriptyline Other (See Comments)     MIGRAINES   • Prozac [Fluoxetine] Other (See Comments)     MIGRAINES     • Trazodone Other (See Comments)     MIGRAINES   • Trazodone And Nefazodone Other (See Comments)     MIGRAINES     • Adhesive Tape Other (See Comments)     \"TEARS MY SKIN\"   • Lamotrigine Rash       Social History     Social History   • Marital status: Single     Spouse name: N/A   • Number of children: N/A   • Years of education: N/A     Occupational History   •  Unemployed     Social History Main Topics   • Smoking status: Never Smoker   • Smokeless tobacco: Never Used   • Alcohol use No   • Drug use: No   • Sexual activity: No     Other Topics Concern   • Not on file     Social History Narrative   • No narrative on file       Vitals:    09/17/18 0939   BP: 128/80   Pulse: 73   Resp: 18   Temp: 97.5 °F (36.4 °C)   SpO2: 99%     Weight (!) 177 kg (391 lb 0.2 oz)  Body mass index is 61.24 kg/m².    Physical Exam   Constitutional: She appears well-developed and well-nourished.   Cardiovascular: Normal rate.    Pulmonary/Chest: Effort normal.   Musculoskeletal:   ambulating w/ cane   Neurological: She is alert.   Psychiatric: She has a normal mood and affect. Judgment and " thought content normal.         Assessment:  4 years s/p LSG w reduction/repair of paraesophageal lipomatous HH / liver bx (mild steatosis) by GDW on 9/10/14      ICD-10-CM ICD-9-CM   1. High vitamin A level E67.0 278.2   2. Fatigue, unspecified type R53.83 780.79   3. Vitamin D deficiency E55.9 268.9   4. Abnormal blood level of iron R79.0 790.6   5. Diabetes mellitus type 2 in obese (CMS/HCC) E11.69 250.00    E66.9 278.00   6. Essential hypertension I10 401.9   7. Morbid obesity with BMI of 60.0-69.9, adult (CMS/[facility]) E66.01 278.01    Z68.44 V85.44   8. S/P bariatric surgery Z98.84 V45.86       Plan:  BMR today to determine WLZ.  Start tracking intake.  Focus on high protein, good food choices.  Adjust calorie intake according to BMR - eat w/in WLZ.  Continue routine exercise.  Routine labs ordered.  Continue current vitamin regimen w/ adjustments pending lab results.  Call w/ issues/concerns.      The patient was instructed to follow up in 3 months.     Total time spent w/ patient 25 minutes and 15 minutes spent counseling the patient on nutrition and necessary dietary/lifestyle modifications.    Addendum:  BMR WLZ [id number].  Advised 1700 michael/day.

## 2018-09-22 LAB
25(OH)D3+25(OH)D2 SERPL-MCNC: 26.8 NG/ML
ALBUMIN SERPL-MCNC: 4.45 G/DL (ref 3.2–4.8)
ALBUMIN/GLOB SERPL: 2.1 G/DL (ref 1.5–2.5)
ALP SERPL-CCNC: 106 U/L (ref 25–100)
ALT SERPL-CCNC: 140 U/L (ref 7–40)
AST SERPL-CCNC: 94 U/L (ref 0–33)
BASOPHILS # BLD AUTO: 0.06 10*3/MM3 (ref 0–0.2)
BASOPHILS NFR BLD AUTO: 0.6 % (ref 0–1)
BILIRUB SERPL-MCNC: 0.4 MG/DL (ref 0.3–1.2)
BUN SERPL-MCNC: 22 MG/DL (ref 9–23)
BUN/CREAT SERPL: 15 (ref 7–25)
CALCIUM SERPL-MCNC: 9.3 MG/DL (ref 8.7–10.4)
CHLORIDE SERPL-SCNC: 104 MMOL/L (ref 99–109)
CO2 SERPL-SCNC: 27 MMOL/L (ref 20–31)
CREAT SERPL-MCNC: 1.47 MG/DL (ref 0.6–1.3)
EOSINOPHIL # BLD AUTO: 0.44 10*3/MM3 (ref 0–0.3)
EOSINOPHIL NFR BLD AUTO: 4.7 % (ref 0–3)
ERYTHROCYTE [DISTWIDTH] IN BLOOD BY AUTOMATED COUNT: 13.6 % (ref 11.3–14.5)
FERRITIN SERPL-MCNC: 48 NG/ML (ref 10–291)
FOLATE SERPL-MCNC: 17.48 NG/ML (ref 3.2–20)
GLOBULIN SER CALC-MCNC: 2.2 GM/DL
GLUCOSE SERPL-MCNC: 102 MG/DL (ref 70–100)
HCT VFR BLD AUTO: 42 % (ref 34.5–44)
HGB BLD-MCNC: 12.8 G/DL (ref 11.5–15.5)
IMM GRANULOCYTES # BLD: 0.02 10*3/MM3 (ref 0–0.03)
IMM GRANULOCYTES NFR BLD: 0.2 % (ref 0–0.6)
IRON SERPL-MCNC: 100 MCG/DL (ref 50–175)
LYMPHOCYTES # BLD AUTO: 2.87 10*3/MM3 (ref 0.6–4.8)
LYMPHOCYTES NFR BLD AUTO: 30.8 % (ref 24–44)
Lab: NORMAL
MCH RBC QN AUTO: 28.4 PG (ref 27–31)
MCHC RBC AUTO-ENTMCNC: 30.5 G/DL (ref 32–36)
MCV RBC AUTO: 93.3 FL (ref 80–99)
METHYLMALONATE SERPL-SCNC: 272 NMOL/L (ref 0–378)
MONOCYTES # BLD AUTO: 0.62 10*3/MM3 (ref 0–1)
MONOCYTES NFR BLD AUTO: 6.7 % (ref 0–12)
NEUTROPHILS # BLD AUTO: 5.31 10*3/MM3 (ref 1.5–8.3)
NEUTROPHILS NFR BLD AUTO: 57 % (ref 41–71)
PLATELET # BLD AUTO: 313 10*3/MM3 (ref 150–450)
POTASSIUM SERPL-SCNC: 5.4 MMOL/L (ref 3.5–5.5)
PREALB SERPL-MCNC: 30 MG/DL (ref 10–36)
PROT SERPL-MCNC: 6.6 G/DL (ref 5.7–8.2)
RBC # BLD AUTO: 4.5 10*6/MM3 (ref 3.89–5.14)
SODIUM SERPL-SCNC: 140 MMOL/L (ref 132–146)
VIT A SERPL-MCNC: 69.4 UG/DL (ref 33.1–100)
VIT B1 BLD-SCNC: 190.7 NMOL/L (ref 66.5–200)
WBC # BLD AUTO: 9.32 10*3/MM3 (ref 3.5–10.8)

## 2018-09-25 RX ORDER — ERGOCALCIFEROL 1.25 MG/1
50000 CAPSULE ORAL WEEKLY
Qty: 12 CAPSULE | Refills: 0 | Status: SHIPPED | OUTPATIENT
Start: 2018-09-25

## 2018-10-01 ENCOUNTER — ANESTHESIA EVENT (OUTPATIENT)
Dept: GASTROENTEROLOGY | Facility: HOSPITAL | Age: 58
End: 2018-10-01

## 2018-10-01 ENCOUNTER — HOSPITAL ENCOUNTER (OUTPATIENT)
Facility: HOSPITAL | Age: 58
Setting detail: HOSPITAL OUTPATIENT SURGERY
Discharge: HOME | End: 2018-10-01
Attending: INTERNAL MEDICINE | Admitting: INTERNAL MEDICINE

## 2018-10-01 ENCOUNTER — ANESTHESIA (OUTPATIENT)
Dept: GASTROENTEROLOGY | Facility: HOSPITAL | Age: 58
End: 2018-10-01

## 2018-10-01 VITALS
SYSTOLIC BLOOD PRESSURE: 125 MMHG | DIASTOLIC BLOOD PRESSURE: 90 MMHG | TEMPERATURE: 97 F | WEIGHT: 293 LBS | OXYGEN SATURATION: 100 % | RESPIRATION RATE: 18 BRPM | HEART RATE: 51 BPM | BODY MASS INDEX: 45.99 KG/M2 | HEIGHT: 67 IN

## 2018-10-01 DIAGNOSIS — Z12.11 COLON CANCER SCREENING: ICD-10-CM

## 2018-10-01 DIAGNOSIS — R19.7 DIARRHEA, UNSPECIFIED TYPE: ICD-10-CM

## 2018-10-01 DIAGNOSIS — K63.9 NODULE OF COLON: ICD-10-CM

## 2018-10-01 DIAGNOSIS — K52.9 COLITIS: ICD-10-CM

## 2018-10-01 LAB — GLUCOSE BLDC GLUCOMTR-MCNC: 141 MG/DL (ref 70–130)

## 2018-10-01 PROCEDURE — 45385 COLONOSCOPY W/LESION REMOVAL: CPT | Performed by: INTERNAL MEDICINE

## 2018-10-01 PROCEDURE — 45380 COLONOSCOPY AND BIOPSY: CPT | Performed by: INTERNAL MEDICINE

## 2018-10-01 PROCEDURE — 82962 GLUCOSE BLOOD TEST: CPT

## 2018-10-01 PROCEDURE — 25010000002 PROPOFOL 200 MG/20ML EMULSION: Performed by: NURSE ANESTHETIST, CERTIFIED REGISTERED

## 2018-10-01 PROCEDURE — 45381 COLONOSCOPY SUBMUCOUS NJX: CPT | Performed by: INTERNAL MEDICINE

## 2018-10-01 RX ORDER — PROPOFOL 10 MG/ML
INJECTION, EMULSION INTRAVENOUS AS NEEDED
Status: DISCONTINUED | OUTPATIENT
Start: 2018-10-01 | End: 2018-10-01 | Stop reason: SURG

## 2018-10-01 RX ORDER — SODIUM CHLORIDE 9 MG/ML
70 INJECTION, SOLUTION INTRAVENOUS CONTINUOUS PRN
Status: DISCONTINUED | OUTPATIENT
Start: 2018-10-01 | End: 2018-10-01 | Stop reason: HOSPADM

## 2018-10-01 RX ORDER — SODIUM CHLORIDE 0.9 % (FLUSH) 0.9 %
3 SYRINGE (ML) INJECTION AS NEEDED
Status: DISCONTINUED | OUTPATIENT
Start: 2018-10-01 | End: 2018-10-01 | Stop reason: HOSPADM

## 2018-10-01 RX ORDER — CHOLESTYRAMINE LIGHT 4 G/5.7G
4 POWDER, FOR SUSPENSION ORAL NIGHTLY
Qty: 210 G | Refills: 1 | Status: SHIPPED | OUTPATIENT
Start: 2018-10-01

## 2018-10-01 RX ADMIN — PROPOFOL 50 MG: 10 INJECTION, EMULSION INTRAVENOUS at 13:50

## 2018-10-01 RX ADMIN — PROPOFOL 20 MG: 10 INJECTION, EMULSION INTRAVENOUS at 14:08

## 2018-10-01 RX ADMIN — PROPOFOL 50 MG: 10 INJECTION, EMULSION INTRAVENOUS at 13:27

## 2018-10-01 RX ADMIN — PROPOFOL 50 MG: 10 INJECTION, EMULSION INTRAVENOUS at 13:48

## 2018-10-01 RX ADMIN — PROPOFOL 10 MG: 10 INJECTION, EMULSION INTRAVENOUS at 14:12

## 2018-10-01 RX ADMIN — PROPOFOL 50 MG: 10 INJECTION, EMULSION INTRAVENOUS at 13:44

## 2018-10-01 RX ADMIN — PROPOFOL 50 MG: 10 INJECTION, EMULSION INTRAVENOUS at 13:38

## 2018-10-01 RX ADMIN — SODIUM CHLORIDE 70 ML/HR: 9 INJECTION, SOLUTION INTRAVENOUS at 10:25

## 2018-10-01 RX ADMIN — PROPOFOL 50 MG: 10 INJECTION, EMULSION INTRAVENOUS at 14:00

## 2018-10-01 RX ADMIN — PROPOFOL 50 MG: 10 INJECTION, EMULSION INTRAVENOUS at 13:35

## 2018-10-01 RX ADMIN — PROPOFOL 50 MG: 10 INJECTION, EMULSION INTRAVENOUS at 13:29

## 2018-10-01 RX ADMIN — PROPOFOL 50 MG: 10 INJECTION, EMULSION INTRAVENOUS at 13:32

## 2018-10-01 RX ADMIN — PROPOFOL 50 MG: 10 INJECTION, EMULSION INTRAVENOUS at 13:55

## 2018-10-01 RX ADMIN — PROPOFOL 50 MG: 10 INJECTION, EMULSION INTRAVENOUS at 14:04

## 2018-10-01 RX ADMIN — PROPOFOL 50 MG: 10 INJECTION, EMULSION INTRAVENOUS at 13:40

## 2018-10-01 NOTE — INTERVAL H&P NOTE
"  H&P reviewed. The patient was examined and there are no changes to the H&P.       No recent shortness of breath or chest pains.      Blood pressure 123/56, pulse 55, temperature 97.5 °F (36.4 °C), temperature source Temporal Artery , resp. rate 20, height 170.2 cm (67\"), weight (!) 177 kg (391 lb), last menstrual period 09/28/2013, SpO2 98 %, not currently breastfeeding.    Chest: clear to auscultation.  Cardiac exam: No S3 no murmurs.   Abdomen: soft bowel sounds present nondistended nontender.      "

## 2018-10-01 NOTE — OP NOTE
PROCEDURE:  Colonoscopy to the terminal ileum with one hot snare and one cold snare and one cold biopsy polypectomies as well as submucosal injection of Arielle ink for marking and biopsies     DATE OF PROCEDURE:  October 1, 2018.  REFERRING PROVIDER:  Luis Bhatt DO     INSTRUMENT USED:   Olympus PCF H 190 videocolonoscope.      INDICATIONS OF THE PROCEDURE:   This is a 58-year-old white female for colon cancer screening. The patient has diarrhea. There is history of colitis, colon polyps-tubular adenomata and colonic nodule. There is 1 second-degree relative with colon cancer.    PREVIOUS COLONOSCOPY: February 2018.    BIOPSIES:  Biopsies were obtained from the terminal ileum. Cecum one cold biopsy polypectomy. Descending colon: 1 hot snare polypectomy at 58 cm. Biopsies were obtained from the splenic flexure-area of erythema. Rectum: A cold biopsy polypectomy.      PHOTOGRAPHS:  Photographs were included in the medical records.     MEDICATIONS:  MAC.       CONSENT/PREPROCEDURE EVALUATION:  Risks, benefits, alternatives and options of the procedure including risks of sedation/anesthesia were discussed with the patient and informed consent was obtained prior to the procedure.  History and physical examination were performed and nothing precluded the test.      REPORT:  The patient was placed in left lateral decubitus position and a digital examination was performed.  Once under the influence of IV sedation, the instrument was inserted into the rectum and advanced under direct vision to cecum which was identified by the ileocecal valve, triradiate folds and appendiceal orifice. The scope was then maneuvered into the terminal ileum.        FINDINGS:      Digital rectal examination:  Good anal tone.  No perianal pathology.  No mass.        Terminal ileum:  7-8 cm.  Normal. Mucosa was noted to be somewhat flat. Biopsies were obtained.     Cecum and ascending colon: A 3 mm sessile polypoid area was noted in the  cecum. This was removed with cold biopsy forceps.      Hepatic flexure, transverse colon, splenic flexure:  Normal.         Descending colon, sigmoid colon and rectum: In the descending colon at 58 cm from anal verge a 6 mm sessile polypoid area was noted. This was removed with hot snare. Submucosal injection of Arielle ink 0.5 ml was undertaken for marking. Diverticulosis was noted in the left colon. A 5 mm sessile polyp in the rectum was removed with cold snare.  No endoscopic evidence of colitis was seen.  Random biopsies were obtained from the colon upon withdrawal of the scope.  A retroflex examination within the rectum revealed internal hemorrhoids.        The scope was then straightened, the lower GI tract was decompressed, and the scope was pulled out of the patient.  The patient tolerated the procedure well.  There were no immediate complications and the patient was transferred in stable condition for post procedure observation.      TECHNICAL DATA:   1. [place] prep score: 8 (3+2+3).    2. Anus to cecal time: 11 minutes.  3. Difficulty of examination:  Average.    4. Withdrawal time: 15 minutes.  5. Procedure time: 43 minutes   6. Retroflex examination in right colon: Yes.    7. Second look Rectum to cecum with decompression: Yes.    DIAGNOSES:    1. Left-sided diverticulosis.  2. Focal erythema at splenic flexure.  3. Colon polyps. 3-6 mm in size. 3 were removed.  4. Internal hemorrhoids.  5. No convincing evidence of active colitis or ileitis. Random biopsies were also obtained from the colon upon withdrawal of the scope.      RECOMMENDATIONS:     1. Dietary instructions.  2. Follow biopsies.    3. Follow-up: 4 weeks.   4. Followup colonoscopy:  Depending on the biopsy results.       Thank you very much for letting me participate in the care of this patient. Please do not hesitate to call me if you have any questions.

## 2018-10-01 NOTE — ANESTHESIA POSTPROCEDURE EVALUATION
Patient: Caty Garcia    Procedure Summary     Date:  10/01/18 Room / Location:  [facility] ENDOSCOPY 2 / [facility] ENDOSCOPY    Anesthesia Start:  1313 Anesthesia Stop:  1419    Procedure:  COLONOSCOPY COLD BIOPSY POLYPECTOMY, COLD BIOPSY, COLD AND HOT SNARE POLYPECTOMY (N/A Anus) Diagnosis:       Colitis      Colon cancer screening      Nodule of colon      Diarrhea, unspecified type      (Colitis [K52.9])      (Colon cancer screening [Z12.11])      (Nodule of colon [K63.9])      (Diarrhea, unspecified type [R19.7])    Surgeon:  Shravan Tolliver MD Provider:  Kendall Avila CRNA    Anesthesia Type:  MAC ASA Status:  2          Anesthesia Type: MAC  Last vitals  BP   106/46 (10/01/18 1429)   Temp   97 °F (36.1 °C) (10/01/18 1429)   Pulse   (!) 41 (10/01/18 1429)   Resp   14 (10/01/18 1429)     SpO2   96 % (10/01/18 1429)     Post Anesthesia Care and Evaluation    Patient location during evaluation: bedside  Patient participation: complete - patient participated  Level of consciousness: awake and sleepy but conscious  Pain score: 0  Pain management: adequate  Airway patency: patent  Anesthetic complications: No anesthetic complications  PONV Status: none  Cardiovascular status: acceptable  Respiratory status: acceptable  Hydration status: acceptable

## 2018-10-01 NOTE — H&P (VIEW-ONLY)
Chief Complaint   Patient presents with   • Follow-up   • Diarrhea     Increased     The patient has a long standing history of diarrhea. The patient has diarrhea 2-3 days per week with 7-10 watery episodes per day. The rest of the week she may have 1-2 soft/loose stools per day. Diarrhea seemed to start after she had gastric bypass. The patient takes Imodium as needed with moderate control of diarrhea. There is urgency and tenesmus at times.The patient denies bright red blood per rectum or melena.    The patient denies abdominal pain, nausea or vomiting. There is no history of heartburn or difficulty swallowing.    Labs in July 2018 with mild elevation of liver enzymes. The patient has not been aware of liver disease in the past. The patient denies IVDA, alcohol use, tattoos or blood transfusions. She is scheduled to have labs in the morning by her PCP. Of interest, the patient has gained 13 pounds since her visit in February 2018.    The patient's last colonoscopy was in February 2018 with colitis. There is a family history of colon cancer in the patient's grandfather.    Diarrhea    This is a chronic problem. Episode onset: 9/2014. Episode frequency: 2-3 days per week with 7-10 episodes per day. The problem has been gradually worsening. Diarrhea characteristics: soft/loose/watery. The patient states that diarrhea awakens her from sleep. Pertinent negatives include no abdominal pain, arthralgias, chills, coughing, fever, headaches, myalgias or vomiting. Nothing aggravates the symptoms. There are no known risk factors. She has tried anti-motility drug for the symptoms. The treatment provided moderate relief.     Review of Systems   Constitutional: Negative for appetite change, chills, fatigue, fever and unexpected weight change.   HENT: Negative for mouth sores, nosebleeds and trouble swallowing.    Eyes: Negative for discharge and redness.        Blind in left eye     Respiratory: Positive for apnea (bipap  machine). Negative for cough and shortness of breath.    Cardiovascular: Negative for chest pain, palpitations and leg swelling.   Gastrointestinal: Positive for diarrhea. Negative for abdominal distention, abdominal pain, anal bleeding, blood in stool, constipation, nausea and vomiting.   Endocrine: Negative for cold intolerance, heat intolerance and polydipsia.   Genitourinary: Negative for dysuria, hematuria and urgency.        Yeast infection due to recent antibx.   Musculoskeletal: Negative for arthralgias, joint swelling and myalgias.   Skin: Negative for rash.   Allergic/Immunologic: Negative for food allergies and immunocompromised state.   Neurological: Negative for dizziness, seizures, syncope and headaches.   Hematological: Negative for adenopathy. Does not bruise/bleed easily.   Psychiatric/Behavioral: Negative for dysphoric mood. The patient is not nervous/anxious and is not hyperactive.      Patient Active Problem List   Diagnosis   • Rash   • Back pain   • Mood changes (CMS/HCC)   • Bipolar disorder (CMS/HCC)   • Acute renal failure (CMS/HCC)   • Cellulitis of left lower extremity   • Chronic venous insufficiency   • Essential hypertension   • Acquired hypothyroidism   • Dyslipidemia   • Type 2 diabetes mellitus without complication, without long-term current use of insulin (CMS/HCC)   • Venous stasis dermatitis of both lower extremities   • Overactive bladder   • GERD with esophagitis   • Cellulitis   • Morbid obesity with BMI of 60.0-69.9, adult (CMS/HCC)   • Declining functional status   • Hypokalemia   • Morbid obesity (CMS/HCC)   • Colon cancer screening   • Diarrhea   • Colitis   • Nodule of colon   • Elevated liver function tests     Past Medical History:   Diagnosis Date   • Anxiety and depression    • Arthritis     BOTHERSOM IN RIGHT SHOULDER   • Bipolar 1 disorder (CMS/HCC)    • Blind     left eye   • Chronic kidney disease     sees dr narvaez last seen 2 to 3 months ago   • Colon polyp 2014    • Colon polyp 02/20/2018   • Diabetes mellitus (CMS/HCC)    • Disease of thyroid gland    • Heart murmur    • High blood pressure    • High cholesterol    • History of nuclear stress test     REPORTS APX. 6 YEARS AGO AND THAT ALL WAS WNL'S   • Hypothyroid    • Migraine     REPORTS NONE FOR 10+ YEARS   • MRSA (methicillin resistant Staphylococcus aureus) 06/2017    REPORTS OF LEFT ANKLE AND THAT SHE WAS TREATED   • Sleep apnea 2011    USES BIPAP HS, TO BRING IN DOS   • Wears glasses      Past Surgical History:   Procedure Laterality Date   • CHOLECYSTECTOMY  2007   • COLONOSCOPY  2014   • COLONOSCOPY     • COLONOSCOPY N/A 2/20/2018    Procedure: COLONOSCOPY with hot  snare polypectomies , biopsies, and submucosal injection katy ink;  Surgeon: Shravan Tolliver MD;  Location: [facility] ENDOSCOPY;  Service:    • D&C HYSTEROSCOPY N/A 3/22/2018    Procedure: DILATATION AND CURETTAGE HYSTEROSCOPY with removal large cervical polyp;  Surgeon: Talita Roman MD;  Location: [facility] OR;  Service: Obstetrics/Gynecology   • GASTRIC SLEEVE LAPAROSCOPIC  2014   • INTERVENTIONAL RADIOLOGY PROCEDURE N/A 5/26/2017    Procedure: Abdominal Aortagram with Runoff;  Surgeon: Otf Mehta MD;  Location: [facility] CATH INVASIVE LOCATION;  Service:    • JOINT REPLACEMENT Right 2008    HIP REPLACEMENT   • JOINT REPLACEMENT Left 2015    HIP REPLACEMENT   • SKIN BIOPSY Right     SHOULDER   • TOTAL HIP ARTHROPLASTY Right 2008   • UPPER GASTROINTESTINAL ENDOSCOPY     • WISDOM TOOTH EXTRACTION       Family History   Problem Relation Age of Onset   • Hypertension Father    • Hyperlipidemia Father    • Diabetes Father    • Heart attack Father    • Liver cancer Father    • Diabetes Brother    • Colon cancer Maternal Grandfather    • Breast cancer Mother      Social History   Substance Use Topics   • Smoking status: Never Smoker   • Smokeless tobacco: Never Used   • Alcohol use No       Current Outpatient Prescriptions:   •  ARIPiprazole (ABILIFY)  30 MG tablet, TAKE ONE (1) TABLET BY MOUTH AT BEDTIME, Disp: , Rfl: 0  •  buPROPion SR (WELLBUTRIN SR) 150 MG 12 hr tablet, Take 150 mg by mouth Daily., Disp: , Rfl:   •  carvedilol (COREG) 25 MG tablet, TAKE 1 TABLET BY MOUTH TWICE A DAY, Disp: , Rfl: 1  •  diltiaZEM CD (CARDIZEM CD) 240 MG 24 hr capsule, Take 480 mg by mouth Daily., Disp: , Rfl:   •  ergocalciferol (ERGOCALCIFEROL) 16205 UNITS capsule, Take 50,000 Units by mouth 1 (One) Time Per Week. TAKES MEDICATION ON Sunday, Disp: , Rfl:   •  fluticasone (FLONASE) 50 MCG/ACT nasal spray, 2 sprays into the nostril(s) as directed by provider Daily., Disp: , Rfl:   •  furosemide (LASIX) 20 MG tablet, Take 2 tablets by mouth Daily. (Patient taking differently: Take 40 mg by mouth Daily As Needed (MAY TAKE FOR SWELLING AS NEEDED AND CONTACT MD IF NEEDS TO TAKE).), Disp: 60 tablet, Rfl: 3  •  glipiZIDE (GLUCOTROL) 10 MG tablet, Take 10 mg by mouth 2 (Two) Times a Day Before Meals., Disp: , Rfl:   •  hydrOXYzine (ATARAX) 25 MG tablet, Take 50 mg by mouth At Night As Needed for Itching., Disp: , Rfl:   •  levothyroxine (SYNTHROID, LEVOTHROID) 150 MCG tablet, Take 150 mcg by mouth Daily., Disp: , Rfl:   •  lithium (ESKALITH) 450 MG CR tablet, Take 450 mg by mouth Every Night., Disp: , Rfl:   •  Mirabegron (MYRBETRIQ PO), Take 1 capsule by mouth Daily., Disp: , Rfl:   •  Multiple Vitamin (MULTI-DAY PO), Take 1 tablet by mouth Daily., Disp: , Rfl:   •  potassium chloride (K-DUR,KLOR-CON) 10 MEQ CR tablet, Take 1 tablet by mouth Daily. (Patient taking differently: Take 10 mEq by mouth Daily As Needed (TAKES THIS SUPPLEMENT ONLY WHEN TAKING LASIX).), Disp: , Rfl:   •  simvastatin (ZOCOR) 40 MG tablet, Take 20 mg by mouth Every Night., Disp: , Rfl:     Allergies   Allergen Reactions   • Allegra [Fexofenadine] Other (See Comments)     MIGRAINES   • Fluoxetine Hcl Other (See Comments)     MIGRAINES   • Nortriptyline Other (See Comments)     MIGRAINES   • Prozac [Fluoxetine]  "Other (See Comments)     MIGRAINES     • Trazodone Other (See Comments)     MIGRAINES   • Trazodone And Nefazodone Other (See Comments)     MIGRAINES     • Adhesive Tape Other (See Comments)     \"TEARS MY SKIN\"   • Lamotrigine Rash     Blood pressure 133/64, pulse 59, temperature 96.2 °F (35.7 °C), resp. rate 16, height 170.2 cm (67\"), weight (!) 179 kg (395 lb), not currently breastfeeding.    Physical Exam   Constitutional: She is oriented to person, place, and time. She appears well-developed and well-nourished. No distress.   HENT:   Head: Normocephalic and atraumatic.   Right Ear: Hearing and external ear normal.   Left Ear: Hearing and external ear normal.   Nose: Nose normal.   Mouth/Throat: Oropharynx is clear and moist and mucous membranes are normal. Mucous membranes are not pale, not dry and not cyanotic. No oral lesions. No oropharyngeal exudate.   Eyes: Conjunctivae and EOM are normal. Right eye exhibits no discharge. Left eye exhibits no discharge.   Neck: Trachea normal. Neck supple. No JVD present. No edema present. No thyroid mass and no thyromegaly present.   Cardiovascular: Normal rate, regular rhythm, S2 normal and normal heart sounds.  Exam reveals no gallop, no S3 and no friction rub.    No murmur heard.  Pulmonary/Chest: Effort normal and breath sounds normal. No respiratory distress. She exhibits no tenderness.   Abdominal: Normal appearance and bowel sounds are normal. She exhibits no distension, no ascites and no mass. There is no splenomegaly or hepatomegaly. There is no tenderness. There is no rigidity, no rebound and no guarding. No hernia.     Vascular Status -  Her right foot exhibits no edema. Her left foot exhibits no edema.  Lymphadenopathy:     She has no cervical adenopathy.        Left: No supraclavicular adenopathy present.   Neurological: She is alert and oriented to person, place, and time. She has normal strength. No cranial nerve deficit or sensory deficit.   Skin: No rash " noted. She is not diaphoretic. No cyanosis. No pallor. Nails show no clubbing.   Psychiatric: She has a normal mood and affect.   Nursing note and vitals reviewed.  Stigmata of chronic liver disease:  None.  Asterixis:  None.    Laboratory Testing:  Upon review of medical records:        Dated February 5, 2018 sodium 147 potassium 4.0 chloride 110 CO2 24 BUN 19 serum creatinine 1.30 glucose 123.  Calcium 9.5.  Total protein 7.4.  Albumin 4.40.  T bili 0.5 AST 33 ALT 69 alkaline phosphatase 99.  GGT 20.     Dated 7/18/2018 glucose 97 BUN 17 creatinine 1.0 sodium 142 potassium 5.0 chloride 107 CO2 28 calcium 9.7 albumin 4.3  AST 80 alkaline phosphatase 114 total bilirubin 0.4 WBC 9.63 hemoglobin 13.3 hematocrit 41.0 platelet count 278 MCV 88.7 PTH 65 lithium 0.6 TSH 1.830 hemoglobin A1c 6.3 vitamin B12 733 vitamin D 25.4 phosphorus 4.1     Procedures:    Upon review of records:     Dated February 20, 2018 the patient underwent a colonoscopy to the terminal ileum which revealed: Diverticulosis involving the left colon and transverse colon.  Focal areas of colitis-erythema and granularity in the cecum and ascending colon.  Scant vascular ectasia in the right colon.  Colon polyps.  5 mm submucosal nodule in the descending colon at 40 cm from anal verge which may represent carcinoid nodule. Multiple biopsies were obtained to include submucosal tissue.  Internal hemorrhoids.  Cecum, biopsies revealed chronic active colitis with crypt abscesses compatible with idiopathic inflammatory bowel disease.  Ascending colon, biopsies revealed chronic active colitis compatible with idiopathic inflammatory bowel disease. Transverse colon, polyps, biopsies revealed tubular adenomas.  Chronic active colitis with crypt abscesses compatible with idiopathic inflammatory bowel disease.  Proximal descending colon, submucosal nodule at 40 cm, biopsies revealed serrated polyp compatible with sessile serrated adenoma.  Negative for  submucosa.  Distal rectum, polypoid area, biopsies revealed hyperplastic polyp.  Rectal polyp, biopsies revealed hyperplastic polyp.  Transverse colon, polyp #2, biopsies revealed tubular adenoma.    Assessment:      ICD-10-CM ICD-9-CM   1. Diarrhea, unspecified type R19.7 787.91   2. Colitis K52.9 558.9   3. Nodule of colon K63.9 569.89   4. Elevated liver function tests R79.89 790.6   5. Colon cancer screening Z12.11 V76.51       Discussion:  1. Long-standing history of intermittent episodic diarrhea.  Biopsies suggestive of underlying inflammatory bowel disease.  2. Nodule at 40 cm from anal verge suggestive of possible carcinoid.  Biopsies negative at that time.  3. Recent labs with elevated liver function tests.  He has not been aware of elevated liver enzymes in the past.  Of interest, patient has gained 13 pounds since her visit in February 2018.    Plan/  Patient Instructions   1. Possible non-invasive liver work up in the future.  2. Obtain labs from PCP office.  3. Colonoscopy: Description of the procedure, risks, benefits, alternatives and options, including nonoperative options, were discussed with the patient in detail. The patient understands and wishes to proceed.     LAURENCE García

## 2018-10-01 NOTE — ANESTHESIA PREPROCEDURE EVALUATION
Anesthesia Evaluation     Patient summary reviewed and Nursing notes reviewed   no history of anesthetic complications:  NPO Solid Status: > 8 hours  NPO Liquid Status: > 8 hours           Airway   Mallampati: III  TM distance: >3 FB  Neck ROM: full  no difficulty expected  Dental - normal exam     Pulmonary - normal exam   (+) sleep apnea on CPAP,   Cardiovascular - normal exam    Patient on routine beta blocker and Beta blocker given within 24 hours of surgery  Rhythm: regular  Rate: normal    (+) hypertension well controlled 2 medications or greater, valvular problems/murmurs murmur, PVD, hyperlipidemia,       Neuro/Psych  (+) headaches, psychiatric history Anxiety and Depression,     GI/Hepatic/Renal/Endo    (+) obesity, morbid obesity,  diabetes mellitus type 2 well controlled, hypothyroidism,     Musculoskeletal     (+) back pain, chronic pain,   Abdominal    Substance History - negative use     OB/GYN negative ob/gyn ROS         Other   (+) arthritis                       Anesthesia Plan    ASA 2     MAC   (Pt told that intravenous sedation will be used as the primary anesthetic along with local anesthesia if necessary. Every effort will be made to make sure the patient is comfortable.     The patient was told they may or may not have recall for the procedure. It was further explained that if the MAC was not adequate that a general anesthetic with either an LMA or endotracheal tube would be required.     Will proceed with the plan of care.)  intravenous induction   Anesthetic plan, all risks, benefits, and alternatives have been provided, discussed and informed consent has been obtained with: patient.

## 2018-10-05 NOTE — DISCHARGE INSTRUCTIONS
Rest today  No pushing,pulling,tugging,heavy lifting, or strenuous activity   No major decision making,driving,or drinking alcoholic beverages for 24 hours due to the sedation you received  Always use good hand hygiene/washing technique  No driving on pain medication.      To assist you in voiding:  Drink plenty of fluids  Listen to running water while attempting to void.    If you are unable to urinate and you have an uncomfortable urge to void or it has been   6 hours since you were discharged, return to the Emergency Room.      ************************************************************************************        Postprocedure instructions:    Nothing by mouth to fully alert.  Once fully alert may have clear liquid diet.  Advance diet as tolerated.  Vital signs as routine.    Diet:     Avoid Dairy Products.    Blood Thinner Directions:    Avoid Aspirin & other NSAIDS for _7__ days. Tylenol is okay.    Treatments:    Questran light. Take 1/4 scoop at night. The dose may be increased to 1/2, 2/3 and even 1 scoop at night. Adjust the dose to have 1-2 soft stools a day. Do not get constipated.        Other Instructions:    Call [facility] at [phone] or come to the Emergency Department if you experience the following: Chest pain, abdominal pain, bleeding (vomiting of blood or coffee colored material, black stools or sae blood in stools), fever/chills, nausea and vomiting or dizziness.      Follow-up:  DR. THEODORE TOLLIVER in 4 weeks.Office phone # [phone].    Follow-up colonoscopy: Depending on the biopsy results.    ************************************************************************************    Notes to the patient and the family from Dr. Tolliver.     Dear patient/family member,    Today your colon was examined extensively from rectum to cecum and beyond into the small intestine twice.     Findings on today's procedure are as follows:    Diverticulosis. These are benign outpouchings in  the colon.   Small Colon polyps. 3 were found and removed.   No cancer. No inflammation or colitis. No suggestion of Crohn's disease.  Polypoid area in the left side of the colon which was biopsied. This area has been marked with Arielle ink.    Recommendations:    1. As above.   2. The patient should take thyroid medication-thyroxine first thing in the morning on empty stomach. Wait for half hour then eat.  3. The patient should take Questran light   At night before going to bed.    Should you have more questions please do not hesitate to talk to the nurse who can call me and let me talk to you.      I hope you feel better.    Shravan Tolliver M.D., FACP, FACG.

## 2018-10-09 LAB
LAB AP CASE REPORT: NORMAL
PATH REPORT.FINAL DX SPEC: NORMAL

## 2018-10-11 ENCOUNTER — OFFICE VISIT (OUTPATIENT)
Dept: UROLOGY | Facility: CLINIC | Age: 58
End: 2018-10-11

## 2018-10-11 VITALS
BODY MASS INDEX: 45.99 KG/M2 | HEART RATE: 77 BPM | OXYGEN SATURATION: 98 % | TEMPERATURE: 98 F | HEIGHT: 67 IN | WEIGHT: 293 LBS

## 2018-10-11 DIAGNOSIS — N76.2 ATROPHIC VULVITIS: ICD-10-CM

## 2018-10-11 DIAGNOSIS — N32.81 OVERACTIVE BLADDER: ICD-10-CM

## 2018-10-11 DIAGNOSIS — Z87.440 HISTORY OF UTI: Primary | ICD-10-CM

## 2018-10-11 LAB
BILIRUB BLD-MCNC: NEGATIVE MG/DL
CLARITY, POC: CLEAR
COLOR UR: YELLOW
GLUCOSE UR STRIP-MCNC: NEGATIVE MG/DL
KETONES UR QL: NEGATIVE
LEUKOCYTE EST, POC: NEGATIVE
NITRITE UR-MCNC: NEGATIVE MG/ML
PH UR: 6 [PH] (ref 5–8)
PROT UR STRIP-MCNC: NEGATIVE MG/DL
RBC # UR STRIP: NEGATIVE /UL
SP GR UR: 1 (ref 1–1.03)
UROBILINOGEN UR QL: NORMAL

## 2018-10-11 PROCEDURE — 99213 OFFICE O/P EST LOW 20 MIN: CPT | Performed by: UROLOGY

## 2018-10-11 PROCEDURE — 81003 URINALYSIS AUTO W/O SCOPE: CPT | Performed by: UROLOGY

## 2018-10-11 RX ORDER — METOCLOPRAMIDE 10 MG/1
TABLET ORAL
Refills: 0 | COMMUNITY
Start: 2018-09-12 | End: 2018-11-26

## 2018-10-11 RX ORDER — MAGNESIUM CITRATE
SOLUTION, ORAL ORAL
Refills: 0 | COMMUNITY
Start: 2018-09-12 | End: 2018-11-26

## 2018-10-11 RX ORDER — BISACODYL 5 MG/1
TABLET, DELAYED RELEASE ORAL
Refills: 0 | COMMUNITY
Start: 2018-09-12 | End: 2018-11-26

## 2018-10-11 RX ORDER — MIRABEGRON 50 MG/1
50 TABLET, FILM COATED, EXTENDED RELEASE ORAL DAILY
Refills: 3 | COMMUNITY
Start: 2018-09-11

## 2018-10-11 NOTE — PROGRESS NOTES
Chief Complaint  History of UTI (Yearly follow up.)      CRISELDA RomanoCatystuart Garcia is a 58 y.o.female who returns today for follow-up of her overactive bladder but causes frequency urgency and urge incontinence.  This is compounded by her morbid obesity since she carries her weight in her lower abdomen putting a lot of pressure on her bladder.  She has gotten off her caffeine completely.  She was formerly taking Ditropan or Vesicare in addition to Myrbetriq but stopped them because she didn't consider them effective.  She is reminded that she can take either one of those with the Myrbetriq for additional benefit if need be.    She has a long history of recurrent urinary tract infections that are primarily based on voided urine samples that are typically contaminated.  He has a chronic vaginal discharge from atrophic vaginitis that has improved on Estrace vaginal cream.  Her voided urine today is clear but frequently its contaminated yet clear on catheter specimen.    Vitals:    10/11/18 0902   Pulse: 77   Temp: 98 °F (36.7 °C)   SpO2: 98%       Past Medical History  Past Medical History:   Diagnosis Date   • Anxiety and depression    • Arthritis     BOTHERSOM IN RIGHT SHOULDER   • Bipolar 1 disorder (CMS/HCC)    • Blind     left eye   • Chronic kidney disease     sees dr narvaez    • Colon polyp 2014   • Diabetes mellitus (CMS/HCC)    • Disease of thyroid gland    • Heart murmur    • High blood pressure    • High cholesterol    • History of nuclear stress test     REPORTS APX. 6 YEARS AGO AND THAT ALL WAS WNL'S   • Hypothyroid    • Migraine     REPORTS NONE FOR 10+ YEARS   • MRSA (methicillin resistant Staphylococcus aureus) 05/2017    REPORTS OF LEFT ANKLE AND THAT SHE WAS TREATED   • Nocturia    • Sleep apnea 2011    USES BIPAP HS, TO BRING IN DOS   • Wears glasses        Past Surgical History  Past Surgical History:   Procedure Laterality Date   • CHOLECYSTECTOMY  2007   • COLONOSCOPY  2014   • COLONOSCOPY     •  COLONOSCOPY N/A 2/20/2018    Procedure: COLONOSCOPY with hot  snare polypectomies , biopsies, and submucosal injection katy ink;  Surgeon: Shravan Tolliver MD;  Location: [facility] ENDOSCOPY;  Service:    • COLONOSCOPY N/A 10/1/2018    Procedure: COLONOSCOPY COLD BIOPSY POLYPECTOMY, COLD BIOPSY, COLD AND HOT SNARE POLYPECTOMY, TATTOO INJECTION;  Surgeon: Shravan Tolliver MD;  Location: [facility] ENDOSCOPY;  Service: Gastroenterology   • D&C HYSTEROSCOPY N/A 3/22/2018    Procedure: DILATATION AND CURETTAGE HYSTEROSCOPY with removal large cervical polyp;  Surgeon: Talita Roman MD;  Location: [facility] OR;  Service: Obstetrics/Gynecology   • GASTRIC SLEEVE LAPAROSCOPIC  2014   • HIP ARTHROPLASTY Left 2015   • INTERVENTIONAL RADIOLOGY PROCEDURE N/A 5/26/2017    Procedure: Abdominal Aortagram with Runoff;  Surgeon: Otf Mehta MD;  Location: [facility] CATH INVASIVE LOCATION;  Service:    • JOINT REPLACEMENT Right 2008    HIP REPLACEMENT   • JOINT REPLACEMENT Left 2015    HIP REPLACEMENT   • SKIN BIOPSY Right     SHOULDER   • TOTAL HIP ARTHROPLASTY Right 2008   • UPPER GASTROINTESTINAL ENDOSCOPY     • WISDOM TOOTH EXTRACTION         Medications  has a current medication list which includes the following prescription(s): aripiprazole, bupropion sr, carvedilol, cholestyramine light, diltiazem cd, fluticasone, furosemide, glipizide, gnp magnesium citrate, hydroxyzine, levothyroxine, lithium, metoclopramide, multiple vitamin, myrbetriq, one touch ultra test, potassium chloride, simvastatin, stimulant laxative, and vitamin d.    Allergies  Allergies   Allergen Reactions   • Allegra [Fexofenadine] Other (See Comments)     MIGRAINES   • Fluoxetine Hcl Other (See Comments)     MIGRAINES   • Nortriptyline Other (See Comments)     MIGRAINES   • Prozac [Fluoxetine] Other (See Comments)     MIGRAINES     • Trazodone Other (See Comments)     MIGRAINES   • Trazodone And Nefazodone Other (See Comments)     MIGRAINES     • Adhesive Tape  "Other (See Comments)     \"TEARS MY SKIN\"   • Lamotrigine Rash       Social History  Social History     Social History   • Marital status: Single     Spouse name: N/A   • Number of children: N/A   • Years of education: N/A     Occupational History   •  Unemployed     Social History Main Topics   • Smoking status: Never Smoker   • Smokeless tobacco: Never Used   • Alcohol use No   • Drug use: No   • Sexual activity: Defer     Other Topics Concern   • Not on file     Social History Narrative   • No narrative on file       Family History  Family History   Problem Relation Age of Onset   • Hypertension Father    • Hyperlipidemia Father    • Diabetes Father    • Heart attack Father    • Liver cancer Father    • Diabetes Brother    • Colon cancer Maternal Grandfather    • Breast cancer Mother        Review of Systems  Review of Systems   Constitutional: Negative.    Genitourinary: Positive for frequency and urgency.   All other systems reviewed and are negative.      Physical Exam  Physical Exam    Labs recent and today in the office:  Results for orders placed or performed in visit on 10/11/18   POC Urinalysis Dipstick, Automated   Result Value Ref Range    Color Yellow Yellow, Straw, Dark Yellow, Demi    Clarity, UA Clear Clear    Specific Gravity  1.005 1.005 - 1.030    pH, Urine 6.0 5.0 - 8.0    Leukocytes Negative Negative    Nitrite, UA Negative Negative    Protein, POC Negative Negative mg/dL    Glucose, UA Negative Negative, 1000 mg/dL (3+) mg/dL    Ketones, UA Negative Negative    Urobilinogen, UA Normal Normal    Bilirubin Negative Negative    Blood, UA Negative Negative         Assessment & Plan  #1 overactive bladder: She'll continue the Myrbetriq and caffeine restriction and can had Ditropan or Vesicare in the future if need be.  She is morbidly obese with most weight resting on her bladder and she is informed weight loss might help her bladder capacity is well.    #2 atrophic vaginitis: Topical Estrace " vaginal cream is recommended    #3 recurrent urinary tract infection: This must always be documented with a catheter specimen since voided urines are inaccurate in this particular patient.  Her voided urine today however is clear

## 2018-10-15 ENCOUNTER — OFFICE VISIT (OUTPATIENT)
Dept: GASTROENTEROLOGY | Facility: CLINIC | Age: 58
End: 2018-10-15

## 2018-10-15 VITALS — WEIGHT: 293 LBS | RESPIRATION RATE: 18 BRPM | HEIGHT: 67 IN | BODY MASS INDEX: 45.99 KG/M2 | TEMPERATURE: 97.8 F

## 2018-10-15 DIAGNOSIS — K57.30 DIVERTICULOSIS OF COLON WITHOUT HEMORRHAGE: ICD-10-CM

## 2018-10-15 DIAGNOSIS — R74.8 ABNORMAL LEVELS OF OTHER SERUM ENZYMES: ICD-10-CM

## 2018-10-15 DIAGNOSIS — Z11.59 ENCOUNTER FOR SCREENING FOR OTHER VIRAL DISEASES: ICD-10-CM

## 2018-10-15 DIAGNOSIS — E66.3 OVER WEIGHT: ICD-10-CM

## 2018-10-15 DIAGNOSIS — R79.89 ABNORMAL LIVER FUNCTION TESTS: ICD-10-CM

## 2018-10-15 DIAGNOSIS — D12.6 ADENOMATOUS POLYP OF COLON, UNSPECIFIED PART OF COLON: ICD-10-CM

## 2018-10-15 DIAGNOSIS — K52.9 COLITIS: ICD-10-CM

## 2018-10-15 DIAGNOSIS — R19.7 DIARRHEA, UNSPECIFIED TYPE: Primary | ICD-10-CM

## 2018-10-15 PROCEDURE — 99214 OFFICE O/P EST MOD 30 MIN: CPT | Performed by: INTERNAL MEDICINE

## 2018-10-15 NOTE — PROGRESS NOTES
Chief Complaint   Patient presents with   • Follow-up     colon       HPI     Review of Systems   Constitutional: Negative for appetite change, chills, fatigue, fever and unexpected weight change.   HENT: Negative for mouth sores, nosebleeds and trouble swallowing.    Eyes: Negative for discharge and redness.   Respiratory: Positive for apnea. Negative for cough and shortness of breath.    Cardiovascular: Negative for chest pain, palpitations and leg swelling.   Gastrointestinal: Negative for abdominal distention, abdominal pain, anal bleeding, blood in stool, constipation, diarrhea, nausea and vomiting.   Endocrine: Negative for cold intolerance, heat intolerance and polydipsia.   Genitourinary: Negative for dysuria, hematuria and urgency.   Musculoskeletal: Positive for arthralgias. Negative for joint swelling and myalgias.   Skin: Negative for rash.   Allergic/Immunologic: Negative for food allergies and immunocompromised state.   Neurological: Positive for headaches. Negative for dizziness, seizures and syncope.   Hematological: Negative for adenopathy. Does not bruise/bleed easily.   Psychiatric/Behavioral: Negative for dysphoric mood. The patient is not nervous/anxious and is not hyperactive.      Patient Active Problem List   Diagnosis   • Rash   • Back pain   • Mood changes   • Bipolar disorder (CMS/HCC)   • Acute renal failure (CMS/HCC)   • Cellulitis of left lower extremity   • Chronic venous insufficiency   • Essential hypertension   • Acquired hypothyroidism   • Dyslipidemia   • Type 2 diabetes mellitus without complication, without long-term current use of insulin (CMS/HCC)   • Venous stasis dermatitis of both lower extremities   • Overactive bladder   • GERD with esophagitis   • Cellulitis   • Morbid obesity with BMI of 60.0-69.9, adult (CMS/HCC)   • Declining functional status   • Hypokalemia   • Morbid obesity (CMS/HCC)   • Colon cancer screening   • Diarrhea   • Colitis   • Nodule of colon   •  Elevated liver function tests     Past Medical History:   Diagnosis Date   • Anxiety and depression    • Arthritis     BOTHERSOM IN RIGHT SHOULDER   • Bipolar 1 disorder (CMS/HCC)    • Blind     left eye   • Chronic kidney disease     sees dr narvaez    • Colon polyp 2014   • Diabetes mellitus (CMS/HCC)    • Disease of thyroid gland    • Heart murmur    • High blood pressure    • High cholesterol    • History of nuclear stress test     REPORTS APX. 6 YEARS AGO AND THAT ALL WAS WNL'S   • Hypothyroid    • Migraine     REPORTS NONE FOR 10+ YEARS   • MRSA (methicillin resistant Staphylococcus aureus) 05/2017    REPORTS OF LEFT ANKLE AND THAT SHE WAS TREATED   • Nocturia    • Sleep apnea 2011    USES BIPAP HS, TO BRING IN DOS   • Wears glasses      Past Surgical History:   Procedure Laterality Date   • CHOLECYSTECTOMY  2007   • COLONOSCOPY  2014   • COLONOSCOPY     • COLONOSCOPY N/A 2/20/2018    Procedure: COLONOSCOPY with hot  snare polypectomies , biopsies, and submucosal injection katy ink;  Surgeon: Shravan Tolliver MD;  Location: [facility] ENDOSCOPY;  Service:    • COLONOSCOPY N/A 10/1/2018    Procedure: COLONOSCOPY COLD BIOPSY POLYPECTOMY, COLD BIOPSY, COLD AND HOT SNARE POLYPECTOMY, TATTOO INJECTION;  Surgeon: Shravan Tolliver MD;  Location: [facility] ENDOSCOPY;  Service: Gastroenterology   • D&C HYSTEROSCOPY N/A 3/22/2018    Procedure: DILATATION AND CURETTAGE HYSTEROSCOPY with removal large cervical polyp;  Surgeon: Talita Roman MD;  Location: [facility] OR;  Service: Obstetrics/Gynecology   • GASTRIC SLEEVE LAPAROSCOPIC  2014   • HIP ARTHROPLASTY Left 2015   • INTERVENTIONAL RADIOLOGY PROCEDURE N/A 5/26/2017    Procedure: Abdominal Aortagram with Runoff;  Surgeon: Otf Mehta MD;  Location: [facility] CATH INVASIVE LOCATION;  Service:    • JOINT REPLACEMENT Right 2008    HIP REPLACEMENT   • JOINT REPLACEMENT Left 2015    HIP REPLACEMENT   • SKIN BIOPSY Right     SHOULDER   • TOTAL HIP ARTHROPLASTY Right 2008   •  UPPER GASTROINTESTINAL ENDOSCOPY     • WISDOM TOOTH EXTRACTION       Family History   Problem Relation Age of Onset   • Hypertension Father    • Hyperlipidemia Father    • Diabetes Father    • Heart attack Father    • Liver cancer Father    • Diabetes Brother    • Colon cancer Maternal Grandfather    • Breast cancer Mother      Social History   Substance Use Topics   • Smoking status: Never Smoker   • Smokeless tobacco: Never Used   • Alcohol use No       Current Outpatient Prescriptions:   •  ARIPiprazole (ABILIFY) 30 MG tablet, TAKE ONE (1) TABLET BY MOUTH AT BEDTIME, Disp: , Rfl: 0  •  buPROPion SR (WELLBUTRIN SR) 150 MG 12 hr tablet, Take 150 mg by mouth Daily., Disp: , Rfl:   •  carvedilol (COREG) 25 MG tablet, TAKE 1 TABLET BY MOUTH TWICE A DAY, Disp: , Rfl: 1  •  cholestyramine light (PREVALITE) 4 GM/DOSE powder, Take 1 packet by mouth Every Night., Disp: 210 g, Rfl: 1  •  diltiaZEM CD (CARDIZEM CD) 240 MG 24 hr capsule, Take 480 mg by mouth Daily., Disp: , Rfl:   •  fluticasone (FLONASE) 50 MCG/ACT nasal spray, 2 sprays into the nostril(s) as directed by provider Daily., Disp: , Rfl:   •  furosemide (LASIX) 20 MG tablet, Take 2 tablets by mouth Daily. (Patient taking differently: Take 40 mg by mouth Daily As Needed (MAY TAKE FOR SWELLING AS NEEDED AND CONTACT MD IF NEEDS TO TAKE).), Disp: 60 tablet, Rfl: 3  •  glipiZIDE (GLUCOTROL) 10 MG tablet, Take 10 mg by mouth 2 (Two) Times a Day Before Meals., Disp: , Rfl:   •  hydrOXYzine (ATARAX) 25 MG tablet, Take 50 mg by mouth At Night As Needed for Itching., Disp: , Rfl:   •  levothyroxine (SYNTHROID, LEVOTHROID) 150 MCG tablet, Take 150 mcg by mouth Daily., Disp: , Rfl:   •  lithium (ESKALITH) 450 MG CR tablet, Take 450 mg by mouth Every Night., Disp: , Rfl:   •  Multiple Vitamin (MULTI-DAY PO), Take 1 tablet by mouth Daily., Disp: , Rfl:   •  MYRBETRIQ 50 MG tablet sustained-release 24 hour 24 hr tablet, Take 50 mg by mouth Daily., Disp: , Rfl: 3  •  potassium  "chloride (K-DUR,KLOR-CON) 10 MEQ CR tablet, Take 1 tablet by mouth Daily. (Patient taking differently: Take 10 mEq by mouth Daily As Needed (TAKES THIS SUPPLEMENT ONLY WHEN TAKING LASIX).), Disp: , Rfl:   •  simvastatin (ZOCOR) 40 MG tablet, Take 20 mg by mouth Every Night., Disp: , Rfl:   •  vitamin D (ERGOCALCIFEROL) 91681 units capsule capsule, Take 1 capsule by mouth 1 (One) Time Per Week., Disp: 12 capsule, Rfl: 0  •  GNP MAGNESIUM CITRATE 1.745 GM/30ML solution solution, USE PER MD INSTRUCTION, Disp: , Rfl: 0  •  metoclopramide (REGLAN) 10 MG tablet, USE PER MD INSTRUCTION, Disp: , Rfl: 0  •  ONE TOUCH ULTRA TEST test strip, USE ONE STRIP TWO TIMES A DAY, Disp: , Rfl: 3  •  STIMULANT LAXATIVE 5 MG EC tablet, USE PER MD INSTRUCTION, Disp: , Rfl: 0    Allergies   Allergen Reactions   • Allegra [Fexofenadine] Other (See Comments)     MIGRAINES   • Fluoxetine Hcl Other (See Comments)     MIGRAINES   • Nortriptyline Other (See Comments)     MIGRAINES   • Prozac [Fluoxetine] Other (See Comments)     MIGRAINES     • Trazodone Other (See Comments)     MIGRAINES   • Trazodone And Nefazodone Other (See Comments)     MIGRAINES     • Adhesive Tape Other (See Comments)     \"TEARS MY SKIN\"   • Lamotrigine Rash       Temp 97.8 °F (36.6 °C)   Resp 18   Ht 170.2 cm (67\")   Wt (!) 178 kg (393 lb)   LMP  (LMP Unknown)   BMI 61.55 kg/m²     Physical ExamStigmata of chronic liver disease:  None.  Asterixis:  None.    Laboratory Results:  Upon review of records:      Abdominal Imaging:  Upon review of records:      Procedures:  Upon review of records:      Notes:  1.     Assessment:    No diagnosis found.    Discussion:  1.     Plan/There are no Patient Instructions on file for this visit.   Alexa Kern MA    "

## 2018-10-15 NOTE — PATIENT INSTRUCTIONS
1. Low-fat-low lactose-consistent carbohydrate diet.  2. Weight reduction.  3. Questran light. One scoop orally at night.  4. Noninvasive liver workup.  5. Follow-up in 6 weeks.  6. Follow-up colonoscopy in 2 years for reevaluation. History of colonic nodule.

## 2018-10-15 NOTE — PROGRESS NOTES
Chief Complaint   Patient presents with   • Diarrhea     History of Present Illness     As you recall the patient has history of diarrhea started about 4-5 years.  Had been episodic occurring about 2-3 times a week. Diarrhea was rather severe to begin with, frequency of bowel movements were 7-10 times a day.  The stools were described as loose and watery.  There was nocturnal element of diarrhea.  The diarrhea was associated with tenesmus and urgency.  The patient feels better. Currently, the patient has been having one to 2 formed stools a day. She denies further watery diarrhea.  The patient denies abdominal pain. There is no nausea or vomiting. The patient has occasional reflux. Otherwise she denies significant reflux symptoms.  There is no dysphagia or odynophagia. There is no history of overt GI bleed (Hematemesis, melena or hematochezia).    The patient has mildly elevated liver function tests described as AST and ALT. There is no history of darkening of urine color, lightning of stool color or pruritus. There is no history of jaundice. Her maternal grandfather had colon cancer. There is history of colitis in the past (focal areas of erythema and granularity biopsies of which revealed chronic active colitis with crypt abscesses.) Of note the patient takes hydroxyzine for sleeplessness. The patient denies history of significant pruritus.     Review of Systems   Constitutional: Positive for fatigue. Negative for appetite change, chills, fever and unexpected weight change.   HENT: Negative for mouth sores, nosebleeds and trouble swallowing.    Eyes: Negative for discharge and redness.   Respiratory: Positive for apnea. Negative for cough and shortness of breath.    Cardiovascular: Negative for chest pain, palpitations and leg swelling.   Gastrointestinal: Negative for abdominal distention, abdominal pain, anal bleeding, blood in stool, constipation, diarrhea, nausea and vomiting.   Endocrine: Negative for cold  intolerance, heat intolerance and polydipsia.   Genitourinary: Negative for dysuria, hematuria and urgency.   Musculoskeletal: Positive for arthralgias. Negative for joint swelling and myalgias.   Skin: Negative for rash.   Allergic/Immunologic: Negative for food allergies and immunocompromised state.   Neurological: Positive for headaches. Negative for dizziness, seizures and syncope.   Hematological: Negative for adenopathy. Does not bruise/bleed easily.   Psychiatric/Behavioral: Negative for dysphoric mood. The patient is nervous/anxious. The patient is not hyperactive.      Patient Active Problem List   Diagnosis   • Rash   • Back pain   • Mood changes   • Bipolar disorder (CMS/HCC)   • Acute renal failure (CMS/HCC)   • Cellulitis of left lower extremity   • Chronic venous insufficiency   • Essential hypertension   • Acquired hypothyroidism   • Dyslipidemia   • Type 2 diabetes mellitus without complication, without long-term current use of insulin (CMS/HCC)   • Venous stasis dermatitis of both lower extremities   • Overactive bladder   • GERD with esophagitis   • Cellulitis   • Morbid obesity with BMI of 60.0-69.9, adult (CMS/[facility])   • Declining functional status   • Hypokalemia   • Morbid obesity (CMS/HCC)   • Colon cancer screening   • Diarrhea   • Colitis   • Nodule of colon   • Elevated liver function tests     Past Medical History:   Diagnosis Date   • Anxiety and depression    • Arthritis     BOTHERSOM IN RIGHT SHOULDER   • Bipolar 1 disorder (CMS/HCC)    • Blind     left eye   • Chronic kidney disease     sees dr narvaez    • Colon polyp 2014   • Diabetes mellitus (CMS/HCC)    • Disease of thyroid gland    • Heart murmur    • High blood pressure    • High cholesterol    • History of nuclear stress test     REPORTS APX. 6 YEARS AGO AND THAT ALL WAS WNL'S   • Hypothyroid    • Migraine     REPORTS NONE FOR 10+ YEARS   • MRSA (methicillin resistant Staphylococcus aureus) 05/2017    REPORTS OF LEFT ANKLE AND  THAT SHE WAS TREATED   • Nocturia    • Sleep apnea 2011    USES BIPAP HS, TO BRING IN DOS   • Wears glasses      Past Surgical History:   Procedure Laterality Date   • CHOLECYSTECTOMY  2007   • COLONOSCOPY  2014   • COLONOSCOPY     • COLONOSCOPY N/A 2/20/2018    Procedure: COLONOSCOPY with hot  snare polypectomies , biopsies, and submucosal injection katy ink;  Surgeon: Shravan Tolliver MD;  Location: [facility] ENDOSCOPY;  Service:    • COLONOSCOPY N/A 10/1/2018    Procedure: COLONOSCOPY COLD BIOPSY POLYPECTOMY, COLD BIOPSY, COLD AND HOT SNARE POLYPECTOMY, TATTOO INJECTION;  Surgeon: Shravan Tolliver MD;  Location: [facility] ENDOSCOPY;  Service: Gastroenterology   • D&C HYSTEROSCOPY N/A 3/22/2018    Procedure: DILATATION AND CURETTAGE HYSTEROSCOPY with removal large cervical polyp;  Surgeon: Talita Roman MD;  Location: [facility] OR;  Service: Obstetrics/Gynecology   • GASTRIC SLEEVE LAPAROSCOPIC  2014   • HIP ARTHROPLASTY Left 2015   • INTERVENTIONAL RADIOLOGY PROCEDURE N/A 5/26/2017    Procedure: Abdominal Aortagram with Runoff;  Surgeon: Otf Mehta MD;  Location: [facility] CATH INVASIVE LOCATION;  Service:    • JOINT REPLACEMENT Right 2008    HIP REPLACEMENT   • JOINT REPLACEMENT Left 2015    HIP REPLACEMENT   • SKIN BIOPSY Right     SHOULDER   • TOTAL HIP ARTHROPLASTY Right 2008   • UPPER GASTROINTESTINAL ENDOSCOPY     • WISDOM TOOTH EXTRACTION       Family History   Problem Relation Age of Onset   • Hypertension Father    • Hyperlipidemia Father    • Diabetes Father    • Heart attack Father    • Liver cancer Father    • Diabetes Brother    • Colon cancer Maternal Grandfather    • Breast cancer Mother      Social History   Substance Use Topics   • Smoking status: Never Smoker   • Smokeless tobacco: Never Used   • Alcohol use No       Current Outpatient Prescriptions:   •  ARIPiprazole (ABILIFY) 30 MG tablet, TAKE ONE (1) TABLET BY MOUTH AT BEDTIME, Disp: , Rfl: 0  •  buPROPion SR (WELLBUTRIN SR) 150 MG 12 hr  tablet, Take 150 mg by mouth Daily., Disp: , Rfl:   •  carvedilol (COREG) 25 MG tablet, TAKE 1 TABLET BY MOUTH TWICE A DAY, Disp: , Rfl: 1  •  cholestyramine light (PREVALITE) 4 GM/DOSE powder, Take 1 packet by mouth Every Night., Disp: 210 g, Rfl: 1  •  diltiaZEM CD (CARDIZEM CD) 240 MG 24 hr capsule, Take 480 mg by mouth Daily., Disp: , Rfl:   •  fluticasone (FLONASE) 50 MCG/ACT nasal spray, 2 sprays into the nostril(s) as directed by provider Daily., Disp: , Rfl:   •  furosemide (LASIX) 20 MG tablet, Take 2 tablets by mouth Daily. (Patient taking differently: Take 40 mg by mouth Daily As Needed (MAY TAKE FOR SWELLING AS NEEDED AND CONTACT MD IF NEEDS TO TAKE).), Disp: 60 tablet, Rfl: 3  •  glipiZIDE (GLUCOTROL) 10 MG tablet, Take 10 mg by mouth 2 (Two) Times a Day Before Meals., Disp: , Rfl:   •  hydrOXYzine (ATARAX) 25 MG tablet, Take 50 mg by mouth At Night As Needed for Itching., Disp: , Rfl:   •  levothyroxine (SYNTHROID, LEVOTHROID) 150 MCG tablet, Take 150 mcg by mouth Daily., Disp: , Rfl:   •  lithium (ESKALITH) 450 MG CR tablet, Take 450 mg by mouth Every Night., Disp: , Rfl:   •  Multiple Vitamin (MULTI-DAY PO), Take 1 tablet by mouth Daily., Disp: , Rfl:   •  MYRBETRIQ 50 MG tablet sustained-release 24 hour 24 hr tablet, Take 50 mg by mouth Daily., Disp: , Rfl: 3  •  potassium chloride (K-DUR,KLOR-CON) 10 MEQ CR tablet, Take 1 tablet by mouth Daily. (Patient taking differently: Take 10 mEq by mouth Daily As Needed (TAKES THIS SUPPLEMENT ONLY WHEN TAKING LASIX).), Disp: , Rfl:   •  simvastatin (ZOCOR) 40 MG tablet, Take 20 mg by mouth Every Night., Disp: , Rfl:   •  vitamin D (ERGOCALCIFEROL) 52738 units capsule capsule, Take 1 capsule by mouth 1 (One) Time Per Week., Disp: 12 capsule, Rfl: 0  •  GNP MAGNESIUM CITRATE 1.745 GM/30ML solution solution, USE PER MD INSTRUCTION, Disp: , Rfl: 0  •  metoclopramide (REGLAN) 10 MG tablet, USE PER MD INSTRUCTION, Disp: , Rfl: 0  •  ONE TOUCH ULTRA TEST test strip,  "USE ONE STRIP TWO TIMES A DAY, Disp: , Rfl: 3  •  STIMULANT LAXATIVE 5 MG EC tablet, USE PER MD INSTRUCTION, Disp: , Rfl: 0    Allergies   Allergen Reactions   • Allegra [Fexofenadine] Other (See Comments)     MIGRAINES   • Fluoxetine Hcl Other (See Comments)     MIGRAINES   • Nortriptyline Other (See Comments)     MIGRAINES   • Prozac [Fluoxetine] Other (See Comments)     MIGRAINES     • Trazodone Other (See Comments)     MIGRAINES   • Trazodone And Nefazodone Other (See Comments)     MIGRAINES     • Adhesive Tape Other (See Comments)     \"TEARS MY SKIN\"   • Lamotrigine Rash       Temperature 97.8 °F (36.6 °C), resp. rate 18, height 170.2 cm (67\"), weight (!) 178 kg (393 lb), not currently breastfeeding.    Physical Exam   Constitutional: She is oriented to person, place, and time. She appears well-developed and well-nourished. No distress.   HENT:   Head: Normocephalic and atraumatic.   Right Ear: Hearing and external ear normal.   Left Ear: Hearing and external ear normal.   Nose: Nose normal.   Mouth/Throat: Oropharynx is clear and moist and mucous membranes are normal. Mucous membranes are not pale, not dry and not cyanotic. No oral lesions. No oropharyngeal exudate.   Eyes: Conjunctivae and EOM are normal. Right eye exhibits no discharge. Left eye exhibits no discharge. No scleral icterus.   Neck: Trachea normal. Neck supple. No JVD present. No edema present. No thyroid mass and no thyromegaly present.   Cardiovascular: Normal rate, regular rhythm, S2 normal and normal heart sounds.  Exam reveals no gallop, no S3 and no friction rub.    No murmur heard.  Pulmonary/Chest: Effort normal and breath sounds normal. No respiratory distress. She has no wheezes. She has no rales. She exhibits no tenderness.   Abdominal: Soft. Normal appearance and bowel sounds are normal. She exhibits no distension, no ascites and no mass. There is no splenomegaly or hepatomegaly. There is no tenderness. There is no rigidity, no " rebound and no guarding. No hernia.   Musculoskeletal: She exhibits no tenderness or deformity.     Vascular Status -  Her right foot exhibits no edema. Her left foot exhibits no edema.  Lymphadenopathy:     She has no cervical adenopathy.        Left: No supraclavicular adenopathy present.   Neurological: She is alert and oriented to person, place, and time. She has normal strength. No cranial nerve deficit or sensory deficit. She exhibits normal muscle tone. Coordination normal.   Skin: No rash noted. She is not diaphoretic. No cyanosis. No pallor. Nails show no clubbing.   Psychiatric: She has a normal mood and affect. Her behavior is normal. Judgment and thought content normal.   Nursing note and vitals reviewed.  Stigmata of chronic liver disease:  None.  Asterixis:  None.    Laboratory Testing:  Upon review of medical records:    Dated February 5, 2018 sodium 147 potassium 4.0 chloride 110 CO2 24 BUN 19 serum creatinine 1.30 glucose 123.  Calcium 9.5.  Total protein 7.4.  Albumin 4.40.  T bili 0.5 AST 33 ALT 69 alkaline phosphatase 99.  GGT 20.      Dated 7/18/2018 glucose 97 BUN 17 creatinine 1.0 sodium 142 potassium 5.0 chloride 107 CO2 28 calcium 9.7 albumin 4.3  AST 80 alkaline phosphatase 114 total bilirubin 0.4 WBC 9.63 hemoglobin 13.3 hematocrit 41.0 platelet count 278 MCV 88.7 PTH 65 lithium 0.6 TSH 1.830 hemoglobin A1c 6.3 vitamin B12 733 vitamin D 25.4 phosphorus 4.1    Dated September 17, 2018 sodium 140 potassium 5.4 chloride 104 CO2 27 BUN 22 serum creatinine 1.47 and glucose 102.  Calcium 9.3.  Total protein 6.6.  Albumin 4.45.  T bili 0.4 AST 94  alkaline phosphatase 106.  Vitamin A level 69.4.  Vitamin B-1, whole blood 190.7.  Folate 17.48.  Prealbumin 30.  Serum iron 100.  Ferritin 48.00. .  WBC 9.32 hemoglobin 12.8 hematocrit 42.0 MCV 93.3 and platelet count 313.     Procedures:    Upon review of records:     Dated February 20, 2018 the patient underwent a colonoscopy to  the terminal ileum which revealed: Diverticulosis involving the left colon and transverse colon.  Focal areas of colitis-erythema and granularity in the cecum and ascending colon.  Scant vascular ectasia in the right colon.  Colon polyps.  5 mm submucosal nodule in the descending colon at 40 cm from anal verge which may represent carcinoid nodule. Multiple biopsies were obtained to include submucosal tissue.  Internal hemorrhoids.  Cecum, biopsies revealed chronic active colitis with crypt abscesses compatible with idiopathic inflammatory bowel disease.  Ascending colon, biopsies revealed chronic active colitis compatible with idiopathic inflammatory bowel disease. Transverse colon, polyps, biopsies revealed tubular adenomas.  Chronic active colitis with crypt abscesses compatible with idiopathic inflammatory bowel disease.  Proximal descending colon, submucosal nodule at 40 cm, biopsies revealed serrated polyp compatible with sessile serrated adenoma.  Negative for submucosa.  Distal rectum, polypoid area, biopsies revealed hyperplastic polyp.  Rectal polyp, biopsies revealed hyperplastic polyp.  Transverse colon, polyp #2, biopsies revealed tubular adenoma.    Dated October 1, 2018 the patient underwent a colonoscopy to the terminal ileum which revealed: Left-sided diverticulosis.  Focal erythema and splenic flexure.  Colon polyps. 3-6 mm in size.  3 were removed.  Internal hemorrhoids.  No convincing evidence of active colitis or ileitis.  Random biopsies were also obtained from the colon upon withdrawal of the scope.  Small bowel, biopsy revealed unremarkable ileal mucosa.  Cecum polyp, biopsy revealed colonic mucosa with architectural distortion (the prescription butting and branching).  Random colon biopsies revealed unremarkable colonic mucosa with benign lymphoid aggregates.  Rectal polyp, biopsy revealed tubular adenoma.  Descending colon polyp, biopsy revealed focal active colitis with crypt abscesses.   Descending colon, polypoid, biopsy revealed fragments of hyperplastic polyp.  Rectum, distal polypoid area, biopsy revealed hyperplastic polyp.  Rectum, polypoid area, biopsy revealed fragments of hyperplastic polyp.  Splenic flexure, colon, biopsy revealed chronic active colitis with crypt abscesses.  The colon demonstrates architectural distortion in the cecum and shows active colitis with crypt abscesses in the splenic flexure and descending colon.  There are no granulomas, basal plasmacytosis, or dysplasia identified.  These findings are similar, though milder, then the findings from biopsies and February 2018, and support an idiopathic inflammatory bowel disease in the correct clinical context.     Assessment:      ICD-10-CM ICD-9-CM   1. Diarrhea, unspecified type R19.7 787.91   2. Abnormal liver function tests R94.5 790.6   3. Adenomatous polyp of colon, unspecified part of colon D12.6 211.3   4. Colitis K52.9 558.9   5. Diverticulosis of colon without hemorrhage K57.30 562.10   6. Over weight E66.3 278.02   7. Abnormal levels of other serum enzymes  R74.8 790.5   8. Encounter for screening for other viral diseases  Z11.59 V73.89         Discussion:  1.     Plan/  Patient Instructions   Low-fat-low lactose-consistent carbohydrate diet.  Weight reduction.  Questran light. One scoop orally at night.  Noninvasive liver workup.  Follow-up in 6 weeks.  Follow-up colonoscopy in 2 years for reevaluation. History of colonic nodule.         Shravan Tolliver MD

## 2018-11-14 ENCOUNTER — LAB (OUTPATIENT)
Dept: LAB | Facility: HOSPITAL | Age: 58
End: 2018-11-14
Attending: INTERNAL MEDICINE

## 2018-11-14 DIAGNOSIS — R74.8 ABNORMAL LEVELS OF OTHER SERUM ENZYMES: ICD-10-CM

## 2018-11-14 DIAGNOSIS — R79.89 ABNORMAL LIVER FUNCTION TESTS: ICD-10-CM

## 2018-11-14 DIAGNOSIS — Z11.59 ENCOUNTER FOR SCREENING FOR OTHER VIRAL DISEASES: ICD-10-CM

## 2018-11-14 LAB
FERRITIN SERPL-MCNC: 36.5 NG/ML (ref 11.1–264)
IRON 24H UR-MRATE: 62 MCG/DL (ref 37–181)
IRON SATN MFR SERPL: 15 % (ref 11–46)
TIBC SERPL-MCNC: 418 MCG/DL (ref 261–497)

## 2018-11-14 PROCEDURE — 82103 ALPHA-1-ANTITRYPSIN TOTAL: CPT

## 2018-11-14 PROCEDURE — 86704 HEP B CORE ANTIBODY TOTAL: CPT

## 2018-11-14 PROCEDURE — 82104 ALPHA-1-ANTITRYPSIN PHENO: CPT

## 2018-11-14 PROCEDURE — 86803 HEPATITIS C AB TEST: CPT

## 2018-11-14 PROCEDURE — 86705 HEP B CORE ANTIBODY IGM: CPT

## 2018-11-14 PROCEDURE — 86038 ANTINUCLEAR ANTIBODIES: CPT

## 2018-11-14 PROCEDURE — 83516 IMMUNOASSAY NONANTIBODY: CPT

## 2018-11-14 PROCEDURE — 83550 IRON BINDING TEST: CPT

## 2018-11-14 PROCEDURE — 87340 HEPATITIS B SURFACE AG IA: CPT

## 2018-11-14 PROCEDURE — 82728 ASSAY OF FERRITIN: CPT

## 2018-11-14 PROCEDURE — 86706 HEP B SURFACE ANTIBODY: CPT

## 2018-11-14 PROCEDURE — 36415 COLL VENOUS BLD VENIPUNCTURE: CPT

## 2018-11-14 PROCEDURE — 86708 HEPATITIS A ANTIBODY: CPT

## 2018-11-14 PROCEDURE — 86709 HEPATITIS A IGM ANTIBODY: CPT

## 2018-11-14 PROCEDURE — 83540 ASSAY OF IRON: CPT

## 2018-11-15 LAB
A1AT SERPL-MCNC: 110 MG/DL (ref 90–200)
ACTIN IGG SERPL-ACNC: 8 UNITS (ref 0–19)
ANA SER QL: NEGATIVE
DEPRECATED MITOCHONDRIA M2 IGG SER-ACNC: <20 UNITS (ref 0–20)
HAV AB SER QL IA: NEGATIVE
HAV IGM SERPL QL IA: NEGATIVE
HBV CORE AB SER DONR QL IA: NEGATIVE
HBV CORE IGM SERPL QL IA: NEGATIVE
HBV SURFACE AB SER QL: NON REACTIVE
HBV SURFACE AG SERPL QL IA: NEGATIVE
HCV AB S/CO SERPL IA: 0.2 S/CO RATIO (ref 0–0.9)

## 2018-11-19 LAB
A1AT SERPL-MCNC: 111 MG/DL (ref 90–200)
PHENOTYPE: NORMAL

## 2018-11-26 ENCOUNTER — OFFICE VISIT (OUTPATIENT)
Dept: GASTROENTEROLOGY | Facility: CLINIC | Age: 58
End: 2018-11-26

## 2018-11-26 VITALS
HEIGHT: 67 IN | BODY MASS INDEX: 45.99 KG/M2 | HEART RATE: 75 BPM | RESPIRATION RATE: 16 BRPM | DIASTOLIC BLOOD PRESSURE: 92 MMHG | WEIGHT: 293 LBS | SYSTOLIC BLOOD PRESSURE: 146 MMHG | TEMPERATURE: 97.6 F

## 2018-11-26 DIAGNOSIS — R19.7 DIARRHEA, UNSPECIFIED TYPE: Primary | ICD-10-CM

## 2018-11-26 DIAGNOSIS — D12.6 ADENOMATOUS POLYP OF COLON, UNSPECIFIED PART OF COLON: ICD-10-CM

## 2018-11-26 DIAGNOSIS — R45.86 MOOD CHANGES: ICD-10-CM

## 2018-11-26 DIAGNOSIS — K52.9 COLITIS: ICD-10-CM

## 2018-11-26 DIAGNOSIS — E66.3 OVER WEIGHT: ICD-10-CM

## 2018-11-26 PROCEDURE — 99214 OFFICE O/P EST MOD 30 MIN: CPT | Performed by: INTERNAL MEDICINE

## 2018-11-26 RX ORDER — SOLIFENACIN SUCCINATE 10 MG/1
10 TABLET, FILM COATED ORAL AS NEEDED
COMMUNITY
End: 2019-01-01 | Stop reason: SDUPTHER

## 2019-01-01 ENCOUNTER — OFFICE VISIT (OUTPATIENT)
Dept: UROLOGY | Facility: CLINIC | Age: 59
End: 2019-01-01

## 2019-01-01 ENCOUNTER — TELEPHONE (OUTPATIENT)
Dept: GASTROENTEROLOGY | Facility: CLINIC | Age: 59
End: 2019-01-01

## 2019-01-01 ENCOUNTER — TRANSCRIBE ORDERS (OUTPATIENT)
Dept: LAB | Facility: HOSPITAL | Age: 59
End: 2019-01-01

## 2019-01-01 ENCOUNTER — LAB (OUTPATIENT)
Dept: LAB | Facility: HOSPITAL | Age: 59
End: 2019-01-01

## 2019-01-01 ENCOUNTER — OFFICE VISIT (OUTPATIENT)
Dept: BARIATRICS/WEIGHT MGMT | Facility: CLINIC | Age: 59
End: 2019-01-01

## 2019-01-01 ENCOUNTER — OFFICE VISIT (OUTPATIENT)
Dept: GASTROENTEROLOGY | Facility: CLINIC | Age: 59
End: 2019-01-01

## 2019-01-01 VITALS
HEART RATE: 69 BPM | HEIGHT: 67 IN | RESPIRATION RATE: 20 BRPM | WEIGHT: 293 LBS | TEMPERATURE: 96.9 F | OXYGEN SATURATION: 98 % | DIASTOLIC BLOOD PRESSURE: 87 MMHG | BODY MASS INDEX: 45.99 KG/M2 | SYSTOLIC BLOOD PRESSURE: 145 MMHG

## 2019-01-01 VITALS
SYSTOLIC BLOOD PRESSURE: 120 MMHG | BODY MASS INDEX: 45.99 KG/M2 | OXYGEN SATURATION: 99 % | RESPIRATION RATE: 18 BRPM | WEIGHT: 293 LBS | HEIGHT: 67 IN | DIASTOLIC BLOOD PRESSURE: 80 MMHG | TEMPERATURE: 95.7 F | HEART RATE: 53 BPM

## 2019-01-01 VITALS
BODY MASS INDEX: 45.99 KG/M2 | DIASTOLIC BLOOD PRESSURE: 68 MMHG | TEMPERATURE: 98.2 F | HEART RATE: 59 BPM | SYSTOLIC BLOOD PRESSURE: 107 MMHG | HEIGHT: 67 IN | RESPIRATION RATE: 20 BRPM | WEIGHT: 293 LBS

## 2019-01-01 VITALS
TEMPERATURE: 98.2 F | HEIGHT: 67 IN | WEIGHT: 293 LBS | BODY MASS INDEX: 45.99 KG/M2 | HEART RATE: 74 BPM | RESPIRATION RATE: 18 BRPM | OXYGEN SATURATION: 95 %

## 2019-01-01 DIAGNOSIS — Z79.899 ENCOUNTER FOR LONG-TERM (CURRENT) USE OF OTHER MEDICATIONS: Primary | ICD-10-CM

## 2019-01-01 DIAGNOSIS — E55.9 VITAMIN D DEFICIENCY: ICD-10-CM

## 2019-01-01 DIAGNOSIS — R45.86 MOOD CHANGES: ICD-10-CM

## 2019-01-01 DIAGNOSIS — E11.69 DIABETES MELLITUS TYPE 2 IN OBESE (HCC): ICD-10-CM

## 2019-01-01 DIAGNOSIS — E66.01 MORBID OBESITY WITH BMI OF 60.0-69.9, ADULT (HCC): ICD-10-CM

## 2019-01-01 DIAGNOSIS — N18.31 CHRONIC KIDNEY DISEASE (CKD) STAGE G3A/A1, MODERATELY DECREASED GLOMERULAR FILTRATION RATE (GFR) BETWEEN 45-59 ML/MIN/1.73 SQUARE METER AND ALBUMINURIA CREATININE RATIO LESS THAN 30 MG/G (CMS/H* (HCC): Primary | ICD-10-CM

## 2019-01-01 DIAGNOSIS — I10 ESSENTIAL HYPERTENSION: ICD-10-CM

## 2019-01-01 DIAGNOSIS — R79.89 ABNORMAL LFTS (LIVER FUNCTION TESTS): ICD-10-CM

## 2019-01-01 DIAGNOSIS — N32.81 OVERACTIVE BLADDER: ICD-10-CM

## 2019-01-01 DIAGNOSIS — G47.33 OBSTRUCTIVE SLEEP APNEA SYNDROME: ICD-10-CM

## 2019-01-01 DIAGNOSIS — N76.2 ATROPHIC VULVITIS: ICD-10-CM

## 2019-01-01 DIAGNOSIS — E66.3 OVER WEIGHT: ICD-10-CM

## 2019-01-01 DIAGNOSIS — R53.83 FATIGUE, UNSPECIFIED TYPE: ICD-10-CM

## 2019-01-01 DIAGNOSIS — R19.7 DIARRHEA, UNSPECIFIED TYPE: Primary | ICD-10-CM

## 2019-01-01 DIAGNOSIS — E66.9 DIABETES MELLITUS TYPE 2 IN OBESE (HCC): ICD-10-CM

## 2019-01-01 DIAGNOSIS — K76.9 LIVER DISEASE: ICD-10-CM

## 2019-01-01 DIAGNOSIS — Z98.84 S/P BARIATRIC SURGERY: ICD-10-CM

## 2019-01-01 DIAGNOSIS — N76.2 ATROPHIC VULVITIS: Primary | ICD-10-CM

## 2019-01-01 DIAGNOSIS — R10.13 DYSPEPSIA: Primary | ICD-10-CM

## 2019-01-01 DIAGNOSIS — Z79.899 ENCOUNTER FOR LONG-TERM (CURRENT) USE OF OTHER MEDICATIONS: ICD-10-CM

## 2019-01-01 DIAGNOSIS — R53.83 FATIGUE, UNSPECIFIED TYPE: Primary | ICD-10-CM

## 2019-01-01 DIAGNOSIS — E53.8 VITAMIN B12 DEFICIENCY: ICD-10-CM

## 2019-01-01 DIAGNOSIS — N18.31 CHRONIC KIDNEY DISEASE (CKD) STAGE G3A/A1, MODERATELY DECREASED GLOMERULAR FILTRATION RATE (GFR) BETWEEN 45-59 ML/MIN/1.73 SQUARE METER AND ALBUMINURIA CREATININE RATIO LESS THAN 30 MG/G (CMS/H* (HCC): ICD-10-CM

## 2019-01-01 DIAGNOSIS — N39.0 RECURRENT UTI: Primary | ICD-10-CM

## 2019-01-01 LAB
25(OH)D3+25(OH)D2 SERPL-MCNC: 27.2 NG/ML (ref 30–100)
ALBUMIN SERPL-MCNC: 4.2 G/DL (ref 3.5–5)
ALBUMIN SERPL-MCNC: 4.4 G/DL (ref 3.5–5.2)
ALBUMIN SERPL-MCNC: 4.6 G/DL (ref 3.5–5.2)
ALBUMIN/GLOB SERPL: 1.4 G/DL
ALBUMIN/GLOB SERPL: 2.4 G/DL
ALP SERPL-CCNC: 98 U/L (ref 39–117)
ALP SERPL-CCNC: 99 U/L (ref 39–117)
ALT SERPL W P-5'-P-CCNC: 63 U/L (ref 1–33)
ALT SERPL-CCNC: 41 U/L (ref 1–33)
ANION GAP SERPL CALCULATED.3IONS-SCNC: 12.4 MMOL/L (ref 10–20)
ANION GAP SERPL CALCULATED.3IONS-SCNC: 16.4 MMOL/L (ref 5–15)
AST SERPL-CCNC: 22 U/L (ref 1–32)
AST SERPL-CCNC: 39 U/L (ref 1–32)
BASOPHILS # BLD AUTO: 0.07 10*3/MM3 (ref 0–0.2)
BASOPHILS # BLD AUTO: 0.08 10*3/MM3 (ref 0–0.2)
BASOPHILS NFR BLD AUTO: 0.8 % (ref 0–1.5)
BASOPHILS NFR BLD AUTO: 0.8 % (ref 0–1.5)
BILIRUB BLD-MCNC: NEGATIVE MG/DL
BILIRUB SERPL-MCNC: 0.3 MG/DL (ref 0.2–1.2)
BILIRUB SERPL-MCNC: 0.4 MG/DL (ref 0.2–1.2)
BUN BLD-MCNC: 26 MG/DL (ref 6–20)
BUN BLD-MCNC: 27 MG/DL (ref 7–20)
BUN SERPL-MCNC: 30 MG/DL (ref 6–20)
BUN/CREAT SERPL: 17.1 (ref 7–25)
BUN/CREAT SERPL: 19.3 (ref 7.1–23.5)
BUN/CREAT SERPL: 20.7 (ref 7–25)
CALCIUM SERPL-MCNC: 9.3 MG/DL (ref 8.6–10.5)
CALCIUM SPEC-SCNC: 9 MG/DL (ref 8.4–10.2)
CALCIUM SPEC-SCNC: 9.3 MG/DL (ref 8.6–10.5)
CERULOPLASMIN SERPL-MCNC: 29.9 MG/DL (ref 19–39)
CHLORIDE SERPL-SCNC: 102 MMOL/L (ref 98–107)
CHLORIDE SERPL-SCNC: 102 MMOL/L (ref 98–107)
CHLORIDE SERPL-SCNC: 107 MMOL/L (ref 98–107)
CLARITY, POC: CLEAR
CO2 SERPL-SCNC: 22.6 MMOL/L (ref 22–29)
CO2 SERPL-SCNC: 25.8 MMOL/L (ref 22–29)
CO2 SERPL-SCNC: 26 MMOL/L (ref 26–30)
COLOR UR: YELLOW
CREAT BLD-MCNC: 1.4 MG/DL (ref 0.6–1.3)
CREAT BLD-MCNC: 1.52 MG/DL (ref 0.57–1)
CREAT SERPL-MCNC: 1.45 MG/DL (ref 0.57–1)
CREAT UR-MCNC: 243.6 MG/DL
DEPRECATED RDW RBC AUTO: 44.5 FL (ref 37–54)
EOSINOPHIL # BLD AUTO: 0.36 10*3/MM3 (ref 0–0.4)
EOSINOPHIL # BLD AUTO: 0.45 10*3/MM3 (ref 0–0.4)
EOSINOPHIL NFR BLD AUTO: 3.9 % (ref 0.3–6.2)
EOSINOPHIL NFR BLD AUTO: 4.5 % (ref 0.3–6.2)
ERYTHROCYTE [DISTWIDTH] IN BLOOD BY AUTOMATED COUNT: 13.8 % (ref 12.3–15.4)
ERYTHROCYTE [DISTWIDTH] IN BLOOD BY AUTOMATED COUNT: 14.2 % (ref 12.3–15.4)
FERRITIN SERPL-MCNC: 66.4 NG/ML (ref 13–150)
FOLATE SERPL-MCNC: >20 NG/ML (ref 4.78–24.2)
GFR SERPL CREATININE-BSD FRML MDRD: 35 ML/MIN/1.73
GFR SERPL CREATININE-BSD FRML MDRD: 38 ML/MIN/1.73
GLOBULIN SER CALC-MCNC: 1.9 GM/DL
GLOBULIN UR ELPH-MCNC: 3.2 GM/DL
GLUCOSE BLD-MCNC: 135 MG/DL (ref 74–98)
GLUCOSE BLD-MCNC: 157 MG/DL (ref 65–99)
GLUCOSE SERPL-MCNC: 104 MG/DL (ref 65–99)
GLUCOSE UR STRIP-MCNC: NEGATIVE MG/DL
HCT VFR BLD AUTO: 38.9 % (ref 34–46.6)
HCT VFR BLD AUTO: 42.5 % (ref 34–46.6)
HGB BLD-MCNC: 12.9 G/DL (ref 12–15.9)
HGB BLD-MCNC: 12.9 G/DL (ref 12–15.9)
IMM GRANULOCYTES # BLD AUTO: 0.03 10*3/MM3 (ref 0–0.05)
IMM GRANULOCYTES # BLD AUTO: 0.04 10*3/MM3 (ref 0–0.05)
IMM GRANULOCYTES NFR BLD AUTO: 0.3 % (ref 0–0.5)
IMM GRANULOCYTES NFR BLD AUTO: 0.4 % (ref 0–0.5)
IRON SERPL-MCNC: 88 MCG/DL (ref 37–145)
KETONES UR QL: NEGATIVE
LEUKOCYTE EST, POC: NEGATIVE
LYMPHOCYTES # BLD AUTO: 2.12 10*3/MM3 (ref 0.7–3.1)
LYMPHOCYTES # BLD AUTO: 2.6 10*3/MM3 (ref 0.7–3.1)
LYMPHOCYTES NFR BLD AUTO: 22.8 % (ref 19.6–45.3)
LYMPHOCYTES NFR BLD AUTO: 26.1 % (ref 19.6–45.3)
Lab: NORMAL
MCH RBC QN AUTO: 28.7 PG (ref 26.6–33)
MCH RBC QN AUTO: 29.2 PG (ref 26.6–33)
MCHC RBC AUTO-ENTMCNC: 30.4 G/DL (ref 31.5–35.7)
MCHC RBC AUTO-ENTMCNC: 33.2 G/DL (ref 31.5–35.7)
MCV RBC AUTO: 88 FL (ref 79–97)
MCV RBC AUTO: 94.4 FL (ref 79–97)
METHYLMALONATE SERPL-SCNC: 204 NMOL/L (ref 0–378)
MONOCYTES # BLD AUTO: 0.54 10*3/MM3 (ref 0.1–0.9)
MONOCYTES # BLD AUTO: 0.61 10*3/MM3 (ref 0.1–0.9)
MONOCYTES NFR BLD AUTO: 5.8 % (ref 5–12)
MONOCYTES NFR BLD AUTO: 6.1 % (ref 5–12)
NEUTROPHILS # BLD AUTO: 6.18 10*3/MM3 (ref 1.7–7)
NEUTROPHILS # BLD AUTO: 6.19 10*3/MM3 (ref 1.7–7)
NEUTROPHILS NFR BLD AUTO: 62.1 % (ref 42.7–76)
NEUTROPHILS NFR BLD AUTO: 66.4 % (ref 42.7–76)
NITRITE UR-MCNC: NEGATIVE MG/ML
NRBC BLD AUTO-RTO: 0 /100 WBC (ref 0–0.2)
NRBC BLD AUTO-RTO: 0 /100 WBC (ref 0–0.2)
PH UR: 5.5 [PH] (ref 5–8)
PHOSPHATE SERPL-MCNC: 3.7 MG/DL (ref 2.5–4.5)
PLATELET # BLD AUTO: 284 10*3/MM3 (ref 140–450)
PLATELET # BLD AUTO: 314 10*3/MM3 (ref 140–450)
PMV BLD AUTO: 9.5 FL (ref 6–12)
POTASSIUM BLD-SCNC: 4.4 MMOL/L (ref 3.5–5.1)
POTASSIUM BLD-SCNC: 4.6 MMOL/L (ref 3.5–5.2)
POTASSIUM SERPL-SCNC: 4.7 MMOL/L (ref 3.5–5.2)
PREALB SERPL-MCNC: 27 MG/DL (ref 10–36)
PROT SERPL-MCNC: 6.5 G/DL (ref 6–8.5)
PROT SERPL-MCNC: 7.6 G/DL (ref 6–8.5)
PROT UR STRIP-MCNC: ABNORMAL MG/DL
PROT UR-MCNC: 23 MG/DL (ref 0–11.9)
PROT/CREAT UR: 94.4 MG/G CREA
PTH-INTACT SERPL-MCNC: 58 PG/ML (ref 15–65)
RBC # BLD AUTO: 4.42 10*6/MM3 (ref 3.77–5.28)
RBC # BLD AUTO: 4.5 10*6/MM3 (ref 3.77–5.28)
RBC # UR STRIP: NEGATIVE /UL
SODIUM BLD-SCNC: 141 MMOL/L (ref 136–145)
SODIUM BLD-SCNC: 141 MMOL/L (ref 137–145)
SODIUM SERPL-SCNC: 143 MMOL/L (ref 136–145)
SP GR UR: 1.03 (ref 1–1.03)
URATE SERPL-MCNC: 8.6 MG/DL (ref 2.5–8.5)
UROBILINOGEN UR QL: NORMAL
VIT B1 BLD-SCNC: 189.4 NMOL/L (ref 66.5–200)
VIT B12 SERPL-MCNC: 888 PG/ML (ref 211–946)
WBC # BLD AUTO: 9.31 10*3/MM3 (ref 3.4–10.8)
WBC NRBC COR # BLD: 9.96 10*3/MM3 (ref 3.4–10.8)

## 2019-01-01 PROCEDURE — 84156 ASSAY OF PROTEIN URINE: CPT

## 2019-01-01 PROCEDURE — 36415 COLL VENOUS BLD VENIPUNCTURE: CPT

## 2019-01-01 PROCEDURE — 80069 RENAL FUNCTION PANEL: CPT

## 2019-01-01 PROCEDURE — 99213 OFFICE O/P EST LOW 20 MIN: CPT | Performed by: UROLOGY

## 2019-01-01 PROCEDURE — 83970 ASSAY OF PARATHORMONE: CPT

## 2019-01-01 PROCEDURE — 99214 OFFICE O/P EST MOD 30 MIN: CPT | Performed by: SURGERY

## 2019-01-01 PROCEDURE — 82390 ASSAY OF CERULOPLASMIN: CPT

## 2019-01-01 PROCEDURE — 99214 OFFICE O/P EST MOD 30 MIN: CPT | Performed by: PHYSICIAN ASSISTANT

## 2019-01-01 PROCEDURE — 81003 URINALYSIS AUTO W/O SCOPE: CPT | Performed by: UROLOGY

## 2019-01-01 PROCEDURE — 85025 COMPLETE CBC W/AUTO DIFF WBC: CPT

## 2019-01-01 PROCEDURE — 99214 OFFICE O/P EST MOD 30 MIN: CPT | Performed by: INTERNAL MEDICINE

## 2019-01-01 PROCEDURE — 84550 ASSAY OF BLOOD/URIC ACID: CPT

## 2019-01-01 PROCEDURE — 80053 COMPREHEN METABOLIC PANEL: CPT

## 2019-01-01 PROCEDURE — 82570 ASSAY OF URINE CREATININE: CPT

## 2019-01-01 RX ORDER — ERGOCALCIFEROL 1.25 MG/1
CAPSULE ORAL
Qty: 12 CAPSULE | Refills: 0 | OUTPATIENT
Start: 2019-01-01

## 2019-01-01 RX ORDER — ESTRADIOL 0.1 MG/G
CREAM VAGINAL
Qty: 42.5 G | Refills: 5 | Status: SHIPPED | OUTPATIENT
Start: 2019-01-01

## 2019-01-01 RX ORDER — SULFAMETHOXAZOLE AND TRIMETHOPRIM 800; 160 MG/1; MG/1
1 TABLET ORAL 2 TIMES DAILY
COMMUNITY

## 2019-01-01 RX ORDER — CALCITRIOL 0.25 UG/1
CAPSULE, LIQUID FILLED ORAL
Refills: 5 | COMMUNITY
Start: 2019-01-01

## 2019-01-01 RX ORDER — SOLIFENACIN SUCCINATE 10 MG/1
10 TABLET, FILM COATED ORAL AS NEEDED
Qty: 30 TABLET | Refills: 11 | Status: SHIPPED | OUTPATIENT
Start: 2019-01-01

## 2019-02-04 NOTE — PROGRESS NOTES
[facility] Bariatric Surgery  [address]  [facility] [id number] [phone]        Patient Name: Caty Garcia.  YOB: 1960      Date of Visit: 02/04/2019      Reason for Visit:  Weight check    HPI:  Caty Garcia is a 58 y.o. female s/p LSG w reduction/repair of paraesophageal lipomatous HH / liver bx (mild steatosis) by [facility] on 9/10/14    Back to [facility] for past 2 weeks, does water aerobics for 90 minutes 3 days per week.      Showed me her diet log.  Getting around 3672-0944 calories per day.  Protein not logged, but estimated by my looks like <40 g/day.  BMR results disucssed, WLZ 6254-8302 calories per day.        Presurgery weight: 428 pounds. Today's weight is (!) 177 kg (391 lb) pounds, today's  Body mass index is 61.24 kg/m²., and her weight loss since surgery is 37 pounds.         Past Medical History:   Diagnosis Date   • Anxiety and depression    • Arthritis     BOTHERSOM IN RIGHT SHOULDER   • Bipolar 1 disorder (CMS/HCC)    • Blind     left eye   • Chronic kidney disease     sees dr narvaez    • Colon polyp 2014   • Diabetes mellitus (CMS/HCC)    • Disease of thyroid gland    • Heart murmur    • High blood pressure    • High cholesterol    • History of nuclear stress test     REPORTS APX. 6 YEARS AGO AND THAT ALL WAS WNL'S   • Hypothyroid    • Migraine     REPORTS NONE FOR 10+ YEARS   • MRSA (methicillin resistant Staphylococcus aureus) 05/2017    REPORTS OF LEFT ANKLE AND THAT SHE WAS TREATED   • Nocturia    • Sleep apnea 2011    USES BIPAP HS, TO BRING IN DOS   • Wears glasses      Past Surgical History:   Procedure Laterality Date   • CHOLECYSTECTOMY  2007   • COLONOSCOPY  2014   • COLONOSCOPY     • COLONOSCOPY N/A 2/20/2018    Procedure: COLONOSCOPY with hot  snare polypectomies , biopsies, and submucosal injection katy ink;  Surgeon: Shravan Tolliver MD;  Location: [facility] ENDOSCOPY;  Service:    • COLONOSCOPY N/A 10/1/2018    Procedure:  COLONOSCOPY COLD BIOPSY POLYPECTOMY, COLD BIOPSY, COLD AND HOT SNARE POLYPECTOMY, TATTOO INJECTION;  Surgeon: Shravan Tolliver MD;  Location: [facility] ENDOSCOPY;  Service: Gastroenterology   • D&C HYSTEROSCOPY N/A 3/22/2018    Procedure: DILATATION AND CURETTAGE HYSTEROSCOPY with removal large cervical polyp;  Surgeon: Talita Roman MD;  Location: [facility] OR;  Service: Obstetrics/Gynecology   • GASTRIC SLEEVE LAPAROSCOPIC  2014   • HIP ARTHROPLASTY Left 2015   • INTERVENTIONAL RADIOLOGY PROCEDURE N/A 5/26/2017    Procedure: Abdominal Aortagram with Runoff;  Surgeon: Otf Mehta MD;  Location: [facility] CATH INVASIVE LOCATION;  Service:    • JOINT REPLACEMENT Right 2008    HIP REPLACEMENT   • JOINT REPLACEMENT Left 2015    HIP REPLACEMENT   • SKIN BIOPSY Right     SHOULDER   • TOTAL HIP ARTHROPLASTY Right 2008   • UPPER GASTROINTESTINAL ENDOSCOPY     • WISDOM TOOTH EXTRACTION       Outpatient Medications Marked as Taking for the 2/4/19 encounter (Office Visit) with Rachael Verde MD   Medication Sig Dispense Refill   • ARIPiprazole (ABILIFY) 30 MG tablet TAKE ONE (1) TABLET BY MOUTH AT BEDTIME  0   • buPROPion SR (WELLBUTRIN SR) 150 MG 12 hr tablet Take 150 mg by mouth Daily.     • carvedilol (COREG) 25 MG tablet TAKE 1 TABLET BY MOUTH TWICE A DAY  1   • cholestyramine light (PREVALITE) 4 GM/DOSE powder Take 1 packet by mouth Every Night. 210 g 1   • diclofenac (VOLTAREN) 1 % gel gel Apply  topically to the appropriate area as directed As Needed.     • diltiaZEM CD (CARDIZEM CD) 240 MG 24 hr capsule Take 480 mg by mouth Daily.     • Estradiol (ESTRACE VA) Insert  into the vagina.     • fluticasone (FLONASE) 50 MCG/ACT nasal spray 2 sprays into the nostril(s) as directed by provider Daily.     • furosemide (LASIX) 20 MG tablet Take 2 tablets by mouth Daily. (Patient taking differently: Take 40 mg by mouth Daily As Needed (MAY TAKE FOR SWELLING AS NEEDED AND CONTACT MD IF NEEDS TO TAKE).) 60 tablet 3   •  "glipiZIDE (GLUCOTROL) 10 MG tablet Take 10 mg by mouth 2 (Two) Times a Day Before Meals.     • hydrOXYzine (ATARAX) 25 MG tablet Take 50 mg by mouth At Night As Needed for Itching.     • levothyroxine (SYNTHROID, LEVOTHROID) 150 MCG tablet Take 150 mcg by mouth Daily.     • lithium (ESKALITH) 450 MG CR tablet Take 450 mg by mouth Every Night.     • Multiple Vitamin (MULTI-DAY PO) Take 1 tablet by mouth Daily.     • MYRBETRIQ 50 MG tablet sustained-release 24 hour 24 hr tablet Take 50 mg by mouth Daily.  3   • ONE TOUCH ULTRA TEST test strip USE ONE STRIP TWO TIMES A DAY  3   • potassium chloride (K-DUR,KLOR-CON) 10 MEQ CR tablet Take 1 tablet by mouth Daily. (Patient taking differently: Take 10 mEq by mouth Daily As Needed (TAKES THIS SUPPLEMENT ONLY WHEN TAKING LASIX).)     • simvastatin (ZOCOR) 40 MG tablet Take 20 mg by mouth Every Night.     • solifenacin (VESICARE) 10 MG tablet Take 10 mg by mouth As Needed for bladder spasms.     • vitamin D (ERGOCALCIFEROL) 05366 units capsule capsule Take 1 capsule by mouth 1 (One) Time Per Week. 12 capsule 0     Allergies   Allergen Reactions   • Allegra [Fexofenadine] Other (See Comments)     MIGRAINES   • Fluoxetine Hcl Other (See Comments)     MIGRAINES   • Nortriptyline Other (See Comments)     MIGRAINES   • Prozac [Fluoxetine] Other (See Comments)     MIGRAINES     • Trazodone Other (See Comments)     MIGRAINES   • Trazodone And Nefazodone Other (See Comments)     MIGRAINES     • Adhesive Tape Other (See Comments)     \"TEARS MY SKIN\"   • Lamotrigine Rash       Social History     Socioeconomic History   • Marital status: Single     Spouse name: Not on file   • Number of children: Not on file   • Years of education: Not on file   • Highest education level: Not on file   Social Needs   • Financial resource strain: Not on file   • Food insecurity - worry: Not on file   • Food insecurity - inability: Not on file   • Transportation needs - medical: Not on file   • " Transportation needs - non-medical: Not on file   Occupational History     Employer: UNEMPLOYED   Tobacco Use   • Smoking status: Never Smoker   • Smokeless tobacco: Never Used   Substance and Sexual Activity   • Alcohol use: No   • Drug use: No   • Sexual activity: Defer   Other Topics Concern   • Not on file   Social History Narrative   • Not on file       Vitals:    02/04/19 0923   BP: 145/87   Pulse: 69   Resp: 20   Temp: 96.9 °F (36.1 °C)   SpO2: 98%     Weight (!) 177 kg (391 lb)  Body mass index is 61.24 kg/m².    Physical Exam   Constitutional: She is oriented to person, place, and time. She appears well-developed and well-nourished. No distress.   HENT:   Head: Normocephalic and atraumatic.   Mouth/Throat: No oropharyngeal exudate.   Eyes: Conjunctivae and EOM are normal. Pupils are equal, round, and reactive to light.   Pulmonary/Chest: Effort normal. No respiratory distress.   Abdominal: Soft. She exhibits no distension.   Neurological: She is alert and oriented to person, place, and time. No cranial nerve deficit.   Skin: Skin is warm and dry. She is not diaphoretic. No pallor.   Psychiatric: She has a normal mood and affect. Her behavior is normal. Thought content normal.         Assessment:      ICD-10-CM ICD-9-CM   1. Fatigue, unspecified type R53.83 780.79   2. Diabetes mellitus type 2 in obese (CMS/[facility]) E11.69 250.00    E66.9 278.00   3. Essential hypertension I10 401.9   4. Body mass index (BMI) of 60.0-69.9 in adult (CMS/[facility]) Z68.44 V85.44   5. S/P bariatric surgery Z98.84 V45.86   6. Obstructive sleep apnea syndrome G47.33 327.23       Plan:  Continue w/ good food choices and healthy habits.  Encouraged to focus on high protein, low carb.  Continue routine exercise.  Routine labs ordered.  Continue current vitamin regimen w/ adjustments pending lab results.  Call w/ issues/concerns.  RTC sooner w/ problems.     Plan:  Increase calories to 1600 per day  Increase protein to  g/day.    I think  not losing b/c calories and protein too low despite exercise and fairly good meal choices.        The patient was instructed to follow up in 3 months .

## 2019-06-18 NOTE — TELEPHONE ENCOUNTER
First Attempt: Called patient about overdue labs, patient states she will be getting labs soon.

## 2019-07-05 NOTE — TELEPHONE ENCOUNTER
Called patient about pending labs, she states, she will be getting them completed before end of July.

## 2019-07-15 NOTE — PROGRESS NOTES
"[facility] Bariatric Surgery  [address]  [facility] [address] [phone]        Patient Name: Caty Garcia.  YOB: 1960      Date of Visit: 07/15/2019      Reason for Visit:  Annual Eval - 5 years postop    HPI:  Caty Garcia 59 y.o. female s/p LSG/ paraesophageal HHR/ liver bx (mild steatosis) GDW 9/10/14    LOV 2/2019 - saw Dr. Verde.  Reviewed diet log, getting 4312-1601 michael/day w/ <40g prot/day.    Continues w/ food journal.  Working to increase daily protein intake, getting at least 75g prot/day - eating bermeo, roast breast, chicken breast, nuts (trail mix). Still struggling to get the recommended 1600 michael/day b/c she \"gets full, doesn't feel like eating!\".  Drinking water + SF koolaid.  Exercise has been limited b/c she lost her ride so has been unable to get to the gym.      Last bariatric labs 9/2018 - low Vit D (26.8), .   Taking MVI + Vit D2 3x/week.    Presurgery weight: 428 pounds. Today's weight is (!) 177 kg (390 lb) pounds, today's  Body mass index is 61.08 kg/m²., and her weight loss since surgery is 38 pounds.         Past Medical History:   Diagnosis Date   • Anxiety and depression    • Arthritis     BOTHERSOM IN RIGHT SHOULDER   • Bipolar 1 disorder (CMS/HCC)    • Blind     left eye   • Cellulitis     RIGHT FOOT    • Chronic kidney disease     sees dr narvaez    • Colon polyp 2014   • Diabetes mellitus (CMS/HCC)    • Disease of thyroid gland    • Heart murmur    • High blood pressure    • High cholesterol    • History of nuclear stress test     REPORTS APX. 6 YEARS AGO AND THAT ALL WAS WNL'S   • Hypothyroid    • Migraine     REPORTS NONE FOR 10+ YEARS   • MRSA (methicillin resistant Staphylococcus aureus) 05/2017    REPORTS OF LEFT ANKLE AND THAT SHE WAS TREATED   • Nocturia    • Sleep apnea 2011    USES BIPAP HS, TO BRING IN DOS   • Wears glasses      Past Surgical History:   Procedure Laterality Date   • " CHOLECYSTECTOMY  2007   • COLONOSCOPY  2014   • COLONOSCOPY     • COLONOSCOPY N/A 2/20/2018    Procedure: COLONOSCOPY with hot  snare polypectomies , biopsies, and submucosal injection katy ink;  Surgeon: Shravan Tolliver MD;  Location: [facility] ENDOSCOPY;  Service:    • COLONOSCOPY N/A 10/1/2018    Procedure: COLONOSCOPY COLD BIOPSY POLYPECTOMY, COLD BIOPSY, COLD AND HOT SNARE POLYPECTOMY, TATTOO INJECTION;  Surgeon: Shravan Tolliver MD;  Location: [facility] ENDOSCOPY;  Service: Gastroenterology   • D&C HYSTEROSCOPY N/A 3/22/2018    Procedure: DILATATION AND CURETTAGE HYSTEROSCOPY with removal large cervical polyp;  Surgeon: Talita Roman MD;  Location: [facility] OR;  Service: Obstetrics/Gynecology   • GASTRIC SLEEVE LAPAROSCOPIC  2014   • HIP ARTHROPLASTY Left 2015   • INTERVENTIONAL RADIOLOGY PROCEDURE N/A 5/26/2017    Procedure: Abdominal Aortagram with Runoff;  Surgeon: Otf Mehta MD;  Location: [facility] CATH INVASIVE LOCATION;  Service:    • JOINT REPLACEMENT Right 2008    HIP REPLACEMENT   • JOINT REPLACEMENT Left 2015    HIP REPLACEMENT   • SKIN BIOPSY Right     SHOULDER   • TOTAL HIP ARTHROPLASTY Right 2008   • UPPER GASTROINTESTINAL ENDOSCOPY     • WISDOM TOOTH EXTRACTION       Outpatient Medications Marked as Taking for the 7/15/19 encounter (Office Visit) with Camille Jules PA   Medication Sig Dispense Refill   • ARIPiprazole (ABILIFY) 30 MG tablet TAKE ONE (1) TABLET BY MOUTH AT BEDTIME  0   • buPROPion SR (WELLBUTRIN SR) 150 MG 12 hr tablet Take 150 mg by mouth Daily.     • carvedilol (COREG) 25 MG tablet TAKE 1 TABLET BY MOUTH TWICE A DAY  1   • cholestyramine light (PREVALITE) 4 GM/DOSE powder Take 1 packet by mouth Every Night. 210 g 1   • diltiaZEM CD (CARDIZEM CD) 240 MG 24 hr capsule Take 240 mg by mouth 2 (Two) Times a Day.     • Estradiol (ESTRACE VA) Insert  into the vagina. 3 times a week     • fluticasone (FLONASE) 50 MCG/ACT nasal spray 2 sprays into the nostril(s) as directed by  "provider Daily.     • furosemide (LASIX) 20 MG tablet Take 2 tablets by mouth Daily. (Patient taking differently: Take 40 mg by mouth Daily As Needed (MAY TAKE FOR SWELLING AS NEEDED AND CONTACT MD IF NEEDS TO TAKE).) 60 tablet 3   • glipiZIDE (GLUCOTROL) 10 MG tablet Take 10 mg by mouth 2 (Two) Times a Day Before Meals.     • hydrOXYzine (ATARAX) 25 MG tablet Take 50 mg by mouth At Night As Needed for Itching (4 times a day as needed for sleep).     • levothyroxine (SYNTHROID, LEVOTHROID) 150 MCG tablet Take 150 mcg by mouth Daily.     • lithium (ESKALITH) 450 MG CR tablet Take 450 mg by mouth Every Night.     • Multiple Vitamin (MULTI-DAY PO) Take 1 tablet by mouth Daily.     • MYRBETRIQ 50 MG tablet sustained-release 24 hour 24 hr tablet Take 50 mg by mouth Daily.  3   • ONE TOUCH ULTRA TEST test strip USE ONE STRIP TWO TIMES A DAY  3   • potassium chloride (K-DUR,KLOR-CON) 10 MEQ CR tablet Take 1 tablet by mouth Daily. (Patient taking differently: Take 10 mEq by mouth Daily As Needed (TAKES THIS SUPPLEMENT ONLY WHEN TAKING LASIX).)     • simvastatin (ZOCOR) 40 MG tablet Take 20 mg by mouth Every Night.     • solifenacin (VESICARE) 10 MG tablet Take 10 mg by mouth As Needed for bladder spasms.       Allergies   Allergen Reactions   • Allegra [Fexofenadine] Other (See Comments)     MIGRAINES   • Fluoxetine Hcl Other (See Comments)     MIGRAINES   • Nortriptyline Other (See Comments)     MIGRAINES   • Prozac [Fluoxetine] Other (See Comments)     MIGRAINES     • Trazodone Other (See Comments)     MIGRAINES   • Trazodone And Nefazodone Other (See Comments)     MIGRAINES     • Adhesive Tape Other (See Comments)     \"TEARS MY SKIN\"   • Lamotrigine Rash       Social History     Socioeconomic History   • Marital status: Single     Spouse name: Not on file   • Number of children: Not on file   • Years of education: Not on file   • Highest education level: Not on file   Occupational History     Employer: UNEMPLOYED "   Tobacco Use   • Smoking status: Never Smoker   • Smokeless tobacco: Never Used   Substance and Sexual Activity   • Alcohol use: No   • Drug use: No   • Sexual activity: Defer       Vitals:    07/15/19 0922   BP: 120/80   Pulse: 53   Resp: 18   Temp: 95.7 °F (35.4 °C)   SpO2: 99%     Weight (!) 177 kg (390 lb)  Body mass index is 61.08 kg/m².    Physical Exam   Constitutional: She appears well-developed and well-nourished. She is cooperative.   HENT:   Mouth/Throat: Oropharynx is clear and moist and mucous membranes are normal.   Eyes: Conjunctivae are normal. No scleral icterus.   Cardiovascular: Normal rate.   Pulmonary/Chest: Effort normal.   Abdominal: Soft. There is no tenderness.   Musculoskeletal: Normal range of motion. She exhibits no edema.   Neurological: She is alert.   Skin: Skin is warm and dry. No rash noted.   Psychiatric: She has a normal mood and affect. Judgment normal.         Assessment:  5 years s/p LSG w reduction/repair paraesophageal HH / liver bx (mild steatosis) GDW 9/10/14      ICD-10-CM ICD-9-CM   1. Dyspepsia R10.13 536.8   2. Fatigue, unspecified type R53.83 780.79   3. Vitamin D deficiency E55.9 268.9   4. Vitamin B12 deficiency E53.8 266.2   5. Diabetes mellitus type 2 in obese (CMS/[facility]) E11.69 250.00    E66.9 278.00   6. Morbid obesity with BMI of 60.0-69.9, adult (CMS/[facility]) E66.01 278.01    Z68.44 V85.44       Plan:  Continue w/ good food choices and healthy habits.  Again encouraged to increase daily caloric intake.  Referred to dietitian today.  Resume routine exercise as able.  Repeat labs ordered.  Continue current vitamin regimen w/ adjustments pending lab results.  Call w/ issues/concerns.      The patient was instructed to follow up in 6 months.     Total time spent w/ patient 25 minutes and 15 minutes spent counseling the patient on nutrition and necessary dietary/lifestyle modifications.

## 2019-10-11 NOTE — PROGRESS NOTES
Chief Complaint  OAB (yearly) and Recurrent UTI      HPI  Caty Garcia is a 59 y.o.female who returns today for an annual checkup for follow-up of urinary incontinence and overactive bladder.  She also had a history of UTI but these were based primarily on voided urine samples and she has chronic vaginal discharge from atrophic vaginitis.  She is had far fewer infections diagnosed with fewer symptoms on topical Estrace vaginal cream.  Her overactive bladder is improved on caffeine restriction Myrbetriq and occasional Vesicare which all work well together.  She is requesting refill on the latter but gets her former through her primary care provider.  Her urinary incontinence is improved to the point that she is not wearing a pad.    Vitals:    10/11/19 0856   Pulse: 74   Resp: 18   Temp: 98.2 °F (36.8 °C)   SpO2: 95%       Past Medical History  Past Medical History:   Diagnosis Date   • Anxiety and depression    • Arthritis     BOTHERSOM IN RIGHT SHOULDER   • Bipolar 1 disorder (CMS/[facility])    • Blind     left eye   • Cellulitis     RIGHT FOOT    • Chronic kidney disease     sees dr narvaez    • Colon polyp 2014   • Diabetes mellitus (CMS/[facility])    • Disease of thyroid gland    • Heart murmur    • High blood pressure    • High cholesterol    • History of nuclear stress test     REPORTS APX. 6 YEARS AGO AND THAT ALL WAS WNL'S   • Hypothyroid    • Migraine     REPORTS NONE FOR 10+ YEARS   • MRSA (methicillin resistant Staphylococcus aureus) 05/2017    REPORTS OF LEFT ANKLE AND THAT SHE WAS TREATED   • Nocturia    • Sleep apnea 2011    USES BIPAP HS, TO BRING IN DOS   • Wears glasses        Past Surgical History  Past Surgical History:   Procedure Laterality Date   • CHOLECYSTECTOMY  2007   • COLONOSCOPY  2014   • COLONOSCOPY     • COLONOSCOPY N/A 2/20/2018    Procedure: COLONOSCOPY with hot  snare polypectomies , biopsies, and submucosal injection katy ink;  Surgeon: Shravan Tolliver MD;  Location: [facility] ENDOSCOPY;   "Service:    • COLONOSCOPY N/A 10/1/2018    Procedure: COLONOSCOPY COLD BIOPSY POLYPECTOMY, COLD BIOPSY, COLD AND HOT SNARE POLYPECTOMY, TATTOO INJECTION;  Surgeon: Shravan Tolliver MD;  Location: [facility] ENDOSCOPY;  Service: Gastroenterology   • D&C HYSTEROSCOPY N/A 3/22/2018    Procedure: DILATATION AND CURETTAGE HYSTEROSCOPY with removal large cervical polyp;  Surgeon: Talita Roman MD;  Location: [facility] OR;  Service: Obstetrics/Gynecology   • GASTRIC SLEEVE LAPAROSCOPIC  2014   • HIP ARTHROPLASTY Left 2015   • INTERVENTIONAL RADIOLOGY PROCEDURE N/A 5/26/2017    Procedure: Abdominal Aortagram with Runoff;  Surgeon: Otf Mehta MD;  Location: [facility] CATH INVASIVE LOCATION;  Service:    • JOINT REPLACEMENT Right 2008    HIP REPLACEMENT   • JOINT REPLACEMENT Left 2015    HIP REPLACEMENT   • SKIN BIOPSY Right     SHOULDER   • TOTAL HIP ARTHROPLASTY Right 2008   • UPPER GASTROINTESTINAL ENDOSCOPY     • WISDOM TOOTH EXTRACTION         Medications  has a current medication list which includes the following prescription(s): aripiprazole, bupropion sr, calcitriol, carvedilol, cholestyramine light, diclofenac, diltiazem cd, estradiol, fluticasone, furosemide, glipizide, hydroxyzine, levothyroxine, lithium, multiple vitamin, myrbetriq, one touch ultra test, potassium chloride, simvastatin, solifenacin, vitamin d, and sulfamethoxazole-trimethoprim.    Allergies  Allergies   Allergen Reactions   • Allegra [Fexofenadine] Other (See Comments)     MIGRAINES   • Fluoxetine Hcl Other (See Comments)     MIGRAINES   • Nortriptyline Other (See Comments)     MIGRAINES   • Prozac [Fluoxetine] Other (See Comments)     MIGRAINES     • Trazodone Other (See Comments)     MIGRAINES   • Trazodone And Nefazodone Other (See Comments)     MIGRAINES     • Adhesive Tape Other (See Comments)     \"TEARS MY SKIN\"   • Lamotrigine Rash       Social History  Social History     Socioeconomic History   • Marital status: Single     Spouse name: " Not on file   • Number of children: Not on file   • Years of education: Not on file   • Highest education level: Not on file   Occupational History     Employer: UNEMPLOYED   Tobacco Use   • Smoking status: Never Smoker   • Smokeless tobacco: Never Used   Substance and Sexual Activity   • Alcohol use: No   • Drug use: No   • Sexual activity: Defer       Family History  Family History   Problem Relation Age of Onset   • Hypertension Father    • Hyperlipidemia Father    • Diabetes Father    • Heart attack Father    • Liver cancer Father    • Diabetes Brother    • Colon cancer Maternal Grandfather    • Breast cancer Mother    • Cirrhosis Neg Hx    • Liver disease Neg Hx    • Crohn's disease Neg Hx    • Ulcerative colitis Neg Hx        Review of Systems  Review of Systems   Genitourinary: Positive for frequency, urgency and urinary incontinence.       Physical Exam  Physical Exam   Constitutional: She is oriented to person, place, and time. She appears well-developed and well-nourished.   HENT:   Head: Normocephalic.   Eyes: EOM are normal. Pupils are equal, round, and reactive to light.   Neck: Normal range of motion. Neck supple.   Cardiovascular: Normal rate and regular rhythm.   Pulmonary/Chest: Effort normal.   Abdominal: Soft.   Neurological: She is alert and oriented to person, place, and time.   Skin: Skin is warm and dry.   Psychiatric: She has a normal mood and affect.       Labs recent and today in the office:  Results for orders placed or performed in visit on 10/11/19   POC Urinalysis Dipstick, Automated   Result Value Ref Range    Color Yellow Yellow, Straw, Dark Yellow, Demi    Clarity, UA Clear Clear    Specific Gravity  1.030 1.005 - 1.030    pH, Urine 5.5 5.0 - 8.0    Leukocytes Negative Negative    Nitrite, UA Negative Negative    Protein, POC Trace (A) Negative mg/dL    Glucose, UA Negative Negative, 1000 mg/dL (3+) mg/dL    Ketones, UA Negative Negative    Urobilinogen, UA Normal Normal     Bilirubin Negative Negative    Blood, UA Negative Negative         Assessment & Plan  Urinary incontinence/overactive bladder: Doing very well and caffeine restriction to Myrbetriq and Vesicare.    Atrophic vaginitis: She is requesting refill on Premarin vaginal cream

## (undated) DEVICE — INFLATION DEVICE: Brand: ENCORE™ 26

## (undated) DEVICE — SNAR POLYP SENSATION STDOVL 27 240 BX40

## (undated) DEVICE — PAD SANI MAXI W/ADHS SNG WRP 11IN

## (undated) DEVICE — JELLY,LUBE,STERILE,FLIP TOP,TUBE,2-OZ: Brand: MEDLINE

## (undated) DEVICE — ST IRR CYSTO W/SPK 77IN LF

## (undated) DEVICE — PAD GRND REM POLYHESIVE A/ DISP

## (undated) DEVICE — NDL BLNT 18G 1 1/2IN

## (undated) DEVICE — KT ORCA VLV SXN AIR/H2O W/SEAL 1P/U STRL

## (undated) DEVICE — ANGIO-SEAL VIP VASCULAR CLOSURE DEVICE: Brand: ANGIO-SEAL

## (undated) DEVICE — INDIA INK

## (undated) DEVICE — DESTINATION RENAL GUIDING SHEATH: Brand: DESTINATION

## (undated) DEVICE — ENDOGATOR AUXILIARY WATER JET CONNECTOR: Brand: ENDOGATOR

## (undated) DEVICE — RICH MINOR LITHOTOMY: Brand: MEDLINE INDUSTRIES, INC.

## (undated) DEVICE — RADIFOCUS GLIDEWIRE ADVANTAGE GUIDEWIRE: Brand: GLIDEWIRE ADVANTAGE

## (undated) DEVICE — SYR LL TP 10ML STRL

## (undated) DEVICE — NDL SCLEROTHERAPY INTERJECT 25G 4 240CM

## (undated) DEVICE — Device

## (undated) DEVICE — SUT GUT CHRM 2/0 SH 27IN G123H

## (undated) DEVICE — ANGIOGRAPHIC CATHETER: Brand: EXPO™

## (undated) DEVICE — CATH F6 ST JR 4 100CM: Brand: SUPERTORQUE

## (undated) DEVICE — TRAP,MUCUS SPECIMEN,40CC: Brand: MEDLINE

## (undated) DEVICE — GLV SURG BIOGEL M LTX PF 6 1/2

## (undated) DEVICE — FRCP BIOP COLD ENDOJAW ALLGTR W/NDL 2.8X2300MM BLU

## (undated) DEVICE — NDL ART WING 18G 7CM